# Patient Record
Sex: MALE | Race: BLACK OR AFRICAN AMERICAN | NOT HISPANIC OR LATINO | Employment: PART TIME | URBAN - METROPOLITAN AREA
[De-identification: names, ages, dates, MRNs, and addresses within clinical notes are randomized per-mention and may not be internally consistent; named-entity substitution may affect disease eponyms.]

---

## 2017-03-23 ENCOUNTER — HOSPITAL ENCOUNTER (EMERGENCY)
Facility: HOSPITAL | Age: 57
Discharge: HOME | End: 2017-03-23
Attending: EMERGENCY MEDICINE
Payer: MEDICAID

## 2017-03-23 VITALS
HEIGHT: 74 IN | WEIGHT: 157 LBS | DIASTOLIC BLOOD PRESSURE: 82 MMHG | SYSTOLIC BLOOD PRESSURE: 140 MMHG | HEART RATE: 78 BPM | BODY MASS INDEX: 20.15 KG/M2 | OXYGEN SATURATION: 98 % | TEMPERATURE: 99 F | RESPIRATION RATE: 19 BRPM

## 2017-03-23 DIAGNOSIS — R58 ARTERIAL HEMORRHAGE: Primary | ICD-10-CM
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
## 2017-03-23 DIAGNOSIS — F10.920 ALCOHOL INTOXICATION, UNCOMPLICATED: ICD-10-CM

## 2017-03-23 DIAGNOSIS — S00.03XA SCALP HEMATOMA, INITIAL ENCOUNTER: ICD-10-CM

## 2017-03-23 DIAGNOSIS — S01.81XA FOREHEAD LACERATION, INITIAL ENCOUNTER: ICD-10-CM

## 2017-03-23 LAB
ABO + RH BLD: NORMAL
ANION GAP SERPL CALC-SCNC: 14 MMOL/L
BASOPHILS # BLD AUTO: 0.01 K/UL
BASOPHILS NFR BLD: 0.2 %
BLD GP AB SCN CELLS X3 SERPL QL: NORMAL
BUN SERPL-MCNC: 14 MG/DL
CALCIUM SERPL-MCNC: 9.2 MG/DL
CHLORIDE SERPL-SCNC: 105 MMOL/L
CO2 SERPL-SCNC: 19 MMOL/L
CREAT SERPL-MCNC: 1 MG/DL
DIFFERENTIAL METHOD: ABNORMAL
EOSINOPHIL # BLD AUTO: 0.2 K/UL
EOSINOPHIL NFR BLD: 4.5 %
ERYTHROCYTE [DISTWIDTH] IN BLOOD BY AUTOMATED COUNT: 12.2 %
EST. GFR  (AFRICAN AMERICAN): >60 ML/MIN/1.73 M^2
EST. GFR  (NON AFRICAN AMERICAN): >60 ML/MIN/1.73 M^2
ETHANOL SERPL-MCNC: 243 MG/DL
GLUCOSE SERPL-MCNC: 137 MG/DL
HCT VFR BLD AUTO: 41.1 %
HGB BLD-MCNC: 14.4 G/DL
INR PPP: 1
LYMPHOCYTES # BLD AUTO: 1.4 K/UL
LYMPHOCYTES NFR BLD: 26.3 %
MCH RBC QN AUTO: 31.6 PG
MCHC RBC AUTO-ENTMCNC: 35 %
MCV RBC AUTO: 90 FL
MONOCYTES # BLD AUTO: 0.3 K/UL
MONOCYTES NFR BLD: 4.9 %
NEUTROPHILS # BLD AUTO: 3.3 K/UL
NEUTROPHILS NFR BLD: 63.5 %
PLATELET # BLD AUTO: 233 K/UL
PMV BLD AUTO: 10.5 FL
POTASSIUM SERPL-SCNC: 3.8 MMOL/L
PROTHROMBIN TIME: 10.9 SEC
RBC # BLD AUTO: 4.56 M/UL
SODIUM SERPL-SCNC: 138 MMOL/L
WBC # BLD AUTO: 5.13 K/UL

## 2017-03-23 PROCEDURE — 85025 COMPLETE CBC W/AUTO DIFF WBC: CPT

## 2017-03-23 PROCEDURE — 80048 BASIC METABOLIC PNL TOTAL CA: CPT

## 2017-03-23 PROCEDURE — 85610 PROTHROMBIN TIME: CPT

## 2017-03-23 PROCEDURE — 12011 RPR F/E/E/N/L/M 2.5 CM/<: CPT

## 2017-03-23 PROCEDURE — 99284 EMERGENCY DEPT VISIT MOD MDM: CPT | Mod: 25

## 2017-03-23 PROCEDURE — 86900 BLOOD TYPING SEROLOGIC ABO: CPT

## 2017-03-23 PROCEDURE — 25000003 PHARM REV CODE 250: Performed by: EMERGENCY MEDICINE

## 2017-03-23 PROCEDURE — 86901 BLOOD TYPING SEROLOGIC RH(D): CPT

## 2017-03-23 PROCEDURE — 80320 DRUG SCREEN QUANTALCOHOLS: CPT

## 2017-03-23 RX ADMIN — SODIUM CHLORIDE 1000 ML: 0.9 INJECTION, SOLUTION INTRAVENOUS at 01:03

## 2017-03-23 NOTE — ED PROVIDER NOTES
Encounter Date: 3/23/2017       History     Chief Complaint   Patient presents with    Head Laceration     pt. stumbled and fell hitting his head against corner of a brick wall. no loc. he walks into ed holding pressure to head with towel. the pt. is covered in blood and blood is spewing  from his head.      Review of patient's allergies indicates:  No Known Allergies  Patient is a 56 y.o. male presenting with the following complaint: facial injury. The history is provided by the patient.   Facial Injury    The current episode started just prior to arrival. The incident occurred at home. The injury mechanism was a fall. No protective equipment was used. He came to the ER via walk-in. There is an injury to the head.     Past Medical History:   Diagnosis Date    Hypertension      Past Surgical History:   Procedure Laterality Date    HAND SURGERY      MANDIBLE FRACTURE SURGERY       No family history on file.  Social History   Substance Use Topics    Smoking status: Current Every Day Smoker     Packs/day: 1.00     Types: Cigarettes    Smokeless tobacco: None    Alcohol use 3.6 oz/week     6 Cans of beer per week     Review of Systems    Physical Exam   Initial Vitals   BP Pulse Resp Temp SpO2   03/23/17 0101 03/23/17 0101 03/23/17 0101 03/23/17 0101 03/23/17 0101   149/89 81 19 98.9 °F (37.2 °C) 99 %     Physical Exam    ED Course   Critical Care  Date/Time: 3/23/2017 1:02 AM  Performed by: SEA OSULLIVAN  Authorized by: SEA OSULLIVAN   Direct patient critical care time: 15 minutes  Additional history critical care time: 8 minutes  Ordering / reviewing critical care time: 5 minutes  Documentation critical care time: 5 minutes  Consulting other physicians critical care time: 5 minutes  Total critical care time (exclusive of procedural time) : 38 minutes  Critical care was necessary to treat or prevent imminent or life-threatening deterioration of the following conditions: trauma.  Critical care was time  spent personally by me on the following activities: re-evaluation of patient's condition, examination of patient, ordering and review of laboratory studies, obtaining history from patient or surrogate, evaluation of patient's response to treatment and review of old charts.    Lac Repair  Date/Time: 3/23/2017 1:03 AM  Performed by: SEA OSULLIVAN  Authorized by: SEA OSULLIVAN   Consent Done: Emergent Situation  Body area: head/neck  Location details: forehead  Laceration length: 1 cm  Tendon involvement: none  Nerve involvement: none  Vascular damage: yes  Skin closure: 5-0 Prolene  Number of sutures: 3  Technique: vertical mattress  Approximation: close  Approximation difficulty: complex  Dressing: pressure dressing        Labs Reviewed   CBC W/ AUTO DIFFERENTIAL - Abnormal; Notable for the following:        Result Value    RBC 4.56 (*)     MCH 31.6 (*)     All other components within normal limits   BASIC METABOLIC PANEL - Abnormal; Notable for the following:     CO2 19 (*)     Glucose 137 (*)     All other components within normal limits   ALCOHOL,MEDICAL (ETHANOL) - Abnormal; Notable for the following:     Alcohol, Medical, Serum 243 (*)     All other components within normal limits   PROTIME-INR   TYPE & SCREEN             Medical Decision Making:   Initial Assessment:   56-year-old male who is a walk-in with active pulsating blood coming from his forehead after a fall  Differential Diagnosis:   He has an obvious arterial injury to his superficial scalp.  With compression an overlay hemostatic suturing bleeding stopped.  ED Management:  Hemostatic suturing stopped bleeding.  Patient was given IV fluid bolus in the emergency department.  His vital signs remained stable.  He requested to leave, after his blood work and everything came back normal. HCT without skull fracture or bleed. Patient was given recommendations to return to the emergency department for suture removal in 5 days.                   ED  Course     Clinical Impression:   The primary encounter diagnosis was Arterial hemorrhage. Diagnoses of Forehead laceration, initial encounter, Fall, initial encounter, Scalp hematoma, initial encounter, and Alcohol intoxication, uncomplicated were also pertinent to this visit.          Katya Foote MD  03/23/17 0227       Katya Foote MD  03/23/17 0239

## 2017-03-23 NOTE — ED NOTES
Pt arrived POV and walked in ED covered in blood with . Arterial bleed noted to left forehead. Pressure applied immediately. Pt roomed. MD at BS. Multiple figure eight sutures applied per . Pressure dressing applied using sterile 4x4s and ace bandage. Pt undressed, cleaned, and given blue scrubs. VS obtained and 2 PIVs initiated.

## 2017-03-23 NOTE — ED AVS SNAPSHOT
[facility]  [address] [facility] [address]               Anirudh ROWLEY Frankie   3/23/2017 12:46 AM   ED    Description:  Male : 1960   Department:  [facility]           Your Care was Coordinated By:     Provider Role From To    Katya Foote MD Attending Provider 17 0056 --      Reason for Visit     Head Laceration           Diagnoses this Visit        Comments    Arterial hemorrhage    -  Primary     Forehead laceration, initial encounter         Fall, initial encounter         Scalp hematoma, initial encounter         Alcohol intoxication, uncomplicated           ED Disposition     ED Disposition Condition Comment    Discharge             To Do List           Follow-up Information     Follow up with [facility]. Schedule an appointment as soon as possible for a visit in 2 days.    Specialty:  Family Medicine    Contact information:    [address], [address]  [facility] [address] [phone]      [facility]     [facility] Care [facility] -  Assistance  Registered nurses in the [facility] provide clinical advisement, health education, appointment booking, and other advisory services.  Call for this free service at [phone].             Medications           Message regarding Medications     Verify the changes and/or additions to your medication regime listed below are the same as discussed with your clinician today.  If any of these changes or additions are incorrect, please notify your healthcare provider.        These medications were administered today        Dose Freq    sodium chloride 0.9% bolus 1,000 mL 1,000 mL ED 1 Time    Sig: Inject 1,000 mLs into the vein ED 1 Time.    Class: Normal    Route: Intravenous           Verify that the below list of medications is an accurate representation of the medications you are currently taking.  If none reported, the list may  "be blank. If incorrect, please contact your healthcare provider. Carry this list with you in case of emergency.           Current Medications     LISINOPRIL ORAL Take by mouth.    triamcinolone acetonide 0.1% (KENALOG) 0.1 % Lotn Apply topically 2 (two) times daily.           Clinical Reference Information           Your Vitals Were     BP Pulse Temp Resp Height Weight    135/75 78 98.9 °F (37.2 °C) (Oral) 19 6' 2" (1.88 m) 71.2 kg (157 lb)    SpO2 BMI             98% 20.16 kg/m2         Allergies as of 3/23/2017     No Known Allergies      Immunizations Administered on Date of Encounter - 3/23/2017     None      ED Micro, Lab, POCT     Start Ordered       Status Ordering Provider    03/23/17 0107 03/23/17 0106  Ethanol  Once      Final result     03/23/17 0102 03/23/17 0101  Protime-INR  STAT      Final result     03/23/17 0101 03/23/17 0100  CBC auto differential  STAT      Final result     03/23/17 0101 03/23/17 0100  Basic metabolic panel  STAT      Final result       ED Imaging Orders     Start Ordered       Status Ordering Provider    03/23/17 0101 03/23/17 0101  CT Head Without Contrast  1 time imaging      Final result         Discharge Instructions         You had a fall today and sustained a laceration to your left forehead.  You injured one of the arteries of your forehead.  You had to have sutures placed to stop the bleeding.  Please have the sutures removed in 5 days.  Please return to the emergency department for this.  Please avoid drinking excessively to try and avoid any further falls.    First Aid: Head Injuries  A strong blow to the head may cause swelling and bleeding inside the skull. The resulting pressure can injure the brain (concussion). If you have any doubts identifying a concussion, have a healthcare provider check the victim.  Seek medical help if any of the following is true:  · The victim loses consciousness.  · The victim has convulsions or seizures.  · The victim has unequal pupil " size (the black part in the center of th eye is bigger one one side than the other)  · The victim shows any of the following signs of concussion:  ¨ confusion or inability to follow normal conversation  ¨ slurred speech  ¨ dizziness or vision problems  ¨ nausea or vomiting  ¨ muscle weakness or loss of mobility  ¨ memory loss  ¨ sensitivity to noise  ¨ fatigue  Call 911 immediately if the victim has any of the following:  · Prolonged loss of consciousness  · A depressed or spongy area in the skull, or visible bone fragments  · Clear fluid draining out of  the ears or nose  While you wait for help:  1. Reassure the person.  2. Treat for shock by maintaining body temperature and keeping the victim calm.  3. Provide rescue breathing or CPR, if needed.  4. If the person has neck or back pain or is unconscious, he or she might have a spine fracture. They should only  be moved with great precaution and if it is absolutely necessary.   Step 1: Control bleeding  · Apply direct pressure to control bleeding. (Wear gloves or use other protection to avoid contact with victim's blood.)  · Wash a minor surface injury with soap and water after the bleeding stops or is reduced.  · Cover the wound with a clean dressing and bandage.  Step 2: Ice bumps and bruises  · Place a cold pack or ice on the injury to reduce swelling and pain. Placing a cloth between the injury and the ice pack helps prevent tissue damage from severe cold.  Step 3: Observe the victim  · Watch for vomiting or changes in mood or alertness. If you notice changes, call for medical help. Signs of concussion may not appear for up to 48 hours.  · Tell the person's partner, parent, or roommate about the injury so he or she can continue to observe the victim.  Stitches  If a cut is deep or continues to bleed, or the edges of skin do not stay together evenly, the wound may need to be closed with stitches, tape, staples, or medical glue. All can help speed healing and  reduce the risk of infection and the size of the scar. These may be especially important concerns with large wounds, and wounds on the head or other visible body parts.  If you think a wound may need medical care, visit a health care professional as soon as possible. If stitches are needed, they must be applied in the first few hours. A wound that is not properly closed is at risk of serious infection.  Date Last Reviewed: 10/19/2015  © 1557-3516 [employer]. [address] [place] [address]. All rights reserved. This information is not intended as a substitute for professional medical care. Always follow your healthcare professional's instructions.          Discharge References/Attachments     LACERATION, FACE: SUTURE OR TAPE (ENGLISH)      [facility] Sign-Up     Activating your [facility] account is as easy as 1-2-3!     1) Visit [email or web address], select Sign Up Now, enter this activation code and your date of birth, then select Next.  [email or web address]  Expires: 5/7/2017  2:39 AM      2) Create a username and password to use when you visit [facility] in the future and select a security question in case you lose your password and select Next.    3) Enter your e-mail address and click Sign Up!    Additional Information  If you have questions, please e-mail [email or web address] or call [phone] to talk to our [facility] staff. Remember, [facility] is NOT to be used for urgent needs. For medical emergencies, dial 911.          [facility] complies with applicable Federal civil rights laws and does not discriminate on the basis of race, color, national origin, age, disability, or sex.        Language Assistance Services     ATTENTION: Language assistance services are available, free of charge. Please call [phone].      ATENCIÓN: Si habla benita, tiene a oneal disposición servicios gratuitos de asistencia lingüística. Llame al [phone].     CHÚ Ý: N?u b?n nói Ti?ng  Vi?t, có các d?ch v? h? tr? ngôn ng? mi?n phí dành cho b?n. G?i s? [phone].

## 2017-03-23 NOTE — DISCHARGE INSTRUCTIONS
You had a fall today and sustained a laceration to your left forehead.  You injured one of the arteries of your forehead.  You had to have sutures placed to stop the bleeding.  Please have the sutures removed in 5 days.  Please return to the emergency department for this.  Please avoid drinking excessively to try and avoid any further falls.    First Aid: Head Injuries  A strong blow to the head may cause swelling and bleeding inside the skull. The resulting pressure can injure the brain (concussion). If you have any doubts identifying a concussion, have a healthcare provider check the victim.  Seek medical help if any of the following is true:  · The victim loses consciousness.  · The victim has convulsions or seizures.  · The victim has unequal pupil size (the black part in the center of th eye is bigger one one side than the other)  · The victim shows any of the following signs of concussion:  ¨ confusion or inability to follow normal conversation  ¨ slurred speech  ¨ dizziness or vision problems  ¨ nausea or vomiting  ¨ muscle weakness or loss of mobility  ¨ memory loss  ¨ sensitivity to noise  ¨ fatigue  Call 911 immediately if the victim has any of the following:  · Prolonged loss of consciousness  · A depressed or spongy area in the skull, or visible bone fragments  · Clear fluid draining out of  the ears or nose  While you wait for help:  1. Reassure the person.  2. Treat for shock by maintaining body temperature and keeping the victim calm.  3. Provide rescue breathing or CPR, if needed.  4. If the person has neck or back pain or is unconscious, he or she might have a spine fracture. They should only  be moved with great precaution and if it is absolutely necessary.   Step 1: Control bleeding  · Apply direct pressure to control bleeding. (Wear gloves or use other protection to avoid contact with victim's blood.)  · Wash a minor surface injury with soap and water after the bleeding stops or is  reduced.  · Cover the wound with a clean dressing and bandage.  Step 2: Ice bumps and bruises  · Place a cold pack or ice on the injury to reduce swelling and pain. Placing a cloth between the injury and the ice pack helps prevent tissue damage from severe cold.  Step 3: Observe the victim  · Watch for vomiting or changes in mood or alertness. If you notice changes, call for medical help. Signs of concussion may not appear for up to 48 hours.  · Tell the person's partner, parent, or roommate about the injury so he or she can continue to observe the victim.  Stitches  If a cut is deep or continues to bleed, or the edges of skin do not stay together evenly, the wound may need to be closed with stitches, tape, staples, or medical glue. All can help speed healing and reduce the risk of infection and the size of the scar. These may be especially important concerns with large wounds, and wounds on the head or other visible body parts.  If you think a wound may need medical care, visit a health care professional as soon as possible. If stitches are needed, they must be applied in the first few hours. A wound that is not properly closed is at risk of serious infection.  Date Last Reviewed: 10/19/2015  © 1194-6763 [employer]. [address] [place] [address], [place] [address]. All rights reserved. This information is not intended as a substitute for professional medical care. Always follow your healthcare professional's instructions.

## 2017-04-11 ENCOUNTER — HOSPITAL ENCOUNTER (EMERGENCY)
Facility: HOSPITAL | Age: 57
Discharge: HOME | End: 2017-04-11
Attending: EMERGENCY MEDICINE
Payer: MEDICAID

## 2017-04-11 VITALS
WEIGHT: 170 LBS | HEIGHT: 74 IN | DIASTOLIC BLOOD PRESSURE: 87 MMHG | TEMPERATURE: 98 F | RESPIRATION RATE: 20 BRPM | HEART RATE: 79 BPM | BODY MASS INDEX: 21.82 KG/M2 | OXYGEN SATURATION: 97 % | SYSTOLIC BLOOD PRESSURE: 166 MMHG

## 2017-04-11 DIAGNOSIS — L24.0 CONTACT DERMATITIS DUE TO DETERGENT, UNSPECIFIED CONTACT DERMATITIS TYPE: Primary | ICD-10-CM

## 2017-04-11 PROCEDURE — 96372 THER/PROPH/DIAG INJ SC/IM: CPT

## 2017-04-11 PROCEDURE — 99283 EMERGENCY DEPT VISIT LOW MDM: CPT | Mod: 25

## 2017-04-11 PROCEDURE — 63600175 PHARM REV CODE 636 W HCPCS: Performed by: NURSE PRACTITIONER

## 2017-04-11 RX ORDER — TRIAMCINOLONE ACETONIDE 1 MG/G
OINTMENT TOPICAL 2 TIMES DAILY
Qty: 15 G | Refills: 0 | Status: SHIPPED | OUTPATIENT
Start: 2017-04-11 | End: 2017-05-02

## 2017-04-11 RX ORDER — PREDNISONE 20 MG/1
40 TABLET ORAL DAILY
Qty: 6 TABLET | Refills: 0 | Status: SHIPPED | OUTPATIENT
Start: 2017-04-11 | End: 2017-04-14

## 2017-04-11 RX ORDER — METHYLPREDNISOLONE SOD SUCC 125 MG
125 VIAL (EA) INJECTION
Status: COMPLETED | OUTPATIENT
Start: 2017-04-11 | End: 2017-04-11

## 2017-04-11 RX ADMIN — METHYLPREDNISOLONE SODIUM SUCCINATE 125 MG: 125 INJECTION, POWDER, FOR SOLUTION INTRAMUSCULAR; INTRAVENOUS at 11:04

## 2017-04-11 NOTE — ED PROVIDER NOTES
"Encounter Date: 4/11/2017       History     Chief Complaint   Patient presents with    Rash     Pt report having an itchy rash on his hands for about 3 months. States that he has had this problem for years.      Review of patient's allergies indicates:  No Known Allergies  HPI Comments: 57yo male with PMHx of HTN is here for a rash to his forearms and hands which he has had for the past three months.  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  Pt states, "I know what this is.  It is from the chemicals.  They got new stuff.  I get this all the time.  I just need a steroid shot, some pills, and ointment.  I like the ointment better because I can rub it in better."  PT denies fever/chills, pain, vomiting.      Patient is a 56 y.o. male presenting with the following complaint: rash. The history is provided by the patient.   Rash    The current episode started several weeks ago. The problem has been gradually worsening. The problem is associated with chemical exposure and a new detergent/soap. The rash is present on the right hand, left hand, right arm and left arm. The pain is at a severity of 0/10. The pain has been worsening since onset. Associated symptoms include itching. Pertinent negatives include no blisters, no pain and no weeping. He has tried nothing for the symptoms. The treatment provided no relief. Risk factors include new environmental exposures.     Past Medical History:   Diagnosis Date    Hypertension      Past Surgical History:   Procedure Laterality Date    HAND SURGERY      MANDIBLE FRACTURE SURGERY       History reviewed. No pertinent family history.  Social History   Substance Use Topics    Smoking status: Current Every Day Smoker     Packs/day: 1.00     Types: Cigarettes    Smokeless tobacco: None    Alcohol use 3.6 oz/week     6 Cans of beer per week     Review of Systems   Constitutional: Negative for chills and " fever.   HENT: Negative for facial swelling and trouble swallowing.    Respiratory: Negative for cough, chest tightness and shortness of breath.    Cardiovascular: Negative for chest pain.   Gastrointestinal: Negative for nausea and vomiting.   Musculoskeletal: Negative for back pain, joint swelling, myalgias and neck pain.   Skin: Positive for itching and rash.   Allergic/Immunologic: Negative for immunocompromised state.   Neurological: Negative for weakness.   Psychiatric/Behavioral: Negative for confusion.   All other systems reviewed and are negative.      Physical Exam   Initial Vitals   BP Pulse Resp Temp SpO2   04/11/17 1040 04/11/17 1040 04/11/17 1040 04/11/17 1040 04/11/17 1040   161/84 87 18 97.9 °F (36.6 °C) 97 %     Physical Exam    Nursing note and vitals reviewed.  Constitutional: Vital signs are normal. He appears well-developed and well-nourished. He is active and cooperative. He is easily aroused.  Non-toxic appearance. He does not have a sickly appearance. He does not appear ill. No distress.   HENT:   Head: Normocephalic and atraumatic.   Mouth/Throat: Uvula is midline and oropharynx is clear and moist.   Eyes: Conjunctivae are normal.   Neck: Normal range of motion.   Cardiovascular: Normal rate.   Pulmonary/Chest: Effort normal.   Abdominal: Normal appearance. There is no tenderness.   Neurological: He is alert, oriented to person, place, and time and easily aroused. GCS eye subscore is 4. GCS verbal subscore is 5. GCS motor subscore is 6.   Skin: Skin is warm, dry and intact. Rash noted. No lesion, no petechiae and no purpura noted. Rash is papular. Rash is not macular, not maculopapular, not nodular, not pustular, not vesicular and not urticarial.   Rash to bilateral forearms and hands.  No TTP.  No erythema, warmth, or drainage.  Consistent with contact dermatitis.  No breakdown of skin.    Psychiatric: He has a normal mood and affect. His speech is normal and behavior is normal. Judgment  and thought content normal. Cognition and memory are normal.         ED Course   Procedures  Labs Reviewed - No data to display          Medical Decision Making:   Initial Assessment:   55yo male here for recurrent rash to hands and forearms the pt attributes to gloves and chemicals at work.  Pt appears well, nontoxic.  Vitals stable.  No airway involvement.  Rash to bilateral hands and forearms consistent with contact dermatitis.   Differential Diagnosis:   Contact dermatitis, cellulitis, allergic reaction  ED Management:  IM Solumedrol  Rash due to contact derm.  Advised protection at work from chemicals and requesting new gloves at work without latex.  RX Prednisone and kenalog ointment.  Advised return to the ED if condition changes, progresses, or if there are concerns.  F/u with PCP and dermatology within 1 week.  Pt verbalized understanding and compliance.               Attending Attestation:     Physician Attestation Statement for NP/PA:   I discussed this assessment and plan of this patient with the NP/PA, but I did not personally examine the patient. The face to face encounter was performed by the NP/PA.                  ED Course     Clinical Impression:   The encounter diagnosis was Contact dermatitis due to detergent, unspecified contact dermatitis type.          AMY Bedoya  04/11/17 1743       Sin Plascencia MD  04/11/17 174

## 2017-04-11 NOTE — ED AVS SNAPSHOT
[facility]  [address] [facility] [address]               [place]   2017 10:50 AM   ED    Description:  Male : 1960   Department:  [facility]           Your Care was Coordinated By:     Provider Role From To    Sin Plascencia MD Attending Provider 17 1100 --    AMY Bedoay Nurse Practitioner 17 1053 --      Reason for Visit     Rash           Diagnoses this Visit        Comments    Contact dermatitis and eczema due to detergents    -  Primary       ED Disposition     None           To Do List           Follow-up Information     Follow up with [facility]. Schedule an appointment as soon as possible for a visit in 3 days.    Specialty:  Family Medicine    Contact information:    [address], [address]  [facility] [address] [phone]        Follow up with Dermatology. Schedule an appointment as soon as possible for a visit in 1 week.       These Medications        Disp Refills Start End    triamcinolone acetonide 0.1% (KENALOG) 0.1 % ointment 15 g 0 2017    Apply topically 2 (two) times daily. Until resolved - Topical (Top)    predniSONE (DELTASONE) 20 MG tablet 6 tablet 0 2017    Take 2 tablets (40 mg total) by mouth once daily. Start 17 - Oral      [facility]     [facility] Care [facility] -  Assistance  Unless otherwise directed by your provider, please contact [facility], our nurse care line that is available for  assistance.     Registered nurses in the [facility] provide: appointment scheduling, clinical advisement, health education, and other advisory services.  Call: [phone] (toll free)               Medications           Message regarding Medications     Verify the changes and/or additions to your medication regime listed below are the same as discussed with your  "clinician today.  If any of these changes or additions are incorrect, please notify your healthcare provider.        START taking these NEW medications        Refills    triamcinolone acetonide 0.1% (KENALOG) 0.1 % ointment 0    Sig: Apply topically 2 (two) times daily. Until resolved    Class: Print    Route: Topical (Top)    predniSONE (DELTASONE) 20 MG tablet 0    Sig: Take 2 tablets (40 mg total) by mouth once daily. Start 4/12/17    Class: Print    Route: Oral      These medications were administered today        Dose Freq    methylPREDNISolone sodium succinate injection 125 mg 125 mg ED 1 Time    Sig: Inject 125 mg into the muscle ED 1 Time.    Class: Normal    Route: Intramuscular      STOP taking these medications     triamcinolone acetonide 0.1% (KENALOG) 0.1 % Lotn Apply topically 2 (two) times daily.           Verify that the below list of medications is an accurate representation of the medications you are currently taking.  If none reported, the list may be blank. If incorrect, please contact your healthcare provider. Carry this list with you in case of emergency.           Current Medications     LISINOPRIL ORAL Take by mouth.    methylPREDNISolone sodium succinate injection 125 mg Inject 125 mg into the muscle ED 1 Time.    predniSONE (DELTASONE) 20 MG tablet Take 2 tablets (40 mg total) by mouth once daily. Start 4/12/17    triamcinolone acetonide 0.1% (KENALOG) 0.1 % ointment Apply topically 2 (two) times daily. Until resolved           Clinical Reference Information           Your Vitals Were     BP Pulse Temp Resp Height Weight    161/84 87 97.9 °F (36.6 °C) 18 6' 2" (1.88 m) 77.1 kg (170 lb)    SpO2 BMI             97% 21.83 kg/m2         Allergies as of 4/11/2017     No Known Allergies      Immunizations Administered on Date of Encounter - 4/11/2017     None      ED Micro, Lab, POCT     None      ED Imaging Orders     None        Discharge Instructions         Contact Dermatitis  Contact " "dermatitis is a skin rash caused by something that touches the skin and makes it irritated and inflamed. Your skin may be red, swollen, dry, and may be cracked. Blisters may form and ooze. The rash will itch.  Contact dermatitis can form on the face and neck, backs of hands, forearms, genitals, and lower legs.  People can get contact dermatitis from lots of sources. These include:  · Plants such as poison ivy, oak, or sumac  · Chemicals in hair dyes and rinses, soaps, solvents, waxes, fingernail polish, and deodorants   · Jewelry or watchbands made of nickel  Contact dermatitis is not passed from person to person.  Talk with your healthcare provider about what may have caused the rash. A type of allergy testing called "patch testing" may be used to discover what you are allergic to. You will need to avoid the source of your rash in the future to prevent it from coming back.  Treatment is done to relieve itching and prevent the rash from coming back. The rash should go away in a few days to a few weeks.  Home care  Your healthcare provider may prescribe medicine to relieve swelling and itching. Follow all instructions when using these medicines.  General care:  · Avoid anything that heats up your skin, such as hot showers or baths, or direct sunlight. This can make itching worse.  · Apply cold compresses to soothe your sores to help relieve your symptoms. Do this for 30 minutes 3 to 4 times a day. You can make a cold compress by soaking a cloth in cold water. Squeeze out excess water. You can add colloidal oatmeal to the water to help reduce itching. For severe itching in a small area, apply an ice pack wrapped in a thin towel. Do this for 20 minutes 3 to 4 times a day.  · You can also try wet dressings. One way to do this is to wear a wet piece of clothing under a dry one. Wear a damp shirt under a dry shirt if your upper body is affected. This can relieve itching and prevent you from scratching the affected " area.  · You can also help relieve large areas of itching by taking a lukewarm bath with colloidal oatmeal added to the water.  · Use hydrocortisone cream for redness and irritation, unless another medicine was prescribed. You can also use benzocaine anesthetic cream or spray. Calamine lotion can also relieve mild symptoms.  · Use oral diphenhydramine to help reduce itching. You can buy this antihistamine at drug and grocery stores. It can make you sleepy, so use lower doses during the daytime. Or you can use loratadine. This is an antihistamine that will not make you sleepy. Do not use diphenhydramine if you have glaucoma or have trouble urinating due to an enlarged prostate.  · If a plant causes your rash, make sure to wash your skin and the clothes you were wearing when you came into contact with the plant. This is to wash away the plant oils that gave you the rash and prevent more or worse symptoms.  · Stay away from the substance or object that causes your symptoms. If you cant avoid it, wear gloves or some other type of protection.  Follow-up care  Follow up with your healthcare provider, or as advised.  When to seek medical advice  Call your healthcare provider right away if any of these occur:  · Spreading of the rash to other parts of your body  · Severe swelling of your face, eyelids, mouth, throat or tongue  · Trouble urinating due to swelling in the genital area  · Fever of 100.4°F (38°C) or higher  · Redness or swelling that gets worse  · Pain that gets worse  · Foul-smelling fluid leaking from the skin  · Yellow-brown crusts on the open blisters  Date Last Reviewed: 9/1/2016  © 2249-5475 [employer]. [address] [place] [address], [place] [address]. All rights reserved. This information is not intended as a substitute for professional medical care. Always follow your healthcare professional's instructions.          Self-Care for Skin Rashes     Pat your skin dry. Do not rub.     When your skin  reacts to a substance your body is sensitive to, it can cause a rash. You can treat most rashes at home by keeping the skin clean and dry. Many rashes may get better on their own within 2 to 3 days. You may need medical attention if your rash itches, drains, or hurts, particularly if the rash is getting worse.  What can cause a skin rash?  · Sun poisoning, caused by too much exposure to the sun  · An irritant or allergic reaction to a certain type of food, plant, or chemical, such as  shellfish, poison ivy, and or cleaning products  · An infection caused by a fungus (ringworm), virus (chickenpox), or bacteria (strep)  · Bites or infestation caused by insects or pests, such as ticks, lice, or mites  · Dry skin, which is often seen during the winter months and in older people  How can I control itching and skin damage?  · Take soothing lukewarm baths in a colloidal oatmeal product. You can buy this at the [employer].  · Do your best not to scratch. Clip fingernails short, especially in young children, to reduce skin damage if scratching does occur.  · Use moisturizing skin lotion instead of scratching your dry skin.  · Use sunscreen whenever going out into direct sun.  · Use only mild cleansing agents whenever possible.  · Wash with mild, nonirritating soap and warm water.  · Wear clothing that breathes, such as cotton shirts or canvas shoes.  · If fluid is seeping from the rash, cover it loosely with clean gauze to absorb the discharge.  · Many rashes are contagious. Prevent the rash from spreading to others by washing your hands often before or after touching others with any skin rash.  Use medicine  · Antihistamines such as diphenhydramine can help control itching. But use with caution because they can make you drowsy.  · Using over-the-counter hydrocortisone cream on small rashes may help reduce swelling and itching  · Most over-the-counter antifungal medicines can treat athletes foot and many other fungal  infections of the skin.  Check with your healthcare provider  Call your healthcare provider if:  · You were told that you have a fungal infection on your skin to make sure you have the correct type of medicine.  · You have questions or concerns about medicines or their side effects.     Call 911  Call 911 if either of these occur:  · Your tongue or lips start to swell  · You have difficulty breathing      Call your healthcare provider  Call your healthcare provider if any of these occur:  · Temperature of more than 101.0°F (38.3°C), or as directed  · Sore throat, a cough, or unusual fatigue  · Red, oozy, or painful rash gets worse. These are signs of infection.  · Rash covers your face, genitals, or most of your body  · Crusty sores or red rings that begin to spread  · You were exposed to someone who has a contagious rash, such as scabies or lice.  · Red bulls-eye rash with a white center (a sign of Lyme disease)  · You were told that you have resistant bacteria (MRSA) on your skin.   Date Last Reviewed: 5/12/2015  © 6735-3355 [employer]. [address] [place] [address]. All rights reserved. This information is not intended as a substitute for professional medical care. Always follow your healthcare professional's instructions.          [facility] Sign-Up     Activating your [facility] account is as easy as 1-2-3!     1) Visit [email or web address], select Sign Up Now, enter this activation code and your date of birth, then select Next.  [email or web address]  Expires: 5/7/2017  2:39 AM      2) Create a username and password to use when you visit [facility] in the future and select a security question in case you lose your password and select Next.    3) Enter your e-mail address and click Sign Up!    Additional Information  If you have questions, please e-mail [email or web address] or call [phone] to talk to our [facility] staff. Remember, [facility] is NOT to be used for urgent needs. For medical  emergencies, dial 911.         Smoking Cessation     If you would like to quit smoking:   You may be eligible for free services if you are a [place] resident and started smoking cigarettes before September 1, 1988.  Call the Smoking Cessation Trust (SCT) toll free at (438) 643-0350 or (588) 691-0425.   Call 1-800-QUIT-NOW if you do not meet the above criteria.   Contact us via email: [email or web address]   View our website for more information: [email or web address]         [facility] complies with applicable Federal civil rights laws and does not discriminate on the basis of race, color, national origin, age, disability, or sex.        Language Assistance Services     ATTENTION: Language assistance services are available, free of charge. Please call [phone].      ATENCIÓN: Si habla benita, tiene a oneal disposición servicios gratuitos de asistencia lingüística. Llame al [phone].     CHÚ Ý: N?u b?n nói Ti?ng Vi?t, có các d?ch v? h? tr? ngôn ng? mi?n phí dành cho b?n. G?i s? [phone].

## 2017-04-11 NOTE — ED NOTES
Pt states he has a rash to bilateral hands for months with swelling that began this morning.  Pt states that he has a history of eczema and this happens often.

## 2017-04-11 NOTE — DISCHARGE INSTRUCTIONS
"  Contact Dermatitis  Contact dermatitis is a skin rash caused by something that touches the skin and makes it irritated and inflamed. Your skin may be red, swollen, dry, and may be cracked. Blisters may form and ooze. The rash will itch.  Contact dermatitis can form on the face and neck, backs of hands, forearms, genitals, and lower legs.  People can get contact dermatitis from lots of sources. These include:  · Plants such as poison ivy, oak, or sumac  · Chemicals in hair dyes and rinses, soaps, solvents, waxes, fingernail polish, and deodorants   · Jewelry or watchbands made of nickel  Contact dermatitis is not passed from person to person.  Talk with your healthcare provider about what may have caused the rash. A type of allergy testing called "patch testing" may be used to discover what you are allergic to. You will need to avoid the source of your rash in the future to prevent it from coming back.  Treatment is done to relieve itching and prevent the rash from coming back. The rash should go away in a few days to a few weeks.  Home care  Your healthcare provider may prescribe medicine to relieve swelling and itching. Follow all instructions when using these medicines.  General care:  · Avoid anything that heats up your skin, such as hot showers or baths, or direct sunlight. This can make itching worse.  · Apply cold compresses to soothe your sores to help relieve your symptoms. Do this for 30 minutes 3 to 4 times a day. You can make a cold compress by soaking a cloth in cold water. Squeeze out excess water. You can add colloidal oatmeal to the water to help reduce itching. For severe itching in a small area, apply an ice pack wrapped in a thin towel. Do this for 20 minutes 3 to 4 times a day.  · You can also try wet dressings. One way to do this is to wear a wet piece of clothing under a dry one. Wear a damp shirt under a dry shirt if your upper body is affected. This can relieve itching and prevent you from " scratching the affected area.  · You can also help relieve large areas of itching by taking a lukewarm bath with colloidal oatmeal added to the water.  · Use hydrocortisone cream for redness and irritation, unless another medicine was prescribed. You can also use benzocaine anesthetic cream or spray. Calamine lotion can also relieve mild symptoms.  · Use oral diphenhydramine to help reduce itching. You can buy this antihistamine at drug and grocery stores. It can make you sleepy, so use lower doses during the daytime. Or you can use loratadine. This is an antihistamine that will not make you sleepy. Do not use diphenhydramine if you have glaucoma or have trouble urinating due to an enlarged prostate.  · If a plant causes your rash, make sure to wash your skin and the clothes you were wearing when you came into contact with the plant. This is to wash away the plant oils that gave you the rash and prevent more or worse symptoms.  · Stay away from the substance or object that causes your symptoms. If you cant avoid it, wear gloves or some other type of protection.  Follow-up care  Follow up with your healthcare provider, or as advised.  When to seek medical advice  Call your healthcare provider right away if any of these occur:  · Spreading of the rash to other parts of your body  · Severe swelling of your face, eyelids, mouth, throat or tongue  · Trouble urinating due to swelling in the genital area  · Fever of 100.4°F (38°C) or higher  · Redness or swelling that gets worse  · Pain that gets worse  · Foul-smelling fluid leaking from the skin  · Yellow-brown crusts on the open blisters  Date Last Reviewed: 9/1/2016  © 6270-8454 [employer]. [address] [place] [address], [place] [address]. All rights reserved. This information is not intended as a substitute for professional medical care. Always follow your healthcare professional's instructions.          Self-Care for Skin Rashes     Pat your skin dry. Do not  rub.     When your skin reacts to a substance your body is sensitive to, it can cause a rash. You can treat most rashes at home by keeping the skin clean and dry. Many rashes may get better on their own within 2 to 3 days. You may need medical attention if your rash itches, drains, or hurts, particularly if the rash is getting worse.  What can cause a skin rash?  · Sun poisoning, caused by too much exposure to the sun  · An irritant or allergic reaction to a certain type of food, plant, or chemical, such as  shellfish, poison ivy, and or cleaning products  · An infection caused by a fungus (ringworm), virus (chickenpox), or bacteria (strep)  · Bites or infestation caused by insects or pests, such as ticks, lice, or mites  · Dry skin, which is often seen during the winter months and in older people  How can I control itching and skin damage?  · Take soothing lukewarm baths in a colloidal oatmeal product. You can buy this at the [employer].  · Do your best not to scratch. Clip fingernails short, especially in young children, to reduce skin damage if scratching does occur.  · Use moisturizing skin lotion instead of scratching your dry skin.  · Use sunscreen whenever going out into direct sun.  · Use only mild cleansing agents whenever possible.  · Wash with mild, nonirritating soap and warm water.  · Wear clothing that breathes, such as cotton shirts or canvas shoes.  · If fluid is seeping from the rash, cover it loosely with clean gauze to absorb the discharge.  · Many rashes are contagious. Prevent the rash from spreading to others by washing your hands often before or after touching others with any skin rash.  Use medicine  · Antihistamines such as diphenhydramine can help control itching. But use with caution because they can make you drowsy.  · Using over-the-counter hydrocortisone cream on small rashes may help reduce swelling and itching  · Most over-the-counter antifungal medicines can treat athletes foot and  many other fungal infections of the skin.  Check with your healthcare provider  Call your healthcare provider if:  · You were told that you have a fungal infection on your skin to make sure you have the correct type of medicine.  · You have questions or concerns about medicines or their side effects.     Call 911  Call 911 if either of these occur:  · Your tongue or lips start to swell  · You have difficulty breathing      Call your healthcare provider  Call your healthcare provider if any of these occur:  · Temperature of more than 101.0°F (38.3°C), or as directed  · Sore throat, a cough, or unusual fatigue  · Red, oozy, or painful rash gets worse. These are signs of infection.  · Rash covers your face, genitals, or most of your body  · Crusty sores or red rings that begin to spread  · You were exposed to someone who has a contagious rash, such as scabies or lice.  · Red bulls-eye rash with a white center (a sign of Lyme disease)  · You were told that you have resistant bacteria (MRSA) on your skin.   Date Last Reviewed: 5/12/2015  © 2573-5316 [employer]. [address] [place] [address]. All rights reserved. This information is not intended as a substitute for professional medical care. Always follow your healthcare professional's instructions.

## 2017-05-02 ENCOUNTER — HOSPITAL ENCOUNTER (EMERGENCY)
Facility: HOSPITAL | Age: 57
Discharge: HOME | End: 2017-05-02
Attending: EMERGENCY MEDICINE
Payer: MEDICAID

## 2017-05-02 VITALS
SYSTOLIC BLOOD PRESSURE: 142 MMHG | HEART RATE: 85 BPM | RESPIRATION RATE: 20 BRPM | WEIGHT: 160 LBS | DIASTOLIC BLOOD PRESSURE: 87 MMHG | BODY MASS INDEX: 20.53 KG/M2 | TEMPERATURE: 98 F | OXYGEN SATURATION: 98 % | HEIGHT: 74 IN

## 2017-05-02 DIAGNOSIS — L30.1 DYSHIDROTIC ECZEMA: Primary | ICD-10-CM

## 2017-05-02 PROCEDURE — 63600175 PHARM REV CODE 636 W HCPCS: Performed by: EMERGENCY MEDICINE

## 2017-05-02 PROCEDURE — 96372 THER/PROPH/DIAG INJ SC/IM: CPT

## 2017-05-02 PROCEDURE — 99283 EMERGENCY DEPT VISIT LOW MDM: CPT

## 2017-05-02 RX ORDER — DEXAMETHASONE SODIUM PHOSPHATE 4 MG/ML
12 INJECTION, SOLUTION INTRA-ARTICULAR; INTRALESIONAL; INTRAMUSCULAR; INTRAVENOUS; SOFT TISSUE
Status: COMPLETED | OUTPATIENT
Start: 2017-05-02 | End: 2017-05-02

## 2017-05-02 RX ORDER — PREDNISONE 10 MG/1
10 TABLET ORAL DAILY
Qty: 21 TABLET | Refills: 0 | Status: SHIPPED | OUTPATIENT
Start: 2017-05-02 | End: 2017-05-12

## 2017-05-02 RX ORDER — AMOXICILLIN AND CLAVULANATE POTASSIUM 875; 125 MG/1; MG/1
1 TABLET, FILM COATED ORAL 2 TIMES DAILY
Qty: 14 TABLET | Refills: 0 | Status: SHIPPED | OUTPATIENT
Start: 2017-05-02 | End: 2017-06-18

## 2017-05-02 RX ORDER — CLOBETASOL PROPIONATE 0.5 MG/G
OINTMENT TOPICAL 2 TIMES DAILY
Qty: 60 G | Refills: 0 | Status: ON HOLD | OUTPATIENT
Start: 2017-05-02 | End: 2020-03-12 | Stop reason: HOSPADM

## 2017-05-02 RX ORDER — KETOROLAC TROMETHAMINE 30 MG/ML
60 INJECTION, SOLUTION INTRAMUSCULAR; INTRAVENOUS
Status: COMPLETED | OUTPATIENT
Start: 2017-05-02 | End: 2017-05-02

## 2017-05-02 RX ORDER — DIPHENHYDRAMINE HYDROCHLORIDE 50 MG/ML
50 INJECTION INTRAMUSCULAR; INTRAVENOUS
Status: COMPLETED | OUTPATIENT
Start: 2017-05-02 | End: 2017-05-02

## 2017-05-02 RX ORDER — HYDROXYZINE HYDROCHLORIDE 25 MG/1
25 TABLET, FILM COATED ORAL 3 TIMES DAILY PRN
Qty: 30 TABLET | Refills: 0 | Status: SHIPPED | OUTPATIENT
Start: 2017-05-02 | End: 2017-07-23

## 2017-05-02 RX ADMIN — KETOROLAC TROMETHAMINE 60 MG: 30 INJECTION, SOLUTION INTRAMUSCULAR at 07:05

## 2017-05-02 RX ADMIN — DIPHENHYDRAMINE HYDROCHLORIDE 50 MG: 50 INJECTION, SOLUTION INTRAMUSCULAR; INTRAVENOUS at 07:05

## 2017-05-02 RX ADMIN — DEXAMETHASONE SODIUM PHOSPHATE 12 MG: 4 INJECTION, SOLUTION INTRAMUSCULAR; INTRAVENOUS at 07:05

## 2017-05-02 NOTE — ED AVS SNAPSHOT
[facility]  [address]  [place] [address]               Anirudh David   2017  6:53 PM   ED    Description:  Male : 1960   Department:  [facility]           Your Care was Coordinated By:     Provider Role From To    Nereida Iverson MD Attending Provider [id number] --      Reason for Visit     Rash           Diagnoses this Visit        Comments    Dyshidrotic eczema    -  Primary       ED Disposition     ED Disposition Condition Comment    Discharge             To Do List           Follow-up Information     Follow up with [facility] In 3 days.    Contact information:    [address]  [facility] [address]  [phone]          Follow up with Andrew Brock MD In 3 days.    Specialty:  Dermatology    Contact information:    [address]  [place] [address]  [phone]         These Medications        Disp Refills Start End    predniSONE (DELTASONE) 10 MG tablet 21 tablet 0 2017    Take 1 tablet (10 mg total) by mouth once daily. Take 4 tabs x 3 days, then  Take 2 tabs x 3 days, then   Take 1 tab x 3 days. - Oral    amoxicillin-clavulanate 875-125mg (AUGMENTIN) 875-125 mg per tablet 14 tablet 0 2017     Take 1 tablet by mouth 2 (two) times daily. - Oral    clobetasol 0.05% (TEMOVATE) 0.05 % Oint 60 g 0 2017    Apply topically 2 (two) times daily. - Topical    hydrOXYzine HCl (ATARAX) 25 MG tablet 30 tablet 0 2017     Take 1 tablet (25 mg total) by mouth 3 (three) times daily as needed for Itching. - Oral      [facility]     [facility] -  Assistance  Unless otherwise directed by your provider, please contact [facility], our nurse care line that is available for  assistance.     Registered nurses in the [facility] provide: appointment scheduling, clinical advisement, health education, and other advisory services.  Call:  [phone] (toll free)               Medications           Message regarding Medications     Verify the changes and/or additions to your medication regime listed below are the same as discussed with your clinician today.  If any of these changes or additions are incorrect, please notify your healthcare provider.        START taking these NEW medications        Refills    predniSONE (DELTASONE) 10 MG tablet 0    Sig: Take 1 tablet (10 mg total) by mouth once daily. Take 4 tabs x 3 days, then  Take 2 tabs x 3 days, then   Take 1 tab x 3 days.    Class: Print    Route: Oral    amoxicillin-clavulanate 875-125mg (AUGMENTIN) 875-125 mg per tablet 0    Sig: Take 1 tablet by mouth 2 (two) times daily.    Class: Print    Route: Oral    clobetasol 0.05% (TEMOVATE) 0.05 % Oint 0    Sig: Apply topically 2 (two) times daily.    Class: Print    Route: Topical    hydrOXYzine HCl (ATARAX) 25 MG tablet 0    Sig: Take 1 tablet (25 mg total) by mouth 3 (three) times daily as needed for Itching.    Class: Print    Route: Oral      These medications were administered today        Dose Freq    ketorolac injection 60 mg 60 mg ED 1 Time    Sig: Inject 60 mg into the muscle ED 1 Time.    Class: Normal    Route: Intramuscular    dexamethasone injection 12 mg 12 mg ED 1 Time    Sig: Inject 3 mLs (12 mg total) into the muscle ED 1 Time.    Class: Normal    Route: Intramuscular    diphenhydrAMINE injection 50 mg 50 mg ED 1 Time    Sig: Inject 1 mL (50 mg total) into the muscle ED 1 Time.    Class: Normal    Route: Intramuscular      STOP taking these medications     triamcinolone acetonide 0.1% (KENALOG) 0.1 % ointment Apply topically 2 (two) times daily. Until resolved           Verify that the below list of medications is an accurate representation of the medications you are currently taking.  If none reported, the list may be blank. If incorrect, please contact your healthcare provider. Carry this list with you in case of emergency.     "       Current Medications     amoxicillin-clavulanate 875-125mg (AUGMENTIN) 875-125 mg per tablet Take 1 tablet by mouth 2 (two) times daily.    clobetasol 0.05% (TEMOVATE) 0.05 % Oint Apply topically 2 (two) times daily.    hydrOXYzine HCl (ATARAX) 25 MG tablet Take 1 tablet (25 mg total) by mouth 3 (three) times daily as needed for Itching.    LISINOPRIL ORAL Take by mouth.    predniSONE (DELTASONE) 10 MG tablet Take 1 tablet (10 mg total) by mouth once daily. Take 4 tabs x 3 days, then  Take 2 tabs x 3 days, then   Take 1 tab x 3 days.           Clinical Reference Information           Your Vitals Were     BP Pulse Temp Resp Height Weight    189/98 (BP Location: Right arm, Patient Position: Sitting) 84 97.9 °F (36.6 °C) (Oral) 20 6' 2" (1.88 m) 72.6 kg (160 lb)    SpO2 BMI             98% 20.54 kg/m2         Allergies as of 5/2/2017     No Known Allergies      Immunizations Administered on Date of Encounter - 5/2/2017     None      ED Micro, Lab, POCT     None      ED Imaging Orders     None        Discharge Instructions         Self-Care for Skin Rashes     Pat your skin dry. Do not rub.     When your skin reacts to a substance your body is sensitive to, it can cause a rash. You can treat most rashes at home by keeping the skin clean and dry. Many rashes may get better on their own within 2 to 3 days. You may need medical attention if your rash itches, drains, or hurts, particularly if the rash is getting worse.  What can cause a skin rash?  · Sun poisoning, caused by too much exposure to the sun  · An irritant or allergic reaction to a certain type of food, plant, or chemical, such as  shellfish, poison ivy, and or cleaning products  · An infection caused by a fungus (ringworm), virus (chickenpox), or bacteria (strep)  · Bites or infestation caused by insects or pests, such as ticks, lice, or mites  · Dry skin, which is often seen during the winter months and in older people  How can I control itching and skin " damage?  · Take soothing lukewarm baths in a colloidal oatmeal product. You can buy this at the [employer].  · Do your best not to scratch. Clip fingernails short, especially in young children, to reduce skin damage if scratching does occur.  · Use moisturizing skin lotion instead of scratching your dry skin.  · Use sunscreen whenever going out into direct sun.  · Use only mild cleansing agents whenever possible.  · Wash with mild, nonirritating soap and warm water.  · Wear clothing that breathes, such as cotton shirts or canvas shoes.  · If fluid is seeping from the rash, cover it loosely with clean gauze to absorb the discharge.  · Many rashes are contagious. Prevent the rash from spreading to others by washing your hands often before or after touching others with any skin rash.  Use medicine  · Antihistamines such as diphenhydramine can help control itching. But use with caution because they can make you drowsy.  · Using over-the-counter hydrocortisone cream on small rashes may help reduce swelling and itching  · Most over-the-counter antifungal medicines can treat athletes foot and many other fungal infections of the skin.  Check with your healthcare provider  Call your healthcare provider if:  · You were told that you have a fungal infection on your skin to make sure you have the correct type of medicine.  · You have questions or concerns about medicines or their side effects.     Call 911  Call 911 if either of these occur:  · Your tongue or lips start to swell  · You have difficulty breathing      Call your healthcare provider  Call your healthcare provider if any of these occur:  · Temperature of more than 101.0°F (38.3°C), or as directed  · Sore throat, a cough, or unusual fatigue  · Red, oozy, or painful rash gets worse. These are signs of infection.  · Rash covers your face, genitals, or most of your body  · Crusty sores or red rings that begin to spread  · You were exposed to someone who has a contagious  rash, such as scabies or lice.  · Red bulls-eye rash with a white center (a sign of Lyme disease)  · You were told that you have resistant bacteria (MRSA) on your skin.   Date Last Reviewed: 5/12/2015  © 8758-8994 [employer]. [address] [place] [address]. All rights reserved. This information is not intended as a substitute for professional medical care. Always follow your healthcare professional's instructions.          Discharge References/Attachments     ATOPIC DERMATITIS (ECZEMA) (ENGLISH)      [facility] Sign-Up     Activating your [facility] account is as easy as 1-2-3!     1) Visit [email or web address], select Sign Up Now, enter this activation code and your date of birth, then select Next.  [email or web address]  Expires: 5/7/2017  2:39 AM      2) Create a username and password to use when you visit [facility] in the future and select a security question in case you lose your password and select Next.    3) Enter your e-mail address and click Sign Up!    Additional Information  If you have questions, please e-mail [email or web address] or call [phone] to talk to our [facility] staff. Remember, [facility] is NOT to be used for urgent needs. For medical emergencies, dial 911.          [facility] complies with applicable Federal civil rights laws and does not discriminate on the basis of race, color, national origin, age, disability, or sex.        Language Assistance Services     ATTENTION: Language assistance services are available, free of charge. Please call [phone].      ATENCIÓN: Si habla benita, tiene a oneal disposición servicios gratuitos de asistencia lingüística. Llame al [phone].     CHÚ Ý: N?u b?n nói Ti?ng Vi?t, có các d?ch v? h? tr? ngôn ng? mi?n phí dành cho b?n. G?i s? [phone].

## 2017-05-03 NOTE — DISCHARGE INSTRUCTIONS
Self-Care for Skin Rashes     Pat your skin dry. Do not rub.     When your skin reacts to a substance your body is sensitive to, it can cause a rash. You can treat most rashes at home by keeping the skin clean and dry. Many rashes may get better on their own within 2 to 3 days. You may need medical attention if your rash itches, drains, or hurts, particularly if the rash is getting worse.  What can cause a skin rash?  · Sun poisoning, caused by too much exposure to the sun  · An irritant or allergic reaction to a certain type of food, plant, or chemical, such as  shellfish, poison ivy, and or cleaning products  · An infection caused by a fungus (ringworm), virus (chickenpox), or bacteria (strep)  · Bites or infestation caused by insects or pests, such as ticks, lice, or mites  · Dry skin, which is often seen during the winter months and in older people  How can I control itching and skin damage?  · Take soothing lukewarm baths in a colloidal oatmeal product. You can buy this at the [employer].  · Do your best not to scratch. Clip fingernails short, especially in young children, to reduce skin damage if scratching does occur.  · Use moisturizing skin lotion instead of scratching your dry skin.  · Use sunscreen whenever going out into direct sun.  · Use only mild cleansing agents whenever possible.  · Wash with mild, nonirritating soap and warm water.  · Wear clothing that breathes, such as cotton shirts or canvas shoes.  · If fluid is seeping from the rash, cover it loosely with clean gauze to absorb the discharge.  · Many rashes are contagious. Prevent the rash from spreading to others by washing your hands often before or after touching others with any skin rash.  Use medicine  · Antihistamines such as diphenhydramine can help control itching. But use with caution because they can make you drowsy.  · Using over-the-counter hydrocortisone cream on small rashes may help reduce swelling and itching  · Most  over-the-counter antifungal medicines can treat athletes foot and many other fungal infections of the skin.  Check with your healthcare provider  Call your healthcare provider if:  · You were told that you have a fungal infection on your skin to make sure you have the correct type of medicine.  · You have questions or concerns about medicines or their side effects.     Call 911  Call 911 if either of these occur:  · Your tongue or lips start to swell  · You have difficulty breathing      Call your healthcare provider  Call your healthcare provider if any of these occur:  · Temperature of more than 101.0°F (38.3°C), or as directed  · Sore throat, a cough, or unusual fatigue  · Red, oozy, or painful rash gets worse. These are signs of infection.  · Rash covers your face, genitals, or most of your body  · Crusty sores or red rings that begin to spread  · You were exposed to someone who has a contagious rash, such as scabies or lice.  · Red bulls-eye rash with a white center (a sign of Lyme disease)  · You were told that you have resistant bacteria (MRSA) on your skin.   Date Last Reviewed: 5/12/2015  © 1625-2189 [employer]. [address] [place] [address], [place] [address]. All rights reserved. This information is not intended as a substitute for professional medical care. Always follow your healthcare professional's instructions.

## 2017-05-03 NOTE — ED NOTES
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  Pt reports itchy, red, raised rash to torso, upper extremities and bilateral thighs that began after working last pm.  Denies SOB, lip or mouth swelling, difficulty swallowing.  Speech clear.

## 2017-05-16 NOTE — ED PROVIDER NOTES
Encounter Date: 5/2/2017       History     Chief Complaint   Patient presents with    Rash     c/o dry, red, peeling skin to entire body since last night. States he was seen recently for similar problem to hands. Ran out of prescribed cream     Review of patient's allergies indicates:  No Known Allergies  Patient is a 56 y.o. male presenting with the following complaint: rash. The history is provided by the patient.   Rash    This is a recurrent problem. The current episode started two days ago. The problem has been rapidly worsening. Associated with: Dishwashing at work.  Affected Location: Diffuse worse on hands/forearms  The pain is at a severity of 5/10. The pain has been worsening since onset. Associated symptoms include itching, pain and weeping. Pertinent negatives include no blisters. Risk factors: Patient previuosly on a cream which he ran out of. Long history of difficult to control eczema.      Past Medical History:   Diagnosis Date    Dermatitis     Hypertension      Past Surgical History:   Procedure Laterality Date    HAND SURGERY      MANDIBLE FRACTURE SURGERY       History reviewed. No pertinent family history.  Social History   Substance Use Topics    Smoking status: Current Every Day Smoker     Packs/day: 1.00     Types: Cigarettes    Smokeless tobacco: None    Alcohol use 3.6 oz/week     6 Cans of beer per week      Comment: occ     Review of Systems   Constitutional: Negative for chills and fever.   HENT: Negative for sore throat.    Respiratory: Negative for shortness of breath.    Cardiovascular: Negative for chest pain.   Gastrointestinal: Negative for nausea.   Genitourinary: Negative for dysuria.   Musculoskeletal: Negative for back pain.   Skin: Positive for itching and rash.   Neurological: Negative for weakness.   Hematological: Does not bruise/bleed easily.   All other systems reviewed and are negative.      Physical Exam   Initial Vitals   BP Pulse Resp Temp SpO2   05/02/17 1756  05/02/17 1756 05/02/17 1756 05/02/17 1756 05/02/17 1756   189/98 84 20 97.9 °F (36.6 °C) 98 %     Physical Exam    Nursing note and vitals reviewed.  Constitutional: He appears well-developed and well-nourished.   HENT:   Head: Normocephalic and atraumatic.   Eyes: Conjunctivae and EOM are normal. Pupils are equal, round, and reactive to light.   Neck: Normal range of motion. Neck supple.   Cardiovascular: Normal rate, regular rhythm, normal heart sounds and intact distal pulses. Exam reveals no gallop and no friction rub.    No murmur heard.  Pulmonary/Chest: Breath sounds normal. No stridor. No respiratory distress. He has no wheezes. He has no rhonchi. He has no rales. He exhibits no tenderness.   Abdominal: Soft. Bowel sounds are normal. He exhibits no distension. There is no tenderness. There is no rebound and no guarding.   Musculoskeletal: Normal range of motion.   Neurological: He is alert and oriented to person, place, and time. He has normal strength. No cranial nerve deficit.   Skin: Skin is warm and dry. Rash noted.        Psychiatric: He has a normal mood and affect.         ED Course   Procedures  Labs Reviewed - No data to display          Medical Decision Making:   Initial Assessment:   Severe eczema without evidence of systemic infection but possible superinfection of some eczema lesions.  Recommended f/u with derm ASAP, topical and oral steroids, antibiotics                    ED Course     Clinical Impression:   The encounter diagnosis was Dyshidrotic eczema.    Disposition:   Disposition: Discharged  Condition: Stable       Nereida Iverson MD  05/16/17 7649

## 2017-06-18 ENCOUNTER — HOSPITAL ENCOUNTER (EMERGENCY)
Facility: HOSPITAL | Age: 57
Discharge: HOME | End: 2017-06-18
Attending: EMERGENCY MEDICINE
Payer: MEDICAID

## 2017-06-18 VITALS
HEIGHT: 74 IN | SYSTOLIC BLOOD PRESSURE: 169 MMHG | WEIGHT: 160 LBS | DIASTOLIC BLOOD PRESSURE: 104 MMHG | RESPIRATION RATE: 16 BRPM | HEART RATE: 73 BPM | TEMPERATURE: 98 F | BODY MASS INDEX: 20.53 KG/M2 | OXYGEN SATURATION: 98 %

## 2017-06-18 DIAGNOSIS — S81.812A LACERATION OF LEFT LOWER LEG, INITIAL ENCOUNTER: Primary | ICD-10-CM

## 2017-06-18 DIAGNOSIS — I10 ELEVATED BLOOD PRESSURE READING WITH DIAGNOSIS OF HYPERTENSION: ICD-10-CM

## 2017-06-18 PROCEDURE — 99283 EMERGENCY DEPT VISIT LOW MDM: CPT

## 2017-06-18 PROCEDURE — 25000003 PHARM REV CODE 250: Performed by: EMERGENCY MEDICINE

## 2017-06-18 RX ORDER — CEPHALEXIN 500 MG/1
500 CAPSULE ORAL 4 TIMES DAILY
Qty: 20 CAPSULE | Refills: 0 | Status: SHIPPED | OUTPATIENT
Start: 2017-06-18 | End: 2017-06-23

## 2017-06-18 RX ORDER — MUPIROCIN 20 MG/G
1 OINTMENT TOPICAL
Status: COMPLETED | OUTPATIENT
Start: 2017-06-18 | End: 2017-06-18

## 2017-06-18 RX ORDER — CEPHALEXIN 500 MG/1
500 CAPSULE ORAL
Status: DISCONTINUED | OUTPATIENT
Start: 2017-06-18 | End: 2017-06-18

## 2017-06-18 RX ADMIN — MUPIROCIN 22 G: 20 OINTMENT TOPICAL at 08:06

## 2017-06-19 NOTE — ED NOTES
"Patient identifiers for Anirudh David checked and correct.  LOC: The patient is awake, alert and aware of environment with an appropriate affect, the patient is oriented x 3 and speaking appropriately.  APPEARANCE: Patient resting comfortably and in no acute distress, patient is clean and well groomed, patient's clothing are properly fastened.  SKIN: Left "L" shape laceration to left lower extremity.  MUSKULOSKELETAL: Patient moving all extremities well.    "
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
## 2017-07-23 ENCOUNTER — HOSPITAL ENCOUNTER (EMERGENCY)
Facility: HOSPITAL | Age: 57
Discharge: HOME | End: 2017-07-23
Attending: EMERGENCY MEDICINE
Payer: MEDICAID

## 2017-07-23 VITALS
TEMPERATURE: 98 F | DIASTOLIC BLOOD PRESSURE: 78 MMHG | RESPIRATION RATE: 18 BRPM | HEIGHT: 74 IN | WEIGHT: 160 LBS | HEART RATE: 74 BPM | OXYGEN SATURATION: 97 % | BODY MASS INDEX: 20.53 KG/M2 | SYSTOLIC BLOOD PRESSURE: 148 MMHG

## 2017-07-23 DIAGNOSIS — T78.3XXA ANGIOEDEMA OF LIPS, INITIAL ENCOUNTER: Primary | ICD-10-CM

## 2017-07-23 PROCEDURE — 96375 TX/PRO/DX INJ NEW DRUG ADDON: CPT

## 2017-07-23 PROCEDURE — 63600175 PHARM REV CODE 636 W HCPCS: Performed by: EMERGENCY MEDICINE

## 2017-07-23 PROCEDURE — 99284 EMERGENCY DEPT VISIT MOD MDM: CPT | Mod: 25

## 2017-07-23 PROCEDURE — 25000003 PHARM REV CODE 250: Performed by: EMERGENCY MEDICINE

## 2017-07-23 PROCEDURE — 96374 THER/PROPH/DIAG INJ IV PUSH: CPT

## 2017-07-23 RX ORDER — FAMOTIDINE 10 MG/ML
20 INJECTION INTRAVENOUS ONCE
Status: COMPLETED | OUTPATIENT
Start: 2017-07-23 | End: 2017-07-23

## 2017-07-23 RX ORDER — METHYLPREDNISOLONE SOD SUCC 125 MG
125 VIAL (EA) INJECTION
Status: COMPLETED | OUTPATIENT
Start: 2017-07-23 | End: 2017-07-23

## 2017-07-23 RX ORDER — AMLODIPINE BESYLATE 5 MG/1
5 TABLET ORAL DAILY
Qty: 30 TABLET | Refills: 6 | Status: ON HOLD | OUTPATIENT
Start: 2017-07-23 | End: 2020-01-01 | Stop reason: HOSPADM

## 2017-07-23 RX ORDER — DIPHENHYDRAMINE HYDROCHLORIDE 50 MG/ML
25 INJECTION INTRAMUSCULAR; INTRAVENOUS
Status: COMPLETED | OUTPATIENT
Start: 2017-07-23 | End: 2017-07-23

## 2017-07-23 RX ADMIN — DIPHENHYDRAMINE HYDROCHLORIDE 25 MG: 50 INJECTION, SOLUTION INTRAMUSCULAR; INTRAVENOUS at 09:07

## 2017-07-23 RX ADMIN — METHYLPREDNISOLONE SODIUM SUCCINATE 125 MG: 125 INJECTION, POWDER, FOR SOLUTION INTRAMUSCULAR; INTRAVENOUS at 09:07

## 2017-07-23 RX ADMIN — FAMOTIDINE 20 MG: 10 INJECTION INTRAVENOUS at 09:07

## 2017-07-23 NOTE — ED NOTES
Pt awake, alert, states that it feels like the swelling is going down a little. Swelling noted to pt upper lip.

## 2017-07-23 NOTE — ED NOTES
APPEARANCE: Alert, oriented and in no acute distress.  CARDIAC: Normal rate and rhythm, no murmur heard.   PERIPHERAL VASCULAR: peripheral pulses present. Normal cap refill. No edema. Warm to touch.    RESPIRATORY:Normal rate and effort, breath sounds clear bilaterally throughout chest. Respirations are equal and unlabored no obvious signs of distress.  GASTRO: soft, bowel sounds normal, no tenderness, no abdominal distention.  MUSC: Full ROM. No bony tenderness or soft tissue tenderness. No obvious deformity.  SKIN: Skin is warm and dry, normal skin turgor, mucous membranes moist.  NEURO: 5/5 strength major flexors/extensors bilaterally. Sensory intact to light touch bilaterally. Garwood coma scale: eyes open spontaneously-4, oriented & converses-5, obeys commands-6. No neurological abnormalities.   MENTAL STATUS: awake, alert and aware of environment.  EYE: PERRL, both eyes: pupils brisk and reactive to light. Normal size.  ENT: EARS: no obvious drainage. NOSE: no active bleeding. + swollen upper lip.  BREAST: symmetrical. No masses. No tenderness.  GENITALIA: Normal external genitalia.

## 2017-07-23 NOTE — ED PROVIDER NOTES
Encounter Date: 7/23/2017       History     Chief Complaint   Patient presents with    Facial Swelling     Woke up with facial swelling.  No shortness of breath, no trouble breathing.  Patient is currently on Lisinopril.  Last time he took it was yesterday     Patient is a 57-year-old male who awoke this morning with swelling of his upper lip.  He denies difficulty breathing or swallowing.  Patient has a history of hypertension and is currently on lisinopril, with his last dose being taken yesterday.  He has no prior history of this reaction.  No chest pain or shortness of breath.      The history is provided by the patient.     Review of patient's allergies indicates:  No Known Allergies  Past Medical History:   Diagnosis Date    Dermatitis     Hypertension      Past Surgical History:   Procedure Laterality Date    HAND SURGERY      MANDIBLE FRACTURE SURGERY       History reviewed. No pertinent family history.  Social History   Substance Use Topics    Smoking status: Current Every Day Smoker     Packs/day: 1.00     Types: Cigarettes    Smokeless tobacco: Never Used    Alcohol use 3.6 oz/week     6 Cans of beer per week      Comment: occ     Review of Systems   Constitutional: Negative for chills and fever.   HENT: Positive for facial swelling. Negative for trouble swallowing.    Respiratory: Negative for shortness of breath.    Gastrointestinal: Negative for abdominal pain, nausea and vomiting.   Skin: Negative for color change and rash.   Neurological: Negative for light-headedness and headaches.       Physical Exam     Initial Vitals [07/23/17 0827]   BP Pulse Resp Temp SpO2   (!) 151/84 67 16 98.1 °F (36.7 °C) 96 %      MAP       106.33         Physical Exam    Nursing note and vitals reviewed.  Constitutional: No distress.   HENT:   Profuse swelling of the upper lip.  No intraoral or posterior pharyngeal swelling.   Eyes: EOM are normal. Pupils are equal, round, and reactive to light.   Neck: Normal  range of motion. Neck supple.   Cardiovascular: Normal rate, regular rhythm and normal heart sounds.   Pulmonary/Chest: Breath sounds normal.   Abdominal: There is no tenderness.   Musculoskeletal: Normal range of motion. He exhibits no edema.   Neurological: He is alert and oriented to person, place, and time.   Skin: Skin is warm and dry.   Psychiatric: His behavior is normal. Thought content normal.         ED Course   Procedures  Labs Reviewed - No data to display          Medical Decision Making:   ED Management:  57-year-old male with angioedema from lisinopril.  Patient had swelling mainly confined to his upper lip.  He was given Cipro Medrol, Benadryl and Pepcid here in the ED.  After a period of observation, swelling decreased.  He was discharged home in good condition and advised to stop taking lisinopril.  I have written him a prescription for Norvasc.  He will follow-up with his physician as soon as able for recheck and further treatment as warranted.                   ED Course     Clinical Impression:   The encounter diagnosis was Angioedema of lips, initial encounter.                           Sin Plascencia MD  07/23/17 3335

## 2017-08-28 ENCOUNTER — HOSPITAL ENCOUNTER (EMERGENCY)
Facility: HOSPITAL | Age: 57
Discharge: HOME | End: 2017-08-28
Attending: EMERGENCY MEDICINE
Payer: MEDICAID

## 2017-08-28 VITALS
HEIGHT: 74 IN | HEART RATE: 78 BPM | WEIGHT: 162 LBS | DIASTOLIC BLOOD PRESSURE: 70 MMHG | TEMPERATURE: 97 F | SYSTOLIC BLOOD PRESSURE: 138 MMHG | OXYGEN SATURATION: 98 % | BODY MASS INDEX: 20.79 KG/M2 | RESPIRATION RATE: 16 BRPM

## 2017-08-28 DIAGNOSIS — L30.9 DERMATITIS: Primary | ICD-10-CM

## 2017-08-28 DIAGNOSIS — L30.9 ECZEMA, UNSPECIFIED TYPE: ICD-10-CM

## 2017-08-28 PROCEDURE — 25000003 PHARM REV CODE 250: Performed by: NURSE PRACTITIONER

## 2017-08-28 PROCEDURE — 99283 EMERGENCY DEPT VISIT LOW MDM: CPT

## 2017-08-28 RX ORDER — NAPROXEN 500 MG/1
500 TABLET ORAL
Status: COMPLETED | OUTPATIENT
Start: 2017-08-28 | End: 2017-08-28

## 2017-08-28 RX ADMIN — NAPROXEN 500 MG: 500 TABLET ORAL at 02:08

## 2017-08-28 NOTE — ED PROVIDER NOTES
"Encounter Date: 8/28/2017       History     Chief Complaint   Patient presents with    Other     skin to both hands is dry and cracking     58yo male with pmhx of HTN and eczema is here for an exacerbation of his bilateral hand rash.  Pt reports that he has had this rash for over three months, and he is scheduled to see dermatology next month.  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  He reports that over the past several days, the pain and swelling has gotten worse.  He denies trauma, fever/chills, wound, and vomiting.  He reports bilateral hand pain which is worse with palpation and movement.  He has been using some atarax to help with the itching, but he is awaiting his refill which is currently at the pharmacy.  No drainge from his hands, but he does report they're "cracking."  He usually wears gloves (nitrile) to help with pain as he feels that when his hands dry, the pain becomes worse.        The history is provided by the patient.   Rash    This is a recurrent problem. The current episode started several weeks ago. The problem has been gradually worsening. The problem is associated with a new detergent/soap. Affected Location: bilateral hands and forearms. The pain is at a severity of 8/10. The pain has been constant since onset. Associated symptoms include itching and pain. Pertinent negatives include no blisters. He has tried antihistamines and cold cream for the symptoms. The treatment provided no relief.     Review of patient's allergies indicates:  No Known Allergies  Past Medical History:   Diagnosis Date    Dermatitis     Hypertension      Past Surgical History:   Procedure Laterality Date    HAND SURGERY      MANDIBLE FRACTURE SURGERY       History reviewed. No pertinent family history.  Social History   Substance Use Topics    Smoking status: Current Every Day Smoker     Packs/day: 1.00     Types: Cigarettes    Smokeless tobacco: " Never Used    Alcohol use 3.6 oz/week     6 Cans of beer per week      Comment: occ     Review of Systems   Constitutional: Negative for chills and fever.   HENT: Negative for congestion.    Respiratory: Negative for cough.    Cardiovascular: Negative for chest pain.   Gastrointestinal: Negative for abdominal pain and vomiting.   Musculoskeletal: Positive for arthralgias.   Skin: Positive for itching and rash.   Allergic/Immunologic: Negative for immunocompromised state.   Neurological: Negative for weakness and numbness.   Hematological: Does not bruise/bleed easily.   Psychiatric/Behavioral: Negative for confusion.       Physical Exam     Initial Vitals [08/28/17 1301]   BP Pulse Resp Temp SpO2   134/83 92 18 98.2 °F (36.8 °C) 97 %      MAP       100         Physical Exam    Nursing note and vitals reviewed.  Constitutional: Vital signs are normal. He appears well-developed and well-nourished. He is cooperative. He is easily aroused.  Non-toxic appearance. He does not have a sickly appearance. He does not appear ill. No distress.   HENT:   Head: Normocephalic and atraumatic.   Eyes: Conjunctivae are normal.   Neck: Normal range of motion.   Cardiovascular: Normal rate.   Pulmonary/Chest: Effort normal.   Abdominal: Soft. Normal appearance and bowel sounds are normal. There is no tenderness.   Musculoskeletal:        Right hand: He exhibits swelling. He exhibits normal range of motion, no tenderness, no bony tenderness, normal two-point discrimination, normal capillary refill, no deformity and no laceration. Normal sensation noted.        Left hand: He exhibits swelling. He exhibits normal range of motion, no tenderness, no bony tenderness, normal two-point discrimination, normal capillary refill, no deformity and no laceration. Normal sensation noted. Normal strength noted.   Neurological: He is alert, oriented to person, place, and time and easily aroused. He has normal strength. No sensory deficit. GCS eye  subscore is 4. GCS verbal subscore is 5. GCS motor subscore is 6.   Skin: Skin is warm, dry and intact. No rash noted. There is erythema.   Thickened erythematous skin of bilateral hands and forearms.  Several small cracks in folds. NO abscess.    Psychiatric: He has a normal mood and affect. His speech is normal and behavior is normal. Judgment and thought content normal. Cognition and memory are normal.         ED Course   Procedures  Labs Reviewed - No data to display          Medical Decision Making:   Initial Assessment:   57-year-old male with a past medical history of hypertension and eczema is here for recurrent and worsening rashes bilateral hands and forearms.  He has been diagnosed with eczema in the past, but reports the rash is worse than usual.  No trauma, fever/chills, or wound.  He reports that he is going to see a dermatologist next month.  The patient appears well, nontoxic.  He has erythematous and thickened skin to bilateral hands and forearms which is consistent with eczema.  No visualized abscess.  Differential Diagnosis:   Eczema, contact dermatitis, cellulitis  ED Management:  Exam  Rash consistent with eczema. No signs of infection.  Pt advised to f/u with dermatology at first available appointment.  There is no indication for labs or imaging at this time.  No indication for abx.  Advised to use lotion TID and return to the ED if condition changes, progresses, or if there are concerns.  Pt verbalized understanding, compliance, and agreement with the treatment plan.                Attending Attestation:     Physician Attestation Statement for NP/PA:   I have conducted a face to face encounter with this patient in addition to the NP/PA, due to NP/PA Request    Other NP/PA Attestation Additions:    History of Present Illness: 57M with hx of eczema presents with worsening bilateral hand drying and cracking.    Medical Decision Making: Hands are dry, flaky, cracking - no signs of infection.   Treat with dimethicone cream - follow up with derm.  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                 ED Course     Clinical Impression:   The primary encounter diagnosis was Dermatitis. A diagnosis of Eczema, unspecified type was also pertinent to this visit.                           Niurka Alonzo, AMY  08/28/17 1429       Diane Mejia MD  08/31/17 4892

## 2017-08-28 NOTE — ED NOTES
Bilateral hand pain/swelling and severely flaking skin with hx of eczema.  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

## 2017-08-28 NOTE — DISCHARGE INSTRUCTIONS
Use the lotion at least three times a day.  Follow up with your dermatologist at first available appointment.

## 2018-05-16 ENCOUNTER — HOSPITAL ENCOUNTER (EMERGENCY)
Facility: HOSPITAL | Age: 58
Discharge: HOME | End: 2018-05-16
Attending: EMERGENCY MEDICINE
Payer: MEDICAID

## 2018-05-16 VITALS
WEIGHT: 155 LBS | RESPIRATION RATE: 18 BRPM | TEMPERATURE: 99 F | BODY MASS INDEX: 19.89 KG/M2 | HEART RATE: 76 BPM | OXYGEN SATURATION: 99 % | SYSTOLIC BLOOD PRESSURE: 144 MMHG | DIASTOLIC BLOOD PRESSURE: 82 MMHG | HEIGHT: 74 IN

## 2018-05-16 DIAGNOSIS — F10.920 ALCOHOLIC INTOXICATION WITHOUT COMPLICATION: ICD-10-CM

## 2018-05-16 DIAGNOSIS — S41.111A LACERATION OF RIGHT UPPER EXTREMITY, INITIAL ENCOUNTER: Primary | ICD-10-CM

## 2018-05-16 PROCEDURE — 12002 RPR S/N/AX/GEN/TRNK2.6-7.5CM: CPT

## 2018-05-16 PROCEDURE — 99284 EMERGENCY DEPT VISIT MOD MDM: CPT | Mod: 25
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
## 2018-05-16 NOTE — ED PROVIDER NOTES
Encounter Date: 5/16/2018    SCRIBE #1 NOTE: I, Foreign Meehan, am scribing for, and in the presence of, Dr. Hunter Craig.       History     Chief Complaint   Patient presents with    Fall     Tripped and fell after drinking 2 beers.  Has multiple abrasions to left forearm and elbows and possible right hand.  No LOC.  Patient was picked up at [facility].     Anirudh David is a 57 y.o. male who  has a past medical history of Dermatitis and Hypertension.    The patient presents to the ED via EMS secondary to a fall just prior to arrival. EMS states that they picked the patient up from a [facility] after he tripped and fell. EMS reports that the patient has multiple superficial abrasions to his left forearm and elbows. EMS reports that the patient denies any loss of consciousness. EMS reports that the patient has been drinking alcohol today. A full HPI is unavailable at this time due to the patient's intoxication making him a poor historian.         The history is provided by the EMS personnel. The history is limited by the condition of the patient. No  was used.     Review of patient's allergies indicates:  No Known Allergies  Past Medical History:   Diagnosis Date    Dermatitis     Hypertension      Past Surgical History:   Procedure Laterality Date    HAND SURGERY      MANDIBLE FRACTURE SURGERY       No family history on file.  Social History   Substance Use Topics    Smoking status: Current Every Day Smoker     Packs/day: 1.00     Types: Cigarettes    Smokeless tobacco: Never Used    Alcohol use 3.6 oz/week     6 Cans of beer per week      Comment: occ     Review of Systems   Unable to perform ROS: Other       Physical Exam     Initial Vitals [05/16/18 1514]   BP Pulse Resp Temp SpO2   (!) 144/82 76 18 98.6 °F (37 °C) 99 %      MAP       102.67         Physical Exam    Nursing note and vitals reviewed.  Constitutional: He appears well-developed and well-nourished. He is not  diaphoretic. No distress.   Upon arrival patient appears intoxicated.    HENT:   Head: Normocephalic and atraumatic.   Mouth/Throat: Oropharynx is clear and moist.   Eyes: Conjunctivae and EOM are normal. Pupils are equal, round, and reactive to light. No scleral icterus.   Neck: Normal range of motion. Neck supple.   Cardiovascular: Normal rate, regular rhythm and normal heart sounds. Exam reveals no gallop and no friction rub.    No murmur heard.  Pulmonary/Chest: Breath sounds normal. No respiratory distress. He has no wheezes. He has no rhonchi. He has no rales.   Abdominal: Soft. He exhibits no distension. There is no tenderness. There is no rebound and no guarding.   Musculoskeletal: Normal range of motion.   Neurological: He is alert and oriented to person, place, and time.   Skin: Skin is warm and dry. No erythema. No pallor.   Superficial laceration to the volar aspect of the right forearm.    Psychiatric: He has a normal mood and affect. His behavior is normal.         ED Course   Lac Repair  Date/Time: 5/16/2018 4:29 PM  Performed by: MARIANELA CROW.  Authorized by: MARIANELA CROW.   Consent Done: Not Needed  Body area: upper extremity  Location details: right upper arm  Laceration length: 3 cm  Foreign bodies: no foreign bodies  Tendon involvement: none  Nerve involvement: none  Vascular damage: no  Patient sedated: no  Amount of cleaning: standard  Subcutaneous closure: 3-0 Vicryl  Number of sutures: 2  Patient tolerance: Patient tolerated the procedure well with no immediate complications  Comments: 3cm laceration with arterial bleeding        Labs Reviewed - No data to display          Medical Decision Making:   ED Management:  Pt intoxicated and verbally abusive to staff. Pt had laceration to right forearm with arterial bleeding which was repaired with 2 figure of 8 sutures. Police escorted patient to alf after laceration repair              Attending Attestation:           Physician Attestation  for Scribe:  Physician Attestation Statement for Scribe #1: I, Hunter Craig, reviewed documentation, as scribed by Pan Meehan in my presence, and it is both accurate and complete.                    Clinical Impression:   The primary encounter diagnosis was Laceration of right upper extremity, initial encounter. A diagnosis of Alcoholic intoxication without complication was also pertinent to this visit.    Disposition:   Disposition: Discharged  Condition: Stable                        Hunter Craig MD  05/18/18 0133

## 2019-09-07 ENCOUNTER — HOSPITAL ENCOUNTER (EMERGENCY)
Facility: HOSPITAL | Age: 59
Discharge: HOME | End: 2019-09-07
Attending: EMERGENCY MEDICINE
Payer: COMMERCIAL

## 2019-09-07 VITALS
SYSTOLIC BLOOD PRESSURE: 148 MMHG | RESPIRATION RATE: 20 BRPM | WEIGHT: 162 LBS | DIASTOLIC BLOOD PRESSURE: 85 MMHG | BODY MASS INDEX: 20.8 KG/M2 | HEART RATE: 82 BPM | OXYGEN SATURATION: 95 % | TEMPERATURE: 99 F
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
## 2019-09-07 DIAGNOSIS — S39.012A LUMBAR STRAIN, INITIAL ENCOUNTER: Primary | ICD-10-CM

## 2019-09-07 DIAGNOSIS — M51.36 DEGENERATIVE DISC DISEASE, LUMBAR: ICD-10-CM

## 2019-09-07 PROCEDURE — 99283 EMERGENCY DEPT VISIT LOW MDM: CPT | Mod: 25

## 2019-09-07 PROCEDURE — 25000003 PHARM REV CODE 250: Performed by: NURSE PRACTITIONER

## 2019-09-07 RX ORDER — IBUPROFEN 400 MG/1
800 TABLET ORAL
Status: COMPLETED | OUTPATIENT
Start: 2019-09-07 | End: 2019-09-07

## 2019-09-07 RX ORDER — IBUPROFEN 800 MG/1
800 TABLET ORAL EVERY 6 HOURS PRN
Qty: 20 TABLET | Refills: 0 | OUTPATIENT
Start: 2019-09-07 | End: 2020-01-20

## 2019-09-07 RX ORDER — METHOCARBAMOL 500 MG/1
500 TABLET, FILM COATED ORAL
Status: COMPLETED | OUTPATIENT
Start: 2019-09-07 | End: 2019-09-07

## 2019-09-07 RX ADMIN — METHOCARBAMOL TABLETS 500 MG: 500 TABLET, COATED ORAL at 06:09

## 2019-09-07 RX ADMIN — IBUPROFEN 800 MG: 400 TABLET, FILM COATED ORAL at 06:09
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
## 2020-01-01 ENCOUNTER — TELEPHONE (OUTPATIENT)
Dept: SMOKING CESSATION | Facility: CLINIC | Age: 60
End: 2020-01-01

## 2020-01-01 ENCOUNTER — CLINICAL SUPPORT (OUTPATIENT)
Dept: SMOKING CESSATION | Facility: CLINIC | Age: 60
End: 2020-01-01
Payer: COMMERCIAL

## 2020-01-01 ENCOUNTER — HOSPITAL ENCOUNTER (OUTPATIENT)
Facility: HOSPITAL | Age: 60
Discharge: HOME | End: 2020-04-26
Attending: EMERGENCY MEDICINE | Admitting: INTERNAL MEDICINE
Payer: MEDICAID

## 2020-01-01 ENCOUNTER — HOSPITAL ENCOUNTER (OUTPATIENT)
Dept: PULMONOLOGY | Facility: HOSPITAL | Age: 60
Discharge: HOME | End: 2020-06-22
Attending: PHYSICIAN ASSISTANT
Payer: MEDICAID

## 2020-01-01 ENCOUNTER — OFFICE VISIT (OUTPATIENT)
Dept: FAMILY MEDICINE | Facility: HOSPITAL | Age: 60
End: 2020-01-01
Attending: FAMILY MEDICINE
Payer: MEDICAID

## 2020-01-01 ENCOUNTER — HOSPITAL ENCOUNTER (OUTPATIENT)
Facility: HOSPITAL | Age: 60
Discharge: HOME | End: 2020-09-25
Attending: EMERGENCY MEDICINE | Admitting: INTERNAL MEDICINE
Payer: MEDICAID

## 2020-01-01 ENCOUNTER — HOSPITAL ENCOUNTER (EMERGENCY)
Facility: HOSPITAL | Age: 60
Discharge: HOME | End: 2020-04-15
Attending: EMERGENCY MEDICINE
Payer: MEDICAID

## 2020-01-01 ENCOUNTER — HOSPITAL ENCOUNTER (EMERGENCY)
Facility: HOSPITAL | Age: 60
Discharge: OTHER INSTITUTION | End: 2020-11-22
Attending: EMERGENCY MEDICINE
Payer: MEDICAID

## 2020-01-01 ENCOUNTER — HOSPITAL ENCOUNTER (EMERGENCY)
Facility: HOSPITAL | Age: 60
Discharge: HOME | End: 2020-04-09
Attending: EMERGENCY MEDICINE
Payer: MEDICAID

## 2020-01-01 ENCOUNTER — HOSPITAL ENCOUNTER (OUTPATIENT)
Facility: HOSPITAL | Age: 60
LOS: 1 days | Discharge: HOME | End: 2020-04-21
Attending: EMERGENCY MEDICINE | Admitting: INTERNAL MEDICINE
Payer: MEDICAID

## 2020-01-01 VITALS
SYSTOLIC BLOOD PRESSURE: 130 MMHG | BODY MASS INDEX: 20.53 KG/M2 | TEMPERATURE: 97 F | DIASTOLIC BLOOD PRESSURE: 91 MMHG | RESPIRATION RATE: 17 BRPM | OXYGEN SATURATION: 99 % | BODY MASS INDEX: 21.2 KG/M2 | DIASTOLIC BLOOD PRESSURE: 105 MMHG | WEIGHT: 160 LBS | HEART RATE: 92 BPM | OXYGEN SATURATION: 98 % | HEIGHT: 73 IN | WEIGHT: 160 LBS | TEMPERATURE: 98 F | SYSTOLIC BLOOD PRESSURE: 143 MMHG | HEART RATE: 101 BPM | HEIGHT: 74 IN | RESPIRATION RATE: 19 BRPM

## 2020-01-01 VITALS
TEMPERATURE: 98 F | BODY MASS INDEX: 18.79 KG/M2 | HEIGHT: 74 IN | WEIGHT: 146.38 LBS | OXYGEN SATURATION: 94 % | DIASTOLIC BLOOD PRESSURE: 90 MMHG | RESPIRATION RATE: 18 BRPM | HEART RATE: 75 BPM | SYSTOLIC BLOOD PRESSURE: 138 MMHG

## 2020-01-01 VITALS
SYSTOLIC BLOOD PRESSURE: 118 MMHG | TEMPERATURE: 98 F | WEIGHT: 163 LBS | DIASTOLIC BLOOD PRESSURE: 82 MMHG | OXYGEN SATURATION: 96 % | HEART RATE: 108 BPM | RESPIRATION RATE: 21 BRPM | BODY MASS INDEX: 22.11 KG/M2

## 2020-01-01 VITALS
DIASTOLIC BLOOD PRESSURE: 94 MMHG | BODY MASS INDEX: 17.71 KG/M2 | TEMPERATURE: 98 F | HEIGHT: 74 IN | RESPIRATION RATE: 20 BRPM | SYSTOLIC BLOOD PRESSURE: 150 MMHG | OXYGEN SATURATION: 97 % | HEART RATE: 70 BPM | WEIGHT: 138 LBS

## 2020-01-01 VITALS — WEIGHT: 150 LBS | BODY MASS INDEX: 20.32 KG/M2 | HEIGHT: 72 IN

## 2020-01-01 VITALS
DIASTOLIC BLOOD PRESSURE: 86 MMHG | WEIGHT: 150 LBS | HEART RATE: 97 BPM | SYSTOLIC BLOOD PRESSURE: 143 MMHG | TEMPERATURE: 101 F | OXYGEN SATURATION: 100 % | RESPIRATION RATE: 32 BRPM | HEIGHT: 72 IN | BODY MASS INDEX: 20.32 KG/M2

## 2020-01-01 DIAGNOSIS — I50.9 CONGESTIVE HEART FAILURE, UNSPECIFIED HF CHRONICITY, UNSPECIFIED HEART FAILURE TYPE: ICD-10-CM

## 2020-01-01 DIAGNOSIS — I31.39 PERICARDIAL EFFUSION: ICD-10-CM

## 2020-01-01 DIAGNOSIS — R91.1 PULMONARY NODULE, LEFT: ICD-10-CM

## 2020-01-01 DIAGNOSIS — M54.42 BILATERAL LOW BACK PAIN WITH BILATERAL SCIATICA, UNSPECIFIED CHRONICITY: ICD-10-CM

## 2020-01-01 DIAGNOSIS — F14.90 COCAINE USE: ICD-10-CM

## 2020-01-01 DIAGNOSIS — B18.2 CHRONIC HEPATITIS C WITHOUT HEPATIC COMA: ICD-10-CM

## 2020-01-01 DIAGNOSIS — I50.42 CHRONIC COMBINED SYSTOLIC AND DIASTOLIC HEART FAILURE: Primary | ICD-10-CM

## 2020-01-01 DIAGNOSIS — I34.0 SEVERE MITRAL REGURGITATION: ICD-10-CM

## 2020-01-01 DIAGNOSIS — N17.9 AKI (ACUTE KIDNEY INJURY): ICD-10-CM

## 2020-01-01 DIAGNOSIS — I10 ESSENTIAL HYPERTENSION: ICD-10-CM

## 2020-01-01 DIAGNOSIS — I27.20 PULMONARY HYPERTENSION: ICD-10-CM

## 2020-01-01 DIAGNOSIS — F17.210 CIGARETTE NICOTINE DEPENDENCE, UNCOMPLICATED: Primary | ICD-10-CM

## 2020-01-01 DIAGNOSIS — J90 PLEURAL EFFUSION: ICD-10-CM

## 2020-01-01 DIAGNOSIS — R79.89 TROPONIN LEVEL ELEVATED: ICD-10-CM

## 2020-01-01 DIAGNOSIS — R79.89 ELEVATED TROPONIN: ICD-10-CM

## 2020-01-01 DIAGNOSIS — F17.200 SMOKER: ICD-10-CM

## 2020-01-01 DIAGNOSIS — M54.41 BILATERAL LOW BACK PAIN WITH BILATERAL SCIATICA, UNSPECIFIED CHRONICITY: ICD-10-CM

## 2020-01-01 DIAGNOSIS — D62 ACUTE ON CHRONIC BLOOD LOSS ANEMIA: ICD-10-CM

## 2020-01-01 DIAGNOSIS — I24.89 DEMAND ISCHEMIA: ICD-10-CM

## 2020-01-01 DIAGNOSIS — J18.9 PNEUMONIA OF RIGHT LOWER LOBE DUE TO INFECTIOUS ORGANISM: Primary | ICD-10-CM

## 2020-01-01 DIAGNOSIS — E87.1 HYPONATREMIA: ICD-10-CM

## 2020-01-01 DIAGNOSIS — I50.42 CHRONIC COMBINED SYSTOLIC AND DIASTOLIC HEART FAILURE: ICD-10-CM

## 2020-01-01 DIAGNOSIS — I50.9 CONGESTIVE HEART FAILURE, UNSPECIFIED HF CHRONICITY, UNSPECIFIED HEART FAILURE TYPE: Primary | ICD-10-CM

## 2020-01-01 DIAGNOSIS — R06.02 SHORTNESS OF BREATH: ICD-10-CM

## 2020-01-01 DIAGNOSIS — J43.2 CENTRILOBULAR EMPHYSEMA: ICD-10-CM

## 2020-01-01 DIAGNOSIS — F14.10 COCAINE ABUSE: ICD-10-CM

## 2020-01-01 DIAGNOSIS — Z20.822 SUSPECTED COVID-19 VIRUS INFECTION: ICD-10-CM

## 2020-01-01 DIAGNOSIS — R41.82 AMS (ALTERED MENTAL STATUS): ICD-10-CM

## 2020-01-01 DIAGNOSIS — R31.0 GROSS HEMATURIA: ICD-10-CM

## 2020-01-01 DIAGNOSIS — E87.6 HYPOKALEMIA: ICD-10-CM

## 2020-01-01 DIAGNOSIS — I61.9 BRAIN BLEED: Primary | ICD-10-CM

## 2020-01-01 DIAGNOSIS — I50.42 CHRONIC COMBINED SYSTOLIC AND DIASTOLIC CONGESTIVE HEART FAILURE: ICD-10-CM

## 2020-01-01 DIAGNOSIS — B18.2 CHRONIC HEPATITIS C WITHOUT HEPATIC COMA: Primary | ICD-10-CM

## 2020-01-01 DIAGNOSIS — J96.01 ACUTE HYPOXEMIC RESPIRATORY FAILURE: ICD-10-CM

## 2020-01-01 DIAGNOSIS — D64.9 ANEMIA: ICD-10-CM

## 2020-01-01 DIAGNOSIS — R91.1 PULMONARY NODULE: ICD-10-CM

## 2020-01-01 DIAGNOSIS — I50.43 ACUTE ON CHRONIC COMBINED SYSTOLIC AND DIASTOLIC HEART FAILURE: Primary | ICD-10-CM

## 2020-01-01 DIAGNOSIS — I50.43 ACUTE ON CHRONIC COMBINED SYSTOLIC AND DIASTOLIC HEART FAILURE: ICD-10-CM

## 2020-01-01 DIAGNOSIS — R31.9 HEMATURIA, UNSPECIFIED TYPE: ICD-10-CM

## 2020-01-01 DIAGNOSIS — I50.9 CHRONIC CONGESTIVE HEART FAILURE, UNSPECIFIED HEART FAILURE TYPE: Primary | ICD-10-CM

## 2020-01-01 DIAGNOSIS — D64.9 ANEMIA REQUIRING TRANSFUSIONS: Primary | ICD-10-CM

## 2020-01-01 DIAGNOSIS — R79.89 ELEVATED BRAIN NATRIURETIC PEPTIDE (BNP) LEVEL: ICD-10-CM

## 2020-01-01 LAB
ABO + RH BLD: NORMAL
ALBUMIN SERPL BCP-MCNC: 3.2 G/DL (ref 3.5–5.2)
ALBUMIN SERPL BCP-MCNC: 3.4 G/DL (ref 3.5–5.2)
ALBUMIN SERPL BCP-MCNC: 3.4 G/DL (ref 3.5–5.2)
ALBUMIN SERPL BCP-MCNC: 3.6 G/DL (ref 3.5–5.2)
ALBUMIN SERPL BCP-MCNC: 3.8 G/DL (ref 3.5–5.2)
ALBUMIN SERPL BCP-MCNC: 4 G/DL (ref 3.5–5.2)
ALLENS TEST: ABNORMAL
ALLENS TEST: ABNORMAL
ALP SERPL-CCNC: 100 U/L (ref 55–135)
ALP SERPL-CCNC: 109 U/L (ref 55–135)
ALP SERPL-CCNC: 113 U/L (ref 55–135)
ALP SERPL-CCNC: 123 U/L (ref 55–135)
ALP SERPL-CCNC: 66 U/L (ref 55–135)
ALP SERPL-CCNC: 68 U/L (ref 55–135)
ALP SERPL-CCNC: 69 U/L (ref 55–135)
ALP SERPL-CCNC: 74 U/L (ref 55–135)
ALT SERPL W/O P-5'-P-CCNC: 138 U/L (ref 10–44)
ALT SERPL W/O P-5'-P-CCNC: 14 U/L (ref 10–44)
ALT SERPL W/O P-5'-P-CCNC: 15 U/L (ref 10–44)
ALT SERPL W/O P-5'-P-CCNC: 150 U/L (ref 10–44)
ALT SERPL W/O P-5'-P-CCNC: 16 U/L (ref 10–44)
ALT SERPL W/O P-5'-P-CCNC: 19 U/L (ref 10–44)
ALT SERPL W/O P-5'-P-CCNC: 89 U/L (ref 10–44)
ALT SERPL W/O P-5'-P-CCNC: 95 U/L (ref 10–44)
AMPHET+METHAMPHET UR QL: NEGATIVE
ANION GAP SERPL CALC-SCNC: 11 MMOL/L (ref 8–16)
ANION GAP SERPL CALC-SCNC: 12 MMOL/L (ref 8–16)
ANION GAP SERPL CALC-SCNC: 13 MMOL/L (ref 8–16)
ANION GAP SERPL CALC-SCNC: 13 MMOL/L (ref 8–16)
ANION GAP SERPL CALC-SCNC: 14 MMOL/L (ref 8–16)
ANION GAP SERPL CALC-SCNC: 18 MMOL/L (ref 8–16)
ANION GAP SERPL CALC-SCNC: 9 MMOL/L (ref 8–16)
ANISOCYTOSIS BLD QL SMEAR: ABNORMAL
ANISOCYTOSIS BLD QL SMEAR: ABNORMAL
AORTIC ROOT ANNULUS: 3.65 CM
APTT BLDCRRT: 25.9 SEC (ref 21–32)
APTT BLDCRRT: 29.3 SEC (ref 21–32)
ASCENDING AORTA: 2.73 CM
AST SERPL-CCNC: 104 U/L (ref 10–40)
AST SERPL-CCNC: 113 U/L (ref 10–40)
AST SERPL-CCNC: 114 U/L (ref 10–40)
AST SERPL-CCNC: 138 U/L (ref 10–40)
AST SERPL-CCNC: 193 U/L (ref 10–40)
AST SERPL-CCNC: 23 U/L (ref 10–40)
AST SERPL-CCNC: 80 U/L (ref 10–40)
AST SERPL-CCNC: 84 U/L (ref 10–40)
AV INDEX (PROSTH): 0.5
AV MEAN GRADIENT: 6 MMHG
AV PEAK GRADIENT: 10 MMHG
AV VALVE AREA: 2.26 CM2
AV VELOCITY RATIO: 0.43
BACTERIA #/AREA URNS HPF: ABNORMAL /HPF
BACTERIA BLD CULT: NORMAL
BACTERIA UR CULT: NO GROWTH
BARBITURATES UR QL SCN>200 NG/ML: NEGATIVE
BASOPHILS # BLD AUTO: 0.01 K/UL (ref 0–0.2)
BASOPHILS # BLD AUTO: 0.03 K/UL (ref 0–0.2)
BASOPHILS # BLD AUTO: 0.03 K/UL (ref 0–0.2)
BASOPHILS # BLD AUTO: 0.04 K/UL (ref 0–0.2)
BASOPHILS # BLD AUTO: 0.05 K/UL (ref 0–0.2)
BASOPHILS # BLD AUTO: 0.06 K/UL (ref 0–0.2)
BASOPHILS NFR BLD: 0.2 % (ref 0–1.9)
BASOPHILS NFR BLD: 0.6 % (ref 0–1.9)
BASOPHILS NFR BLD: 0.7 % (ref 0–1.9)
BASOPHILS NFR BLD: 0.8 % (ref 0–1.9)
BASOPHILS NFR BLD: 1 % (ref 0–1.9)
BENZODIAZ UR QL SCN>200 NG/ML: NEGATIVE
BILIRUB SERPL-MCNC: 1.4 MG/DL (ref 0.1–1)
BILIRUB SERPL-MCNC: 1.4 MG/DL (ref 0.1–1)
BILIRUB SERPL-MCNC: 1.8 MG/DL (ref 0.1–1)
BILIRUB SERPL-MCNC: 2.1 MG/DL (ref 0.1–1)
BILIRUB SERPL-MCNC: 2.5 MG/DL (ref 0.1–1)
BILIRUB SERPL-MCNC: 2.5 MG/DL (ref 0.1–1)
BILIRUB SERPL-MCNC: 2.6 MG/DL (ref 0.1–1)
BILIRUB SERPL-MCNC: 3.1 MG/DL (ref 0.1–1)
BILIRUB UR QL STRIP: ABNORMAL
BILIRUB UR QL STRIP: NEGATIVE
BLD GP AB SCN CELLS X3 SERPL QL: NORMAL
BLD PROD TYP BPU: NORMAL
BLD PROD TYP BPU: NORMAL
BLOOD UNIT EXPIRATION DATE: NORMAL
BLOOD UNIT EXPIRATION DATE: NORMAL
BLOOD UNIT TYPE CODE: 5100
BLOOD UNIT TYPE CODE: 7300
BLOOD UNIT TYPE: NORMAL
BLOOD UNIT TYPE: NORMAL
BNP SERPL-MCNC: 1303 PG/ML (ref 0–99)
BNP SERPL-MCNC: 2058 PG/ML (ref 0–99)
BNP SERPL-MCNC: 2332 PG/ML (ref 0–99)
BNP SERPL-MCNC: 2515 PG/ML (ref 0–99)
BNP SERPL-MCNC: 2540 PG/ML (ref 0–99)
BNP SERPL-MCNC: 285 PG/ML (ref 0–99)
BRPFT: ABNORMAL
BSA FOR ECHO PROCEDURE: 1.8 M2
BUN SERPL-MCNC: 13 MG/DL (ref 6–20)
BUN SERPL-MCNC: 15 MG/DL (ref 6–20)
BUN SERPL-MCNC: 15 MG/DL (ref 6–20)
BUN SERPL-MCNC: 17 MG/DL (ref 6–20)
BUN SERPL-MCNC: 17 MG/DL (ref 6–20)
BUN SERPL-MCNC: 18 MG/DL (ref 6–20)
BUN SERPL-MCNC: 18 MG/DL (ref 6–20)
BUN SERPL-MCNC: 27 MG/DL (ref 6–20)
BUN SERPL-MCNC: 31 MG/DL (ref 6–20)
BUN SERPL-MCNC: 35 MG/DL (ref 6–20)
BUN SERPL-MCNC: 8 MG/DL (ref 6–20)
BZE UR QL SCN: NORMAL
CALCIUM SERPL-MCNC: 8.2 MG/DL (ref 8.7–10.5)
CALCIUM SERPL-MCNC: 8.4 MG/DL (ref 8.7–10.5)
CALCIUM SERPL-MCNC: 8.6 MG/DL (ref 8.7–10.5)
CALCIUM SERPL-MCNC: 8.6 MG/DL (ref 8.7–10.5)
CALCIUM SERPL-MCNC: 8.7 MG/DL (ref 8.7–10.5)
CALCIUM SERPL-MCNC: 8.8 MG/DL (ref 8.7–10.5)
CALCIUM SERPL-MCNC: 8.9 MG/DL (ref 8.7–10.5)
CALCIUM SERPL-MCNC: 9 MG/DL (ref 8.7–10.5)
CALCIUM SERPL-MCNC: 9 MG/DL (ref 8.7–10.5)
CALCIUM SERPL-MCNC: 9.1 MG/DL (ref 8.7–10.5)
CALCIUM SERPL-MCNC: 9.3 MG/DL (ref 8.7–10.5)
CANNABINOIDS UR QL SCN: NEGATIVE
CHLORIDE SERPL-SCNC: 103 MMOL/L (ref 95–110)
CHLORIDE SERPL-SCNC: 104 MMOL/L (ref 95–110)
CHLORIDE SERPL-SCNC: 105 MMOL/L (ref 95–110)
CHLORIDE SERPL-SCNC: 92 MMOL/L (ref 95–110)
CHLORIDE SERPL-SCNC: 92 MMOL/L (ref 95–110)
CHLORIDE SERPL-SCNC: 94 MMOL/L (ref 95–110)
CHLORIDE SERPL-SCNC: 95 MMOL/L (ref 95–110)
CHLORIDE SERPL-SCNC: 96 MMOL/L (ref 95–110)
CHLORIDE SERPL-SCNC: 97 MMOL/L (ref 95–110)
CHLORIDE SERPL-SCNC: 98 MMOL/L (ref 95–110)
CHLORIDE SERPL-SCNC: 99 MMOL/L (ref 95–110)
CK SERPL-CCNC: 332 U/L (ref 20–200)
CLARITY UR: CLEAR
CO2 SERPL-SCNC: 17 MMOL/L (ref 23–29)
CO2 SERPL-SCNC: 21 MMOL/L (ref 23–29)
CO2 SERPL-SCNC: 21 MMOL/L (ref 23–29)
CO2 SERPL-SCNC: 22 MMOL/L (ref 23–29)
CO2 SERPL-SCNC: 22 MMOL/L (ref 23–29)
CO2 SERPL-SCNC: 23 MMOL/L (ref 23–29)
CO2 SERPL-SCNC: 24 MMOL/L (ref 23–29)
CO2 SERPL-SCNC: 25 MMOL/L (ref 23–29)
CO2 SERPL-SCNC: 28 MMOL/L (ref 23–29)
CODING SYSTEM: NORMAL
CODING SYSTEM: NORMAL
COLOR UR: ABNORMAL
COLOR UR: YELLOW
CREAT SERPL-MCNC: 1 MG/DL (ref 0.5–1.4)
CREAT SERPL-MCNC: 1.1 MG/DL (ref 0.5–1.4)
CREAT SERPL-MCNC: 1.2 MG/DL (ref 0.5–1.4)
CREAT SERPL-MCNC: 1.3 MG/DL (ref 0.5–1.4)
CREAT SERPL-MCNC: 1.3 MG/DL (ref 0.5–1.4)
CREAT SERPL-MCNC: 1.4 MG/DL (ref 0.5–1.4)
CREAT SERPL-MCNC: 1.4 MG/DL (ref 0.5–1.4)
CREAT SERPL-MCNC: 1.6 MG/DL (ref 0.5–1.4)
CREAT SERPL-MCNC: 1.6 MG/DL (ref 0.5–1.4)
CREAT UR-MCNC: 33 MG/DL (ref 23–375)
CREAT UR-MCNC: 33 MG/DL (ref 23–375)
CRP SERPL-MCNC: 0.3 MG/L (ref 0–8.2)
CTP QC/QA: YES
CV ECHO LV RWT: 0.22 CM
D DIMER PPP IA.FEU-MCNC: 2.69 MG/L FEU
DELSYS: ABNORMAL
DELSYS: ABNORMAL
DIFFERENTIAL METHOD: ABNORMAL
DISPENSE STATUS: NORMAL
DISPENSE STATUS: NORMAL
DLCO ADJ PRE: 14.42 ML/(MIN*MMHG) (ref 26–39.86)
DLCO SINGLE BREATH LLN: 26
DLCO SINGLE BREATH PRE REF: 43.8 %
DLCO SINGLE BREATH REF: 32.93
DLCOC SBVA LLN: 3.09
DLCOC SBVA PRE REF: 76.7 %
DLCOC SBVA REF: 4.15
DLCOC SINGLE BREATH LLN: 26
DLCOC SINGLE BREATH PRE REF: 43.8 %
DLCOC SINGLE BREATH REF: 32.93
DLCOVA LLN: 3.09
DLCOVA PRE REF: 76.7 %
DLCOVA PRE: 3.18 ML/(MIN*MMHG*L) (ref 3.09–5.21)
DLCOVA REF: 4.15
DLVAADJ PRE: 3.18 ML/(MIN*MMHG*L) (ref 3.09–5.21)
DOP CALC AO PEAK VEL: 1.58 M/S
DOP CALC AO VTI: 24.82 CM
DOP CALC LVOT AREA: 4.5 CM2
DOP CALC LVOT DIAMETER: 2.4 CM
DOP CALC LVOT PEAK VEL: 0.68 M/S
DOP CALC LVOT STROKE VOLUME: 56.16 CM3
DOP CALCLVOT PEAK VEL VTI: 12.42 CM
E WAVE DECELERATION TIME: 154.64 MSEC
E/A RATIO: 2.29
ECHO LV POSTERIOR WALL: 0.68 CM (ref 0.6–1.1)
EOSINOPHIL # BLD AUTO: 0 K/UL (ref 0–0.5)
EOSINOPHIL # BLD AUTO: 0.1 K/UL (ref 0–0.5)
EOSINOPHIL # BLD AUTO: 0.2 K/UL (ref 0–0.5)
EOSINOPHIL # BLD AUTO: 0.2 K/UL (ref 0–0.5)
EOSINOPHIL NFR BLD: 0 % (ref 0–8)
EOSINOPHIL NFR BLD: 0.1 % (ref 0–8)
EOSINOPHIL NFR BLD: 0.2 % (ref 0–8)
EOSINOPHIL NFR BLD: 0.4 % (ref 0–8)
EOSINOPHIL NFR BLD: 1 % (ref 0–8)
EOSINOPHIL NFR BLD: 1.3 % (ref 0–8)
EOSINOPHIL NFR BLD: 2 % (ref 0–8)
EOSINOPHIL NFR BLD: 2.7 % (ref 0–8)
EOSINOPHIL NFR BLD: 3.4 % (ref 0–8)
EOSINOPHIL NFR BLD: 4.1 % (ref 0–8)
ERV LLN: -16448.68
ERV PRE REF: 60.5 %
ERV REF: 1.32
ERYTHROCYTE [DISTWIDTH] IN BLOOD BY AUTOMATED COUNT: 12 % (ref 11.5–14.5)
ERYTHROCYTE [DISTWIDTH] IN BLOOD BY AUTOMATED COUNT: 12.3 % (ref 11.5–14.5)
ERYTHROCYTE [DISTWIDTH] IN BLOOD BY AUTOMATED COUNT: 12.4 % (ref 11.5–14.5)
ERYTHROCYTE [DISTWIDTH] IN BLOOD BY AUTOMATED COUNT: 12.5 % (ref 11.5–14.5)
ERYTHROCYTE [DISTWIDTH] IN BLOOD BY AUTOMATED COUNT: 12.6 % (ref 11.5–14.5)
ERYTHROCYTE [DISTWIDTH] IN BLOOD BY AUTOMATED COUNT: 12.8 % (ref 11.5–14.5)
ERYTHROCYTE [DISTWIDTH] IN BLOOD BY AUTOMATED COUNT: 13.8 % (ref 11.5–14.5)
ERYTHROCYTE [DISTWIDTH] IN BLOOD BY AUTOMATED COUNT: 21.6 % (ref 11.5–14.5)
ERYTHROCYTE [DISTWIDTH] IN BLOOD BY AUTOMATED COUNT: 24.5 % (ref 11.5–14.5)
ERYTHROCYTE [DISTWIDTH] IN BLOOD BY AUTOMATED COUNT: 24.6 % (ref 11.5–14.5)
ERYTHROCYTE [DISTWIDTH] IN BLOOD BY AUTOMATED COUNT: 25.1 % (ref 11.5–14.5)
ERYTHROCYTE [DISTWIDTH] IN BLOOD BY AUTOMATED COUNT: 26.6 % (ref 11.5–14.5)
ERYTHROCYTE [SEDIMENTATION RATE] IN BLOOD BY WESTERGREN METHOD: 16 MM/H
EST. GFR  (AFRICAN AMERICAN): 54 ML/MIN/1.73 M^2
EST. GFR  (AFRICAN AMERICAN): 54 ML/MIN/1.73 M^2
EST. GFR  (AFRICAN AMERICAN): >60 ML/MIN/1.73 M^2
EST. GFR  (NON AFRICAN AMERICAN): 47 ML/MIN/1.73 M^2
EST. GFR  (NON AFRICAN AMERICAN): 47 ML/MIN/1.73 M^2
EST. GFR  (NON AFRICAN AMERICAN): 55 ML/MIN/1.73 M^2
EST. GFR  (NON AFRICAN AMERICAN): 55 ML/MIN/1.73 M^2
EST. GFR  (NON AFRICAN AMERICAN): 60 ML/MIN/1.73 M^2
EST. GFR  (NON AFRICAN AMERICAN): >60 ML/MIN/1.73 M^2
ETHANOL UR-MCNC: <10 MG/DL
FEF 25 75 LLN: 1.25
FEF 25 75 PRE REF: 48.2 %
FEF 25 75 REF: 2.92
FERRITIN SERPL-MCNC: 80 NG/ML (ref 20–300)
FEV1 FVC LLN: 66
FEV1 FVC PRE REF: 90 %
FEV1 FVC REF: 78
FEV1 LLN: 2.55
FEV1 PRE REF: 64.8 %
FEV1 REF: 3.5
FIO2: 100
FRACTIONAL SHORTENING: 24 % (ref 28–44)
FRCPLETH LLN: 2.84
FRCPLETH PREREF: 75.2 %
FRCPLETH REF: 3.83
FVC LLN: 3.38
FVC PRE REF: 71.8 %
FVC REF: 4.53
GIANT PLATELETS BLD QL SMEAR: PRESENT
GLUCOSE SERPL-MCNC: 109 MG/DL (ref 70–110)
GLUCOSE SERPL-MCNC: 115 MG/DL (ref 70–110)
GLUCOSE SERPL-MCNC: 154 MG/DL (ref 70–110)
GLUCOSE SERPL-MCNC: 168 MG/DL (ref 70–110)
GLUCOSE SERPL-MCNC: 76 MG/DL (ref 70–110)
GLUCOSE SERPL-MCNC: 80 MG/DL (ref 70–110)
GLUCOSE SERPL-MCNC: 85 MG/DL (ref 70–110)
GLUCOSE SERPL-MCNC: 88 MG/DL (ref 70–110)
GLUCOSE SERPL-MCNC: 93 MG/DL (ref 70–110)
GLUCOSE SERPL-MCNC: 98 MG/DL (ref 70–110)
GLUCOSE SERPL-MCNC: 99 MG/DL (ref 70–110)
GLUCOSE UR QL STRIP: NEGATIVE
GRAN CASTS #/AREA URNS LPF: 2 /LPF
HCO3 UR-SCNC: 18.4 MMOL/L (ref 24–28)
HCO3 UR-SCNC: 27.7 MMOL/L (ref 24–28)
HCT VFR BLD AUTO: 20.3 % (ref 40–54)
HCT VFR BLD AUTO: 20.4 % (ref 40–54)
HCT VFR BLD AUTO: 21.8 % (ref 40–54)
HCT VFR BLD AUTO: 22.5 % (ref 40–54)
HCT VFR BLD AUTO: 23.5 % (ref 40–54)
HCT VFR BLD AUTO: 35.3 % (ref 40–54)
HCT VFR BLD AUTO: 38.6 % (ref 40–54)
HCT VFR BLD AUTO: 40 % (ref 40–54)
HCT VFR BLD AUTO: 40.5 % (ref 40–54)
HCT VFR BLD AUTO: 42.6 % (ref 40–54)
HCT VFR BLD AUTO: 42.7 % (ref 40–54)
HCT VFR BLD AUTO: 43.9 % (ref 40–54)
HGB BLD-MCNC: 11.8 G/DL (ref 14–18)
HGB BLD-MCNC: 12.6 G/DL (ref 14–18)
HGB BLD-MCNC: 13.3 G/DL (ref 14–18)
HGB BLD-MCNC: 13.5 G/DL (ref 14–18)
HGB BLD-MCNC: 13.8 G/DL (ref 14–18)
HGB BLD-MCNC: 13.8 G/DL (ref 14–18)
HGB BLD-MCNC: 13.9 G/DL (ref 14–18)
HGB BLD-MCNC: 6.3 G/DL (ref 14–18)
HGB BLD-MCNC: 6.6 G/DL (ref 14–18)
HGB BLD-MCNC: 7 G/DL (ref 14–18)
HGB BLD-MCNC: 7.4 G/DL (ref 14–18)
HGB BLD-MCNC: 7.7 G/DL (ref 14–18)
HGB UR QL STRIP: ABNORMAL
HGB UR QL STRIP: NEGATIVE
HYALINE CASTS #/AREA URNS LPF: 1 /LPF
HYALINE CASTS #/AREA URNS LPF: 2 /LPF
HYALINE CASTS #/AREA URNS LPF: 2 /LPF
HYPOCHROMIA BLD QL SMEAR: ABNORMAL
HYPOCHROMIA BLD QL SMEAR: ABNORMAL
IMM GRANULOCYTES # BLD AUTO: 0.01 K/UL (ref 0–0.04)
IMM GRANULOCYTES # BLD AUTO: 0.01 K/UL (ref 0–0.04)
IMM GRANULOCYTES # BLD AUTO: 0.02 K/UL (ref 0–0.04)
IMM GRANULOCYTES # BLD AUTO: 0.03 K/UL (ref 0–0.04)
IMM GRANULOCYTES # BLD AUTO: 0.03 K/UL (ref 0–0.04)
IMM GRANULOCYTES # BLD AUTO: 0.06 K/UL (ref 0–0.04)
IMM GRANULOCYTES NFR BLD AUTO: 0.2 % (ref 0–0.5)
IMM GRANULOCYTES NFR BLD AUTO: 0.2 % (ref 0–0.5)
IMM GRANULOCYTES NFR BLD AUTO: 0.3 % (ref 0–0.5)
IMM GRANULOCYTES NFR BLD AUTO: 0.3 % (ref 0–0.5)
IMM GRANULOCYTES NFR BLD AUTO: 0.4 % (ref 0–0.5)
IMM GRANULOCYTES NFR BLD AUTO: 0.5 % (ref 0–0.5)
IMM GRANULOCYTES NFR BLD AUTO: 0.9 % (ref 0–0.5)
INR PPP: 1.1 (ref 0.8–1.2)
INR PPP: 1.1 (ref 0.8–1.2)
INR PPP: 1.7 (ref 0.8–1.2)
INTERVENTRICULAR SEPTUM: 0.92 CM (ref 0.6–1.1)
IVC PRE: 3.19 L (ref 3.38–5.68)
IVC SINGLE BREATH LLN: 3.38
IVC SINGLE BREATH PRE REF: 70.5 %
IVC SINGLE BREATH REF: 4.53
KETONES UR QL STRIP: NEGATIVE
LA MAJOR: 6.64 CM
LA MINOR: 6.83 CM
LA WIDTH: 6.32 CM
LACTATE SERPL-SCNC: 1.1 MMOL/L (ref 0.5–2.2)
LACTATE SERPL-SCNC: 1.3 MMOL/L (ref 0.5–2.2)
LACTATE SERPL-SCNC: 2.6 MMOL/L (ref 0.5–2.2)
LDH SERPL L TO P-CCNC: 278 U/L (ref 110–260)
LEFT ATRIUM SIZE: 4.65 CM
LEFT ATRIUM VOLUME INDEX: 91.1 ML/M2
LEFT ATRIUM VOLUME: 168.21 CM3
LEFT INTERNAL DIMENSION IN SYSTOLE: 4.64 CM (ref 2.1–4)
LEFT VENTRICLE DIASTOLIC VOLUME INDEX: 100.11 ML/M2
LEFT VENTRICLE DIASTOLIC VOLUME: 184.9 ML
LEFT VENTRICLE MASS INDEX: 103 G/M2
LEFT VENTRICLE SYSTOLIC VOLUME INDEX: 53.8 ML/M2
LEFT VENTRICLE SYSTOLIC VOLUME: 99.29 ML
LEFT VENTRICULAR INTERNAL DIMENSION IN DIASTOLE: 6.07 CM (ref 3.5–6)
LEFT VENTRICULAR MASS: 189.94 G
LEUKOCYTE ESTERASE UR QL STRIP: NEGATIVE
LIPASE SERPL-CCNC: 21 U/L (ref 4–60)
LIPASE SERPL-CCNC: 36 U/L (ref 4–60)
LV LATERAL E/E' RATIO: 29.71 M/S
LYMPHOCYTES # BLD AUTO: 0.4 K/UL (ref 1–4.8)
LYMPHOCYTES # BLD AUTO: 1 K/UL (ref 1–4.8)
LYMPHOCYTES # BLD AUTO: 1.1 K/UL (ref 1–4.8)
LYMPHOCYTES # BLD AUTO: 1.3 K/UL (ref 1–4.8)
LYMPHOCYTES # BLD AUTO: 1.4 K/UL (ref 1–4.8)
LYMPHOCYTES # BLD AUTO: 1.6 K/UL (ref 1–4.8)
LYMPHOCYTES NFR BLD: 15.2 % (ref 18–48)
LYMPHOCYTES NFR BLD: 16.1 % (ref 18–48)
LYMPHOCYTES NFR BLD: 19.4 % (ref 18–48)
LYMPHOCYTES NFR BLD: 21.2 % (ref 18–48)
LYMPHOCYTES NFR BLD: 23.9 % (ref 18–48)
LYMPHOCYTES NFR BLD: 26.2 % (ref 18–48)
LYMPHOCYTES NFR BLD: 28.7 % (ref 18–48)
LYMPHOCYTES NFR BLD: 34.8 % (ref 18–48)
LYMPHOCYTES NFR BLD: 39.1 % (ref 18–48)
LYMPHOCYTES NFR BLD: 4.1 % (ref 18–48)
MAGNESIUM SERPL-MCNC: 1.8 MG/DL (ref 1.6–2.6)
MAGNESIUM SERPL-MCNC: 2.1 MG/DL (ref 1.6–2.6)
MAGNESIUM SERPL-MCNC: 2.3 MG/DL (ref 1.6–2.6)
MCH RBC QN AUTO: 28.3 PG (ref 27–31)
MCH RBC QN AUTO: 28.5 PG (ref 27–31)
MCH RBC QN AUTO: 28.6 PG (ref 27–31)
MCH RBC QN AUTO: 28.9 PG (ref 27–31)
MCH RBC QN AUTO: 29.1 PG (ref 27–31)
MCH RBC QN AUTO: 29.6 PG (ref 27–31)
MCH RBC QN AUTO: 29.7 PG (ref 27–31)
MCH RBC QN AUTO: 29.8 PG (ref 27–31)
MCH RBC QN AUTO: 29.9 PG (ref 27–31)
MCH RBC QN AUTO: 30.6 PG (ref 27–31)
MCHC RBC AUTO-ENTMCNC: 31 G/DL (ref 32–36)
MCHC RBC AUTO-ENTMCNC: 31.7 G/DL (ref 32–36)
MCHC RBC AUTO-ENTMCNC: 32.1 G/DL (ref 32–36)
MCHC RBC AUTO-ENTMCNC: 32.3 G/DL (ref 32–36)
MCHC RBC AUTO-ENTMCNC: 32.4 G/DL (ref 32–36)
MCHC RBC AUTO-ENTMCNC: 32.4 G/DL (ref 32–36)
MCHC RBC AUTO-ENTMCNC: 32.6 G/DL (ref 32–36)
MCHC RBC AUTO-ENTMCNC: 32.8 G/DL (ref 32–36)
MCHC RBC AUTO-ENTMCNC: 32.8 G/DL (ref 32–36)
MCHC RBC AUTO-ENTMCNC: 32.9 G/DL (ref 32–36)
MCHC RBC AUTO-ENTMCNC: 33.4 G/DL (ref 32–36)
MCHC RBC AUTO-ENTMCNC: 33.8 G/DL (ref 32–36)
MCV RBC AUTO: 85 FL (ref 82–98)
MCV RBC AUTO: 88 FL (ref 82–98)
MCV RBC AUTO: 89 FL (ref 82–98)
MCV RBC AUTO: 90 FL (ref 82–98)
MCV RBC AUTO: 91 FL (ref 82–98)
MCV RBC AUTO: 92 FL (ref 82–98)
MCV RBC AUTO: 99 FL (ref 82–98)
METHADONE UR QL SCN>300 NG/ML: NEGATIVE
MICROSCOPIC COMMENT: ABNORMAL
MODE: ABNORMAL
MONOCYTES # BLD AUTO: 0.4 K/UL (ref 0.3–1)
MONOCYTES # BLD AUTO: 0.4 K/UL (ref 0.3–1)
MONOCYTES # BLD AUTO: 0.5 K/UL (ref 0.3–1)
MONOCYTES # BLD AUTO: 0.5 K/UL (ref 0.3–1)
MONOCYTES # BLD AUTO: 0.6 K/UL (ref 0.3–1)
MONOCYTES # BLD AUTO: 0.7 K/UL (ref 0.3–1)
MONOCYTES # BLD AUTO: 0.8 K/UL (ref 0.3–1)
MONOCYTES NFR BLD: 10.2 % (ref 4–15)
MONOCYTES NFR BLD: 10.3 % (ref 4–15)
MONOCYTES NFR BLD: 10.3 % (ref 4–15)
MONOCYTES NFR BLD: 10.5 % (ref 4–15)
MONOCYTES NFR BLD: 10.7 % (ref 4–15)
MONOCYTES NFR BLD: 11.3 % (ref 4–15)
MONOCYTES NFR BLD: 12.2 % (ref 4–15)
MONOCYTES NFR BLD: 12.3 % (ref 4–15)
MONOCYTES NFR BLD: 5.7 % (ref 4–15)
MONOCYTES NFR BLD: 6.9 % (ref 4–15)
MV PEAK A VEL: 0.91 M/S
MV PEAK E VEL: 2.08 M/S
MV STENOSIS PRESSURE HALF TIME: 44.85 MS
MV VALVE AREA P 1/2 METHOD: 4.91 CM2
NEUTROPHILS # BLD AUTO: 1.9 K/UL (ref 1.8–7.7)
NEUTROPHILS # BLD AUTO: 2.5 K/UL (ref 1.8–7.7)
NEUTROPHILS # BLD AUTO: 3 K/UL (ref 1.8–7.7)
NEUTROPHILS # BLD AUTO: 3.2 K/UL (ref 1.8–7.7)
NEUTROPHILS # BLD AUTO: 3.3 K/UL (ref 1.8–7.7)
NEUTROPHILS # BLD AUTO: 3.8 K/UL (ref 1.8–7.7)
NEUTROPHILS # BLD AUTO: 3.9 K/UL (ref 1.8–7.7)
NEUTROPHILS # BLD AUTO: 4.5 K/UL (ref 1.8–7.7)
NEUTROPHILS # BLD AUTO: 4.7 K/UL (ref 1.8–7.7)
NEUTROPHILS # BLD AUTO: 9.4 K/UL (ref 1.8–7.7)
NEUTROPHILS NFR BLD: 48.1 % (ref 38–73)
NEUTROPHILS NFR BLD: 52.4 % (ref 38–73)
NEUTROPHILS NFR BLD: 59 % (ref 38–73)
NEUTROPHILS NFR BLD: 62.5 % (ref 38–73)
NEUTROPHILS NFR BLD: 63.1 % (ref 38–73)
NEUTROPHILS NFR BLD: 65.2 % (ref 38–73)
NEUTROPHILS NFR BLD: 68.6 % (ref 38–73)
NEUTROPHILS NFR BLD: 68.8 % (ref 38–73)
NEUTROPHILS NFR BLD: 69.3 % (ref 38–73)
NEUTROPHILS NFR BLD: 89.2 % (ref 38–73)
NITRITE UR QL STRIP: NEGATIVE
NRBC BLD-RTO: 0 /100 WBC
NUM UNITS TRANS PACKED RBC: NORMAL
NUM UNITS TRANS PACKED RBC: NORMAL
OB PNL STL: NEGATIVE
OPIATES UR QL SCN: NEGATIVE
OSMOLALITY UR: 277 MOSM/KG (ref 50–1200)
OVALOCYTES BLD QL SMEAR: ABNORMAL
PCO2 BLDA: 25.9 MMHG (ref 35–45)
PCO2 BLDA: 32.4 MMHG (ref 35–45)
PCP UR QL SCN>25 NG/ML: NEGATIVE
PEEP: 5
PEF LLN: 6.55
PEF PRE REF: 60.6 %
PEF REF: 9.52
PH SMN: 7.46 [PH] (ref 7.35–7.45)
PH SMN: 7.54 [PH] (ref 7.35–7.45)
PH UR STRIP: 6 [PH] (ref 5–8)
PH UR STRIP: 6 [PH] (ref 5–8)
PH UR STRIP: 7 [PH] (ref 5–8)
PH UR STRIP: 7 [PH] (ref 5–8)
PHOSPHATE SERPL-MCNC: 3.6 MG/DL (ref 2.7–4.5)
PHOSPHATE SERPL-MCNC: 5.8 MG/DL (ref 2.7–4.5)
PISA MRMAX VEL: 0.04 M/S
PISA TR MAX VEL: 3.6 M/S
PLATELET # BLD AUTO: 153 K/UL (ref 150–350)
PLATELET # BLD AUTO: 182 K/UL (ref 150–350)
PLATELET # BLD AUTO: 185 K/UL (ref 150–350)
PLATELET # BLD AUTO: 199 K/UL (ref 150–350)
PLATELET # BLD AUTO: 202 K/UL (ref 150–350)
PLATELET # BLD AUTO: 207 K/UL (ref 150–350)
PLATELET # BLD AUTO: 209 K/UL (ref 150–350)
PLATELET # BLD AUTO: 240 K/UL (ref 150–350)
PLATELET # BLD AUTO: 243 K/UL (ref 150–350)
PLATELET # BLD AUTO: 271 K/UL (ref 150–350)
PLATELET # BLD AUTO: 285 K/UL (ref 150–350)
PLATELET # BLD AUTO: 320 K/UL (ref 150–350)
PLATELET BLD QL SMEAR: ABNORMAL
PMV BLD AUTO: 10 FL (ref 9.2–12.9)
PMV BLD AUTO: 10.1 FL (ref 9.2–12.9)
PMV BLD AUTO: 10.7 FL (ref 9.2–12.9)
PMV BLD AUTO: 10.9 FL (ref 9.2–12.9)
PMV BLD AUTO: 11 FL (ref 9.2–12.9)
PMV BLD AUTO: 11.4 FL (ref 9.2–12.9)
PMV BLD AUTO: 11.7 FL (ref 9.2–12.9)
PMV BLD AUTO: 11.8 FL (ref 9.2–12.9)
PMV BLD AUTO: 9.1 FL (ref 9.2–12.9)
PMV BLD AUTO: 9.6 FL (ref 9.2–12.9)
PMV BLD AUTO: 9.8 FL (ref 9.2–12.9)
PMV BLD AUTO: ABNORMAL FL (ref 9.2–12.9)
PO2 BLDA: 307 MMHG (ref 80–100)
PO2 BLDA: 97 MMHG (ref 80–100)
POC BE: -5 MMOL/L
POC BE: 5 MMOL/L
POC SATURATED O2: 100 % (ref 95–100)
POC SATURATED O2: 98 % (ref 95–100)
POC TCO2: 19 MMOL/L (ref 23–27)
POC TCO2: 29 MMOL/L (ref 23–27)
POIKILOCYTOSIS BLD QL SMEAR: ABNORMAL
POIKILOCYTOSIS BLD QL SMEAR: SLIGHT
POLYCHROMASIA BLD QL SMEAR: ABNORMAL
POTASSIUM SERPL-SCNC: 3.2 MMOL/L (ref 3.5–5.1)
POTASSIUM SERPL-SCNC: 3.5 MMOL/L (ref 3.5–5.1)
POTASSIUM SERPL-SCNC: 3.7 MMOL/L (ref 3.5–5.1)
POTASSIUM SERPL-SCNC: 3.8 MMOL/L (ref 3.5–5.1)
POTASSIUM SERPL-SCNC: 4.3 MMOL/L (ref 3.5–5.1)
POTASSIUM SERPL-SCNC: 4.3 MMOL/L (ref 3.5–5.1)
POTASSIUM SERPL-SCNC: 4.6 MMOL/L (ref 3.5–5.1)
POTASSIUM SERPL-SCNC: 4.8 MMOL/L (ref 3.5–5.1)
POTASSIUM SERPL-SCNC: 4.8 MMOL/L (ref 3.5–5.1)
PRE DLCO: 14.42 ML/(MIN*MMHG) (ref 26–39.86)
PRE ERV: 0.8 L (ref -16448.68–16451.32)
PRE FEF 25 75: 1.41 L/S (ref 1.25–4.6)
PRE FET 100: 7.67 SEC
PRE FEV1 FVC: 69.85 % (ref 66.21–88.95)
PRE FEV1: 2.27 L (ref 2.55–4.45)
PRE FRC PL: 2.88 L
PRE FVC: 3.25 L (ref 3.38–5.68)
PRE PEF: 5.77 L/S (ref 6.55–12.49)
PRE RV: 1.86 L (ref 1.83–3.18)
PRE TLC: 4.79 L (ref 6.79–9.09)
PROCALCITONIN SERPL IA-MCNC: 0.03 NG/ML
PROCALCITONIN SERPL IA-MCNC: 0.05 NG/ML
PROCALCITONIN SERPL IA-MCNC: 0.2 NG/ML
PROT SERPL-MCNC: 6.8 G/DL (ref 6–8.4)
PROT SERPL-MCNC: 7.4 G/DL (ref 6–8.4)
PROT SERPL-MCNC: 7.4 G/DL (ref 6–8.4)
PROT SERPL-MCNC: 7.5 G/DL (ref 6–8.4)
PROT SERPL-MCNC: 7.5 G/DL (ref 6–8.4)
PROT SERPL-MCNC: 7.6 G/DL (ref 6–8.4)
PROT SERPL-MCNC: 8.2 G/DL (ref 6–8.4)
PROT SERPL-MCNC: 8.3 G/DL (ref 6–8.4)
PROT UR QL STRIP: ABNORMAL
PROT UR QL STRIP: NEGATIVE
PROTHROMBIN TIME: 11.4 SEC (ref 9–12.5)
PROTHROMBIN TIME: 11.6 SEC (ref 9–12.5)
PROTHROMBIN TIME: 17.8 SEC (ref 9–12.5)
PV PEAK VELOCITY: 0.77 CM/S
RA MAJOR: 6.15 CM
RA PRESSURE: 3 MMHG
RA WIDTH: 4.47 CM
RAW LLN: 3.06
RAW PRE REF: 80.2 %
RAW PRE: 2.45 CMH2O*S/L (ref 3.06–3.06)
RAW REF: 3.06
RBC # BLD AUTO: 2.06 M/UL (ref 4.6–6.2)
RBC # BLD AUTO: 2.22 M/UL (ref 4.6–6.2)
RBC # BLD AUTO: 2.42 M/UL (ref 4.6–6.2)
RBC # BLD AUTO: 2.5 M/UL (ref 4.6–6.2)
RBC # BLD AUTO: 2.58 M/UL (ref 4.6–6.2)
RBC # BLD AUTO: 3.94 M/UL (ref 4.6–6.2)
RBC # BLD AUTO: 4.33 M/UL (ref 4.6–6.2)
RBC # BLD AUTO: 4.61 M/UL (ref 4.6–6.2)
RBC # BLD AUTO: 4.73 M/UL (ref 4.6–6.2)
RBC # BLD AUTO: 4.77 M/UL (ref 4.6–6.2)
RBC # BLD AUTO: 4.82 M/UL (ref 4.6–6.2)
RBC # BLD AUTO: 4.91 M/UL (ref 4.6–6.2)
RBC #/AREA URNS HPF: 0 /HPF (ref 0–4)
RBC #/AREA URNS HPF: 1 /HPF (ref 0–4)
RBC #/AREA URNS HPF: >100 /HPF (ref 0–4)
RIGHT VENTRICULAR END-DIASTOLIC DIMENSION: 2.92 CM
RV LLN: 1.83
RV PRE REF: 74.1 %
RV REF: 2.51
RVTLC LLN: 28
RVTLC PRE REF: 106.2 %
RVTLC PRE: 38.83 % (ref 27.6–45.56)
RVTLC REF: 37
SAMPLE: ABNORMAL
SAMPLE: ABNORMAL
SARS-COV-2 RDRP RESP QL NAA+PROBE: NEGATIVE
SCHISTOCYTES BLD QL SMEAR: ABNORMAL
SITE: ABNORMAL
SITE: ABNORMAL
SODIUM SERPL-SCNC: 126 MMOL/L (ref 136–145)
SODIUM SERPL-SCNC: 127 MMOL/L (ref 136–145)
SODIUM SERPL-SCNC: 130 MMOL/L (ref 136–145)
SODIUM SERPL-SCNC: 131 MMOL/L (ref 136–145)
SODIUM SERPL-SCNC: 133 MMOL/L (ref 136–145)
SODIUM SERPL-SCNC: 136 MMOL/L (ref 136–145)
SODIUM SERPL-SCNC: 136 MMOL/L (ref 136–145)
SODIUM SERPL-SCNC: 137 MMOL/L (ref 136–145)
SODIUM SERPL-SCNC: 137 MMOL/L (ref 136–145)
SODIUM SERPL-SCNC: 138 MMOL/L (ref 136–145)
SODIUM SERPL-SCNC: 139 MMOL/L (ref 136–145)
SODIUM UR-SCNC: 48 MMOL/L (ref 20–250)
SP GR UR STRIP: 1.01 (ref 1–1.03)
SP GR UR STRIP: 1.01 (ref 1–1.03)
SP GR UR STRIP: 1.02 (ref 1–1.03)
SP GR UR STRIP: 1.02 (ref 1–1.03)
SQUAMOUS #/AREA URNS HPF: 16 /HPF
SQUAMOUS #/AREA URNS HPF: 3 /HPF
STJ: 2.87 CM
TDI LATERAL: 0.07 M/S
TLC LLN: 6.79
TLC PRE REF: 60.3 %
TLC REF: 7.94
TOXICOLOGY INFORMATION: NORMAL
TR MAX PG: 52 MMHG
TROPONIN I SERPL DL<=0.01 NG/ML-MCNC: 0.04 NG/ML (ref 0–0.03)
TROPONIN I SERPL DL<=0.01 NG/ML-MCNC: 0.05 NG/ML (ref 0–0.03)
TROPONIN I SERPL DL<=0.01 NG/ML-MCNC: 0.06 NG/ML (ref 0–0.03)
TROPONIN I SERPL DL<=0.01 NG/ML-MCNC: 0.08 NG/ML (ref 0–0.03)
TROPONIN I SERPL DL<=0.01 NG/ML-MCNC: 0.09 NG/ML (ref 0–0.03)
TROPONIN I SERPL DL<=0.01 NG/ML-MCNC: 0.1 NG/ML (ref 0–0.03)
TV REST PULMONARY ARTERY PRESSURE: 55 MMHG
URN SPEC COLLECT METH UR: ABNORMAL
URN SPEC COLLECT METH UR: NORMAL
UROBILINOGEN UR STRIP-ACNC: 1 EU/DL
UROBILINOGEN UR STRIP-ACNC: 1 EU/DL
UROBILINOGEN UR STRIP-ACNC: ABNORMAL EU/DL
UROBILINOGEN UR STRIP-ACNC: NEGATIVE EU/DL
VA PRE: 4.53 L (ref 7.79–7.79)
VA SINGLE BREATH LLN: 7.79
VA SINGLE BREATH PRE REF: 58.2 %
VA SINGLE BREATH REF: 7.79
VC LLN: 3.38
VC PRE REF: 64.7 %
VC PRE: 2.93 L (ref 3.38–5.68)
VC REF: 4.53
VT: 450
VTGRAWPRE: 2.88 L
WBC # BLD AUTO: 10.56 K/UL (ref 3.9–12.7)
WBC # BLD AUTO: 3.99 K/UL (ref 3.9–12.7)
WBC # BLD AUTO: 4.44 K/UL (ref 3.9–12.7)
WBC # BLD AUTO: 4.68 K/UL (ref 3.9–12.7)
WBC # BLD AUTO: 4.85 K/UL (ref 3.9–12.7)
WBC # BLD AUTO: 5.18 K/UL (ref 3.9–12.7)
WBC # BLD AUTO: 5.41 K/UL (ref 3.9–12.7)
WBC # BLD AUTO: 5.66 K/UL (ref 3.9–12.7)
WBC # BLD AUTO: 5.81 K/UL (ref 3.9–12.7)
WBC # BLD AUTO: 5.81 K/UL (ref 3.9–12.7)
WBC # BLD AUTO: 6.53 K/UL (ref 3.9–12.7)
WBC # BLD AUTO: 6.79 K/UL (ref 3.9–12.7)
WBC #/AREA URNS HPF: 0 /HPF (ref 0–5)
WBC #/AREA URNS HPF: 2 /HPF (ref 0–5)
WBC #/AREA URNS HPF: 36 /HPF (ref 0–5)
WBC CLUMPS URNS QL MICRO: ABNORMAL

## 2020-01-01 PROCEDURE — 93010 ELECTROCARDIOGRAM REPORT: CPT | Mod: ,,, | Performed by: INTERNAL MEDICINE

## 2020-01-01 PROCEDURE — 84484 ASSAY OF TROPONIN QUANT: CPT | Mod: 91

## 2020-01-01 PROCEDURE — 99292 CRITICAL CARE ADDL 30 MIN: CPT | Mod: 25

## 2020-01-01 PROCEDURE — 80053 COMPREHEN METABOLIC PANEL: CPT

## 2020-01-01 PROCEDURE — 86140 C-REACTIVE PROTEIN: CPT

## 2020-01-01 PROCEDURE — 94729 DIFFUSING CAPACITY: CPT

## 2020-01-01 PROCEDURE — 96374 THER/PROPH/DIAG INJ IV PUSH: CPT

## 2020-01-01 PROCEDURE — 99291 CRITICAL CARE FIRST HOUR: CPT | Mod: 25

## 2020-01-01 PROCEDURE — 93010 EKG 12-LEAD: ICD-10-PCS | Mod: ,,, | Performed by: INTERNAL MEDICINE

## 2020-01-01 PROCEDURE — 82550 ASSAY OF CK (CPK): CPT

## 2020-01-01 PROCEDURE — 84484 ASSAY OF TROPONIN QUANT: CPT

## 2020-01-01 PROCEDURE — 82272 OCCULT BLD FECES 1-3 TESTS: CPT

## 2020-01-01 PROCEDURE — 96372 THER/PROPH/DIAG INJ SC/IM: CPT

## 2020-01-01 PROCEDURE — 85025 COMPLETE CBC W/AUTO DIFF WBC: CPT

## 2020-01-01 PROCEDURE — 25000003 PHARM REV CODE 250

## 2020-01-01 PROCEDURE — 87086 URINE CULTURE/COLONY COUNT: CPT

## 2020-01-01 PROCEDURE — 82803 BLOOD GASES ANY COMBINATION: CPT

## 2020-01-01 PROCEDURE — 99999 PR PBB SHADOW E&M-EST. PATIENT-LVL II: ICD-10-PCS | Mod: PBBFAC,,,

## 2020-01-01 PROCEDURE — 93010 EKG 12-LEAD: ICD-10-PCS | Mod: 59,,, | Performed by: INTERNAL MEDICINE

## 2020-01-01 PROCEDURE — 83935 ASSAY OF URINE OSMOLALITY: CPT

## 2020-01-01 PROCEDURE — 63600175 PHARM REV CODE 636 W HCPCS

## 2020-01-01 PROCEDURE — 25000003 PHARM REV CODE 250: Performed by: STUDENT IN AN ORGANIZED HEALTH CARE EDUCATION/TRAINING PROGRAM

## 2020-01-01 PROCEDURE — 87040 BLOOD CULTURE FOR BACTERIA: CPT

## 2020-01-01 PROCEDURE — 25500020 PHARM REV CODE 255: Performed by: PHYSICIAN ASSISTANT

## 2020-01-01 PROCEDURE — 80307 DRUG TEST PRSMV CHEM ANLYZR: CPT

## 2020-01-01 PROCEDURE — 36430 TRANSFUSION BLD/BLD COMPNT: CPT | Mod: 59

## 2020-01-01 PROCEDURE — S4991 NICOTINE PATCH NONLEGEND: HCPCS | Performed by: STUDENT IN AN ORGANIZED HEALTH CARE EDUCATION/TRAINING PROGRAM

## 2020-01-01 PROCEDURE — 94640 AIRWAY INHALATION TREATMENT: CPT

## 2020-01-01 PROCEDURE — 94002 VENT MGMT INPAT INIT DAY: CPT | Mod: 59

## 2020-01-01 PROCEDURE — 99999 PR PBB SHADOW E&M-EST. PATIENT-LVL I: CPT | Mod: PBBFAC,,,

## 2020-01-01 PROCEDURE — 63600175 PHARM REV CODE 636 W HCPCS: Performed by: EMERGENCY MEDICINE

## 2020-01-01 PROCEDURE — 51702 INSERT TEMP BLADDER CATH: CPT

## 2020-01-01 PROCEDURE — 99402 PR PREVENT COUNSEL,INDIV,30 MIN: ICD-10-PCS | Mod: S$GLB,,,

## 2020-01-01 PROCEDURE — 36415 COLL VENOUS BLD VENIPUNCTURE: CPT

## 2020-01-01 PROCEDURE — 99225 PR SUBSEQUENT OBSERVATION CARE,LEVEL II: CPT | Mod: ,,, | Performed by: STUDENT IN AN ORGANIZED HEALTH CARE EDUCATION/TRAINING PROGRAM

## 2020-01-01 PROCEDURE — 94761 N-INVAS EAR/PLS OXIMETRY MLT: CPT

## 2020-01-01 PROCEDURE — 99404 PR PREVENT COUNSEL,INDIV,60 MIN: ICD-10-PCS | Mod: S$GLB,,,

## 2020-01-01 PROCEDURE — 85610 PROTHROMBIN TIME: CPT | Mod: 91

## 2020-01-01 PROCEDURE — 83880 ASSAY OF NATRIURETIC PEPTIDE: CPT

## 2020-01-01 PROCEDURE — 80048 BASIC METABOLIC PNL TOTAL CA: CPT

## 2020-01-01 PROCEDURE — 81000 URINALYSIS NONAUTO W/SCOPE: CPT

## 2020-01-01 PROCEDURE — G0378 HOSPITAL OBSERVATION PER HR: HCPCS

## 2020-01-01 PROCEDURE — 25000003 PHARM REV CODE 250: Performed by: NURSE PRACTITIONER

## 2020-01-01 PROCEDURE — 99285 EMERGENCY DEPT VISIT HI MDM: CPT | Mod: 25

## 2020-01-01 PROCEDURE — 83690 ASSAY OF LIPASE: CPT

## 2020-01-01 PROCEDURE — 63600175 PHARM REV CODE 636 W HCPCS: Performed by: STUDENT IN AN ORGANIZED HEALTH CARE EDUCATION/TRAINING PROGRAM

## 2020-01-01 PROCEDURE — 93005 ELECTROCARDIOGRAM TRACING: CPT | Mod: 59

## 2020-01-01 PROCEDURE — 83605 ASSAY OF LACTIC ACID: CPT

## 2020-01-01 PROCEDURE — U0002 COVID-19 LAB TEST NON-CDC: HCPCS

## 2020-01-01 PROCEDURE — 99900035 HC TECH TIME PER 15 MIN (STAT)

## 2020-01-01 PROCEDURE — 96368 THER/DIAG CONCURRENT INF: CPT

## 2020-01-01 PROCEDURE — 93005 ELECTROCARDIOGRAM TRACING: CPT

## 2020-01-01 PROCEDURE — 36600 WITHDRAWAL OF ARTERIAL BLOOD: CPT

## 2020-01-01 PROCEDURE — 85730 THROMBOPLASTIN TIME PARTIAL: CPT

## 2020-01-01 PROCEDURE — 96376 TX/PRO/DX INJ SAME DRUG ADON: CPT

## 2020-01-01 PROCEDURE — 86901 BLOOD TYPING SEROLOGIC RH(D): CPT

## 2020-01-01 PROCEDURE — 99999 PR PBB SHADOW E&M-EST. PATIENT-LVL I: ICD-10-PCS | Mod: PBBFAC,,,

## 2020-01-01 PROCEDURE — 83735 ASSAY OF MAGNESIUM: CPT

## 2020-01-01 PROCEDURE — A4216 STERILE WATER/SALINE, 10 ML: HCPCS | Performed by: EMERGENCY MEDICINE

## 2020-01-01 PROCEDURE — 99404 PREV MED CNSL INDIV APPRX 60: CPT | Mod: S$GLB,,,

## 2020-01-01 PROCEDURE — 85610 PROTHROMBIN TIME: CPT

## 2020-01-01 PROCEDURE — 96365 THER/PROPH/DIAG IV INF INIT: CPT | Mod: 59

## 2020-01-01 PROCEDURE — 84300 ASSAY OF URINE SODIUM: CPT

## 2020-01-01 PROCEDURE — 93010 EKG 12-LEAD: ICD-10-PCS | Mod: 77,,, | Performed by: INTERNAL MEDICINE

## 2020-01-01 PROCEDURE — U0002 COVID-19 LAB TEST NON-CDC: HCPCS | Performed by: EMERGENCY MEDICINE

## 2020-01-01 PROCEDURE — 31500 INSERT EMERGENCY AIRWAY: CPT

## 2020-01-01 PROCEDURE — 99225 PR SUBSEQUENT OBSERVATION CARE,LEVEL II: ICD-10-PCS | Mod: ,,, | Performed by: STUDENT IN AN ORGANIZED HEALTH CARE EDUCATION/TRAINING PROGRAM

## 2020-01-01 PROCEDURE — 99402 PREV MED CNSL INDIV APPRX 30: CPT | Mod: S$GLB,,,

## 2020-01-01 PROCEDURE — 27000221 HC OXYGEN, UP TO 24 HOURS

## 2020-01-01 PROCEDURE — 96375 TX/PRO/DX INJ NEW DRUG ADDON: CPT

## 2020-01-01 PROCEDURE — P9016 RBC LEUKOCYTES REDUCED: HCPCS

## 2020-01-01 PROCEDURE — 96365 THER/PROPH/DIAG IV INF INIT: CPT

## 2020-01-01 PROCEDURE — 25000003 PHARM REV CODE 250: Performed by: EMERGENCY MEDICINE

## 2020-01-01 PROCEDURE — 85027 COMPLETE CBC AUTOMATED: CPT | Mod: 59

## 2020-01-01 PROCEDURE — 93010 ELECTROCARDIOGRAM REPORT: CPT | Mod: 77,,, | Performed by: INTERNAL MEDICINE

## 2020-01-01 PROCEDURE — 81003 URINALYSIS AUTO W/O SCOPE: CPT

## 2020-01-01 PROCEDURE — 25000242 PHARM REV CODE 250 ALT 637 W/ HCPCS: Performed by: STUDENT IN AN ORGANIZED HEALTH CARE EDUCATION/TRAINING PROGRAM

## 2020-01-01 PROCEDURE — 84145 PROCALCITONIN (PCT): CPT

## 2020-01-01 PROCEDURE — 94726 PLETHYSMOGRAPHY LUNG VOLUMES: CPT

## 2020-01-01 PROCEDURE — 85027 COMPLETE CBC AUTOMATED: CPT

## 2020-01-01 PROCEDURE — 86920 COMPATIBILITY TEST SPIN: CPT

## 2020-01-01 PROCEDURE — 25500020 PHARM REV CODE 255: Performed by: EMERGENCY MEDICINE

## 2020-01-01 PROCEDURE — 94010 BREATHING CAPACITY TEST: CPT

## 2020-01-01 PROCEDURE — 83605 ASSAY OF LACTIC ACID: CPT | Mod: 91

## 2020-01-01 PROCEDURE — 99999 PR PBB SHADOW E&M-EST. PATIENT-LVL II: CPT | Mod: PBBFAC,,,

## 2020-01-01 PROCEDURE — 63600175 PHARM REV CODE 636 W HCPCS: Performed by: INTERNAL MEDICINE

## 2020-01-01 PROCEDURE — 82728 ASSAY OF FERRITIN: CPT

## 2020-01-01 PROCEDURE — 63600175 PHARM REV CODE 636 W HCPCS: Performed by: NURSE PRACTITIONER

## 2020-01-01 PROCEDURE — 84100 ASSAY OF PHOSPHORUS: CPT

## 2020-01-01 PROCEDURE — C9132 KCENTRA, PER I.U.: HCPCS | Mod: JG | Performed by: EMERGENCY MEDICINE

## 2020-01-01 PROCEDURE — 85379 FIBRIN DEGRADATION QUANT: CPT

## 2020-01-01 PROCEDURE — 96375 TX/PRO/DX INJ NEW DRUG ADDON: CPT | Mod: 59

## 2020-01-01 PROCEDURE — 25000242 PHARM REV CODE 250 ALT 637 W/ HCPCS: Performed by: INTERNAL MEDICINE

## 2020-01-01 PROCEDURE — 87040 BLOOD CULTURE FOR BACTERIA: CPT | Mod: 59

## 2020-01-01 PROCEDURE — 83615 LACTATE (LD) (LDH) ENZYME: CPT

## 2020-01-01 RX ORDER — AMLODIPINE BESYLATE 5 MG/1
5 TABLET ORAL DAILY
Status: DISCONTINUED | OUTPATIENT
Start: 2020-01-01 | End: 2020-01-01

## 2020-01-01 RX ORDER — LEVOTHYROXINE SODIUM 50 UG/1
50 TABLET ORAL
Status: DISCONTINUED | OUTPATIENT
Start: 2020-01-01 | End: 2020-01-01 | Stop reason: HOSPADM

## 2020-01-01 RX ORDER — FUROSEMIDE 10 MG/ML
20 INJECTION INTRAMUSCULAR; INTRAVENOUS
Status: COMPLETED | OUTPATIENT
Start: 2020-01-01 | End: 2020-01-01

## 2020-01-01 RX ORDER — IBUPROFEN 200 MG
1 TABLET ORAL DAILY
Status: DISCONTINUED | OUTPATIENT
Start: 2020-01-01 | End: 2020-01-01 | Stop reason: HOSPADM

## 2020-01-01 RX ORDER — IPRATROPIUM BROMIDE AND ALBUTEROL SULFATE 2.5; .5 MG/3ML; MG/3ML
3 SOLUTION RESPIRATORY (INHALATION) EVERY 4 HOURS PRN
Status: DISCONTINUED | OUTPATIENT
Start: 2020-01-01 | End: 2020-01-01 | Stop reason: HOSPADM

## 2020-01-01 RX ORDER — IBUPROFEN 200 MG
1 TABLET ORAL DAILY
Qty: 28 PATCH | Refills: 0 | Status: SHIPPED | OUTPATIENT
Start: 2020-01-01

## 2020-01-01 RX ORDER — LISINOPRIL 20 MG/1
20 TABLET ORAL DAILY
Qty: 90 TABLET | Refills: 3 | Status: SHIPPED | OUTPATIENT
Start: 2020-01-01 | End: 2020-01-01 | Stop reason: HOSPADM

## 2020-01-01 RX ORDER — FUROSEMIDE 10 MG/ML
60 INJECTION INTRAMUSCULAR; INTRAVENOUS ONCE
Status: COMPLETED | OUTPATIENT
Start: 2020-01-01 | End: 2020-01-01

## 2020-01-01 RX ORDER — LEVOTHYROXINE SODIUM 50 UG/1
50 TABLET ORAL
COMMUNITY

## 2020-01-01 RX ORDER — FUROSEMIDE 40 MG/1
40 TABLET ORAL 2 TIMES DAILY
Qty: 180 TABLET | Refills: 3 | Status: ON HOLD | OUTPATIENT
Start: 2020-01-01 | End: 2020-01-01 | Stop reason: HOSPADM

## 2020-01-01 RX ORDER — HYDROCODONE BITARTRATE AND ACETAMINOPHEN 500; 5 MG/1; MG/1
TABLET ORAL
Status: DISCONTINUED | OUTPATIENT
Start: 2020-01-01 | End: 2020-01-01

## 2020-01-01 RX ORDER — ALBUTEROL SULFATE 90 UG/1
2 AEROSOL, METERED RESPIRATORY (INHALATION) EVERY 4 HOURS PRN
Status: DISCONTINUED | OUTPATIENT
Start: 2020-01-01 | End: 2020-01-01

## 2020-01-01 RX ORDER — VANCOMYCIN HCL IN 5 % DEXTROSE 1G/250ML
15 PLASTIC BAG, INJECTION (ML) INTRAVENOUS
Status: DISCONTINUED | OUTPATIENT
Start: 2020-01-01 | End: 2020-01-01

## 2020-01-01 RX ORDER — CEFEPIME HYDROCHLORIDE 1 G/50ML
1 INJECTION, SOLUTION INTRAVENOUS
Status: COMPLETED | OUTPATIENT
Start: 2020-01-01 | End: 2020-01-01

## 2020-01-01 RX ORDER — FLUTICASONE FUROATE AND VILANTEROL 200; 25 UG/1; UG/1
1 POWDER RESPIRATORY (INHALATION) DAILY
Status: DISCONTINUED | OUTPATIENT
Start: 2020-01-01 | End: 2020-01-01 | Stop reason: HOSPADM

## 2020-01-01 RX ORDER — TORSEMIDE 20 MG/1
20 TABLET ORAL 2 TIMES DAILY
Qty: 60 TABLET | Refills: 11 | Status: ON HOLD | OUTPATIENT
Start: 2020-01-01 | End: 2020-01-01

## 2020-01-01 RX ORDER — FUROSEMIDE 10 MG/ML
100 INJECTION INTRAMUSCULAR; INTRAVENOUS
Status: COMPLETED | OUTPATIENT
Start: 2020-01-01 | End: 2020-01-01

## 2020-01-01 RX ORDER — SODIUM CHLORIDE 0.9 % (FLUSH) 0.9 %
10 SYRINGE (ML) INJECTION
Status: DISCONTINUED | OUTPATIENT
Start: 2020-01-01 | End: 2020-01-01 | Stop reason: HOSPADM

## 2020-01-01 RX ORDER — ALBUTEROL SULFATE 2.5 MG/.5ML
2.5 SOLUTION RESPIRATORY (INHALATION) EVERY 4 HOURS PRN
Status: DISCONTINUED | OUTPATIENT
Start: 2020-01-01 | End: 2020-01-01 | Stop reason: HOSPADM

## 2020-01-01 RX ORDER — MANNITOL 20 G/100ML
68 INJECTION, SOLUTION INTRAVENOUS ONCE
Status: COMPLETED | OUTPATIENT
Start: 2020-01-01 | End: 2020-01-01

## 2020-01-01 RX ORDER — FUROSEMIDE 10 MG/ML
80 INJECTION INTRAMUSCULAR; INTRAVENOUS
Status: DISCONTINUED | OUTPATIENT
Start: 2020-01-01 | End: 2020-01-01 | Stop reason: HOSPADM

## 2020-01-01 RX ORDER — FUROSEMIDE 10 MG/ML
80 INJECTION INTRAMUSCULAR; INTRAVENOUS
Status: DISCONTINUED | OUTPATIENT
Start: 2020-01-01 | End: 2020-01-01

## 2020-01-01 RX ORDER — CETIRIZINE HYDROCHLORIDE 10 MG/1
10 TABLET ORAL DAILY
Status: DISCONTINUED | OUTPATIENT
Start: 2020-01-01 | End: 2020-01-01 | Stop reason: HOSPADM

## 2020-01-01 RX ORDER — FUROSEMIDE 10 MG/ML
80 INJECTION INTRAMUSCULAR; INTRAVENOUS
Status: COMPLETED | OUTPATIENT
Start: 2020-01-01 | End: 2020-01-01

## 2020-01-01 RX ORDER — ALBUTEROL SULFATE 90 UG/1
2 AEROSOL, METERED RESPIRATORY (INHALATION) EVERY 4 HOURS PRN
Status: DISCONTINUED | OUTPATIENT
Start: 2020-01-01 | End: 2020-01-01 | Stop reason: HOSPADM

## 2020-01-01 RX ORDER — METOPROLOL SUCCINATE 25 MG/1
12.5 TABLET, EXTENDED RELEASE ORAL DAILY
Qty: 15 TABLET | Refills: 11 | Status: SHIPPED | OUTPATIENT
Start: 2020-01-01 | End: 2021-09-26

## 2020-01-01 RX ORDER — IBUPROFEN 200 MG
1 TABLET ORAL DAILY
Qty: 14 PATCH | Refills: 0 | Status: ON HOLD | OUTPATIENT
Start: 2020-01-01 | End: 2020-01-01 | Stop reason: HOSPADM

## 2020-01-01 RX ORDER — MANNITOL 250 MG/ML
68 INJECTION, SOLUTION INTRAVENOUS
Status: DISCONTINUED | OUTPATIENT
Start: 2020-01-01 | End: 2020-01-01

## 2020-01-01 RX ORDER — ENOXAPARIN SODIUM 100 MG/ML
40 INJECTION SUBCUTANEOUS DAILY
Status: DISCONTINUED | OUTPATIENT
Start: 2020-01-01 | End: 2020-01-01 | Stop reason: HOSPADM

## 2020-01-01 RX ORDER — ALBUTEROL SULFATE 90 UG/1
2 AEROSOL, METERED RESPIRATORY (INHALATION) EVERY 4 HOURS PRN
Qty: 6.7 G | Refills: 0 | Status: SHIPPED | OUTPATIENT
Start: 2020-01-01 | End: 2020-01-01

## 2020-01-01 RX ORDER — ENOXAPARIN SODIUM 100 MG/ML
1 INJECTION SUBCUTANEOUS
Status: COMPLETED | OUTPATIENT
Start: 2020-01-01 | End: 2020-01-01

## 2020-01-01 RX ORDER — PROPOFOL 10 MG/ML
INJECTION, EMULSION INTRAVENOUS
Status: COMPLETED
Start: 2020-01-01 | End: 2020-01-01

## 2020-01-01 RX ORDER — METOPROLOL SUCCINATE 50 MG/1
50 TABLET, EXTENDED RELEASE ORAL DAILY
Qty: 90 TABLET | Refills: 3 | Status: ON HOLD | OUTPATIENT
Start: 2020-01-01 | End: 2020-01-01

## 2020-01-01 RX ORDER — FUROSEMIDE 10 MG/ML
80 INJECTION INTRAMUSCULAR; INTRAVENOUS ONCE
Status: DISCONTINUED | OUTPATIENT
Start: 2020-01-01 | End: 2020-01-01

## 2020-01-01 RX ORDER — ROCURONIUM BROMIDE 10 MG/ML
0.6 INJECTION, SOLUTION INTRAVENOUS
Status: COMPLETED | OUTPATIENT
Start: 2020-01-01 | End: 2020-01-01

## 2020-01-01 RX ORDER — PROPOFOL 10 MG/ML
0-50 INJECTION, EMULSION INTRAVENOUS
Status: COMPLETED | OUTPATIENT
Start: 2020-01-01 | End: 2020-01-01

## 2020-01-01 RX ORDER — POLYETHYLENE GLYCOL 3350 17 G/17G
17 POWDER, FOR SOLUTION ORAL DAILY
Status: DISCONTINUED | OUTPATIENT
Start: 2020-01-01 | End: 2020-01-01 | Stop reason: HOSPADM

## 2020-01-01 RX ORDER — ASPIRIN 325 MG
325 TABLET ORAL
Status: COMPLETED | OUTPATIENT
Start: 2020-01-01 | End: 2020-01-01

## 2020-01-01 RX ORDER — ETOMIDATE 2 MG/ML
INJECTION INTRAVENOUS
Status: DISCONTINUED
Start: 2020-01-01 | End: 2020-01-01 | Stop reason: WASHOUT

## 2020-01-01 RX ORDER — METOPROLOL TARTRATE 100 MG/1
12.5 TABLET ORAL 2 TIMES DAILY
Status: ON HOLD | COMMUNITY
End: 2020-01-01 | Stop reason: HOSPADM

## 2020-01-01 RX ORDER — TORSEMIDE 20 MG/1
20 TABLET ORAL 2 TIMES DAILY
Qty: 60 TABLET | Refills: 11 | Status: SHIPPED | OUTPATIENT
Start: 2020-01-01 | End: 2020-01-01 | Stop reason: SDUPTHER

## 2020-01-01 RX ORDER — ALBUTEROL SULFATE 2.5 MG/.5ML
2.5 SOLUTION RESPIRATORY (INHALATION) EVERY 4 HOURS PRN
Status: DISCONTINUED | OUTPATIENT
Start: 2020-01-01 | End: 2020-01-01

## 2020-01-01 RX ORDER — PROPOFOL 10 MG/ML
INJECTION, EMULSION INTRAVENOUS
Status: DISCONTINUED
Start: 2020-01-01 | End: 2020-01-01 | Stop reason: HOSPADM

## 2020-01-01 RX ORDER — TRAMADOL HYDROCHLORIDE 100 MG/1
50 TABLET, EXTENDED RELEASE ORAL 2 TIMES DAILY
COMMUNITY

## 2020-01-01 RX ORDER — SENNOSIDES 8.6 MG/1
8.6 TABLET ORAL DAILY
Status: DISCONTINUED | OUTPATIENT
Start: 2020-01-01 | End: 2020-01-01 | Stop reason: HOSPADM

## 2020-01-01 RX ORDER — GUAIFENESIN 600 MG/1
1200 TABLET, EXTENDED RELEASE ORAL 2 TIMES DAILY
Qty: 40 TABLET | Refills: 0 | Status: SHIPPED | OUTPATIENT
Start: 2020-01-01 | End: 2020-01-01 | Stop reason: SDUPTHER

## 2020-01-01 RX ORDER — GUAIFENESIN 100 MG/5ML
200 SOLUTION ORAL EVERY 4 HOURS PRN
Status: DISCONTINUED | OUTPATIENT
Start: 2020-01-01 | End: 2020-01-01 | Stop reason: HOSPADM

## 2020-01-01 RX ORDER — CEFTRIAXONE 1 G/1
1 INJECTION, POWDER, FOR SOLUTION INTRAMUSCULAR; INTRAVENOUS
Status: DISCONTINUED | OUTPATIENT
Start: 2020-01-01 | End: 2020-01-01

## 2020-01-01 RX ORDER — PROPOFOL 10 MG/ML
40 VIAL (ML) INTRAVENOUS
Status: DISCONTINUED | OUTPATIENT
Start: 2020-01-01 | End: 2020-01-01

## 2020-01-01 RX ORDER — FUROSEMIDE 10 MG/ML
40 INJECTION INTRAMUSCULAR; INTRAVENOUS
Status: COMPLETED | OUTPATIENT
Start: 2020-01-01 | End: 2020-01-01

## 2020-01-01 RX ORDER — GUAIFENESIN 600 MG/1
1200 TABLET, EXTENDED RELEASE ORAL 2 TIMES DAILY
Qty: 40 TABLET | Refills: 0 | Status: SHIPPED | OUTPATIENT
Start: 2020-01-01 | End: 2020-01-01

## 2020-01-01 RX ORDER — METOPROLOL SUCCINATE 50 MG/1
50 TABLET, EXTENDED RELEASE ORAL DAILY
Status: DISCONTINUED | OUTPATIENT
Start: 2020-01-01 | End: 2020-01-01 | Stop reason: HOSPADM

## 2020-01-01 RX ORDER — HYDROCODONE BITARTRATE AND ACETAMINOPHEN 500; 5 MG/1; MG/1
TABLET ORAL
Status: DISCONTINUED | OUTPATIENT
Start: 2020-01-01 | End: 2020-01-01 | Stop reason: HOSPADM

## 2020-01-01 RX ORDER — NICARDIPINE HYDROCHLORIDE 0.2 MG/ML
2.5 INJECTION INTRAVENOUS CONTINUOUS
Status: DISCONTINUED | OUTPATIENT
Start: 2020-01-01 | End: 2020-01-01 | Stop reason: HOSPADM

## 2020-01-01 RX ORDER — POTASSIUM CHLORIDE 1.5 G/1.58G
40 POWDER, FOR SOLUTION ORAL
Status: COMPLETED | OUTPATIENT
Start: 2020-01-01 | End: 2020-01-01

## 2020-01-01 RX ORDER — ACETAMINOPHEN 325 MG/1
650 TABLET ORAL EVERY 6 HOURS PRN
Status: DISCONTINUED | OUTPATIENT
Start: 2020-01-01 | End: 2020-01-01 | Stop reason: HOSPADM

## 2020-01-01 RX ORDER — ROCURONIUM BROMIDE 10 MG/ML
INJECTION, SOLUTION INTRAVENOUS
Status: COMPLETED
Start: 2020-01-01 | End: 2020-01-01

## 2020-01-01 RX ORDER — LISINOPRIL 20 MG/1
20 TABLET ORAL DAILY
Status: DISCONTINUED | OUTPATIENT
Start: 2020-01-01 | End: 2020-01-01 | Stop reason: HOSPADM

## 2020-01-01 RX ORDER — BUMETANIDE 1 MG/1
1 TABLET ORAL 2 TIMES DAILY
COMMUNITY

## 2020-01-01 RX ADMIN — ROCURONIUM BROMIDE 41 MG: 10 INJECTION, SOLUTION INTRAVENOUS at 01:11

## 2020-01-01 RX ADMIN — PROPOFOL 40 MG: 10 INJECTION, EMULSION INTRAVENOUS at 01:11

## 2020-01-01 RX ADMIN — FUROSEMIDE 20 MG: 10 INJECTION, SOLUTION INTRAMUSCULAR; INTRAVENOUS at 10:04

## 2020-01-01 RX ADMIN — FUROSEMIDE 40 MG: 10 INJECTION, SOLUTION INTRAVENOUS at 09:04

## 2020-01-01 RX ADMIN — FUROSEMIDE 80 MG: 10 INJECTION, SOLUTION INTRAVENOUS at 04:04

## 2020-01-01 RX ADMIN — POTASSIUM CHLORIDE 40 MEQ: 1.5 FOR SOLUTION ORAL at 03:09

## 2020-01-01 RX ADMIN — LEVOTHYROXINE SODIUM 50 MCG: 50 TABLET ORAL at 05:09

## 2020-01-01 RX ADMIN — LISINOPRIL 20 MG: 20 TABLET ORAL at 08:04

## 2020-01-01 RX ADMIN — LEVETIRACETAM 2040 MG: 100 INJECTION, SOLUTION INTRAVENOUS at 01:11

## 2020-01-01 RX ADMIN — CETIRIZINE HYDROCHLORIDE 10 MG: 10 TABLET, FILM COATED ORAL at 09:04

## 2020-01-01 RX ADMIN — FUROSEMIDE 100 MG: 10 INJECTION, SOLUTION INTRAVENOUS at 07:04

## 2020-01-01 RX ADMIN — FLUTICASONE FUROATE AND VILANTEROL TRIFENATATE 1 PUFF: 200; 25 POWDER RESPIRATORY (INHALATION) at 11:04

## 2020-01-01 RX ADMIN — IOHEXOL 100 ML: 350 INJECTION, SOLUTION INTRAVENOUS at 12:04

## 2020-01-01 RX ADMIN — APIXABAN 10 MG: 5 TABLET, FILM COATED ORAL at 08:04

## 2020-01-01 RX ADMIN — CEFTRIAXONE 1 G: 1 INJECTION, SOLUTION INTRAVENOUS at 02:09

## 2020-01-01 RX ADMIN — METOPROLOL SUCCINATE 12.5 MG: 25 TABLET, EXTENDED RELEASE ORAL at 10:09

## 2020-01-01 RX ADMIN — ENOXAPARIN SODIUM 70 MG: 100 INJECTION SUBCUTANEOUS at 04:04

## 2020-01-01 RX ADMIN — SODIUM CHLORIDE, SODIUM LACTATE, POTASSIUM CHLORIDE, AND CALCIUM CHLORIDE 500 ML: .6; .31; .03; .02 INJECTION, SOLUTION INTRAVENOUS at 08:09

## 2020-01-01 RX ADMIN — FUROSEMIDE 60 MG: 10 INJECTION, SOLUTION INTRAMUSCULAR; INTRAVENOUS at 04:04

## 2020-01-01 RX ADMIN — PHYTONADIONE 10 MG: 10 INJECTION, EMULSION INTRAMUSCULAR; INTRAVENOUS; SUBCUTANEOUS at 01:11

## 2020-01-01 RX ADMIN — FLUTICASONE FUROATE AND VILANTEROL TRIFENATATE 1 PUFF: 200; 25 POWDER RESPIRATORY (INHALATION) at 07:04

## 2020-01-01 RX ADMIN — SENNOSIDES 8.6 MG: 8.6 TABLET, FILM COATED ORAL at 10:09

## 2020-01-01 RX ADMIN — Medication 1530 UNITS: at 01:11

## 2020-01-01 RX ADMIN — CETIRIZINE HYDROCHLORIDE 10 MG: 10 TABLET, FILM COATED ORAL at 08:04

## 2020-01-01 RX ADMIN — FUROSEMIDE 80 MG: 10 INJECTION, SOLUTION INTRAVENOUS at 05:04

## 2020-01-01 RX ADMIN — ENOXAPARIN SODIUM 40 MG: 100 INJECTION SUBCUTANEOUS at 12:04

## 2020-01-01 RX ADMIN — VANCOMYCIN HYDROCHLORIDE 1750 MG: 100 INJECTION, POWDER, LYOPHILIZED, FOR SOLUTION INTRAVENOUS at 01:04

## 2020-01-01 RX ADMIN — IOHEXOL 125 ML: 350 INJECTION, SOLUTION INTRAVENOUS at 07:09

## 2020-01-01 RX ADMIN — NICOTINE 1 PATCH: 14 PATCH TRANSDERMAL at 08:04

## 2020-01-01 RX ADMIN — ALBUTEROL SULFATE 2 PUFF: 90 AEROSOL, METERED RESPIRATORY (INHALATION) at 04:04

## 2020-01-01 RX ADMIN — MANNITOL 68 G: 20 INJECTION, SOLUTION INTRAVENOUS at 01:11

## 2020-01-01 RX ADMIN — FUROSEMIDE 60 MG: 10 INJECTION, SOLUTION INTRAMUSCULAR; INTRAVENOUS at 08:04

## 2020-01-01 RX ADMIN — POLYETHYLENE GLYCOL (3350) 17 G: 17 POWDER, FOR SOLUTION ORAL at 10:09

## 2020-01-01 RX ADMIN — GUAIFENESIN 200 MG: 200 SOLUTION ORAL at 05:04

## 2020-01-01 RX ADMIN — FUROSEMIDE 80 MG: 10 INJECTION, SOLUTION INTRAMUSCULAR; INTRAVENOUS at 12:04

## 2020-01-01 RX ADMIN — METOPROLOL SUCCINATE 12.5 MG: 25 TABLET, EXTENDED RELEASE ORAL at 09:09

## 2020-01-01 RX ADMIN — FUROSEMIDE 80 MG: 10 INJECTION, SOLUTION INTRAVENOUS at 10:04

## 2020-01-01 RX ADMIN — CEFTRIAXONE 1 G: 1 INJECTION, SOLUTION INTRAVENOUS at 12:09

## 2020-01-01 RX ADMIN — ASPIRIN 325 MG ORAL TABLET 325 MG: 325 PILL ORAL at 01:04

## 2020-01-01 RX ADMIN — METOPROLOL SUCCINATE 50 MG: 50 TABLET, EXTENDED RELEASE ORAL at 08:04

## 2020-01-01 RX ADMIN — GUAIFENESIN 200 MG: 200 SOLUTION ORAL at 08:04

## 2020-01-01 RX ADMIN — Medication 1 PATCH: at 05:04

## 2020-01-01 RX ADMIN — CEFEPIME HYDROCHLORIDE 1 G: 1 INJECTION, SOLUTION INTRAVENOUS at 01:04

## 2020-01-01 RX ADMIN — METOPROLOL SUCCINATE 50 MG: 50 TABLET, EXTENDED RELEASE ORAL at 09:04

## 2020-01-01 RX ADMIN — GUAIFENESIN 200 MG: 200 SOLUTION ORAL at 09:04

## 2020-01-01 RX ADMIN — GUAIFENESIN 200 MG: 200 SOLUTION ORAL at 04:04

## 2020-01-01 RX ADMIN — Medication 1 PATCH: at 08:04

## 2020-01-20 ENCOUNTER — HOSPITAL ENCOUNTER (EMERGENCY)
Facility: HOSPITAL | Age: 60
Discharge: HOME | End: 2020-01-20
Attending: EMERGENCY MEDICINE
Payer: MEDICAID

## 2020-01-20 VITALS
SYSTOLIC BLOOD PRESSURE: 156 MMHG | OXYGEN SATURATION: 100 % | TEMPERATURE: 99 F | DIASTOLIC BLOOD PRESSURE: 84 MMHG | HEIGHT: 74 IN | RESPIRATION RATE: 18 BRPM | HEART RATE: 72 BPM | BODY MASS INDEX: 20.53 KG/M2 | WEIGHT: 160 LBS

## 2020-01-20 DIAGNOSIS — S52.209A: ICD-10-CM

## 2020-01-20 DIAGNOSIS — R52 PAIN: Primary | ICD-10-CM

## 2020-01-20 PROCEDURE — 25000003 PHARM REV CODE 250: Performed by: PHYSICIAN ASSISTANT

## 2020-01-20 PROCEDURE — 29125 APPL SHORT ARM SPLINT STATIC: CPT | Mod: RT

## 2020-01-20 PROCEDURE — 99283 EMERGENCY DEPT VISIT LOW MDM: CPT | Mod: 25

## 2020-01-20 RX ORDER — HYDROCODONE BITARTRATE AND ACETAMINOPHEN 5; 325 MG/1; MG/1
1 TABLET ORAL EVERY 4 HOURS PRN
Qty: 12 TABLET | Refills: 0 | Status: SHIPPED | OUTPATIENT
Start: 2020-01-20

## 2020-01-20 RX ORDER — IBUPROFEN 600 MG/1
600 TABLET ORAL EVERY 6 HOURS PRN
Qty: 20 TABLET | Refills: 0 | Status: ON HOLD | OUTPATIENT
Start: 2020-01-20 | End: 2020-01-01 | Stop reason: HOSPADM

## 2020-01-20 RX ORDER — KETOROLAC TROMETHAMINE 10 MG/1
10 TABLET, FILM COATED ORAL
Status: COMPLETED | OUTPATIENT
Start: 2020-01-20 | End: 2020-01-20

## 2020-01-20 RX ADMIN — KETOROLAC TROMETHAMINE 10 MG: 10 TABLET, FILM COATED ORAL at 09:01
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
## 2020-02-17 ENCOUNTER — HOSPITAL ENCOUNTER (INPATIENT)
Facility: HOSPITAL | Age: 60
LOS: 1 days | Discharge: HOME | DRG: 281 | End: 2020-02-18
Attending: EMERGENCY MEDICINE | Admitting: INTERNAL MEDICINE
Payer: MEDICAID

## 2020-02-17 VITALS
DIASTOLIC BLOOD PRESSURE: 101 MMHG | TEMPERATURE: 98 F | SYSTOLIC BLOOD PRESSURE: 134 MMHG | OXYGEN SATURATION: 96 % | HEIGHT: 74 IN | BODY MASS INDEX: 18.99 KG/M2 | RESPIRATION RATE: 20 BRPM | WEIGHT: 148 LBS | HEART RATE: 81 BPM

## 2020-02-17 DIAGNOSIS — R05.9 COUGH: ICD-10-CM

## 2020-02-17 DIAGNOSIS — R06.02 SOB (SHORTNESS OF BREATH): ICD-10-CM

## 2020-02-17 DIAGNOSIS — R07.9 CHEST PAIN: ICD-10-CM

## 2020-02-17 DIAGNOSIS — I21.4 NSTEMI (NON-ST ELEVATED MYOCARDIAL INFARCTION): Primary | ICD-10-CM

## 2020-02-17 LAB
ABO + RH BLD: NORMAL
ALBUMIN SERPL BCP-MCNC: 3.3 G/DL (ref 3.5–5.2)
ALP SERPL-CCNC: 100 U/L (ref 55–135)
ALT SERPL W/O P-5'-P-CCNC: 27 U/L (ref 10–44)
ANION GAP SERPL CALC-SCNC: 10 MMOL/L (ref 8–16)
AORTIC ROOT ANNULUS: 2.84 CM
APTT BLDCRRT: 29.4 SEC (ref 21–32)
ASCENDING AORTA: 2.84 CM
AST SERPL-CCNC: 43 U/L (ref 10–40)
AV INDEX (PROSTH): 0.59
AV MEAN GRADIENT: 5 MMHG
AV PEAK GRADIENT: 8 MMHG
AV VALVE AREA: 2.29 CM2
AV VELOCITY RATIO: 0.51
BASOPHILS # BLD AUTO: 0.02 K/UL (ref 0–0.2)
BASOPHILS NFR BLD: 0.4 % (ref 0–1.9)
BILIRUB SERPL-MCNC: 0.5 MG/DL (ref 0.1–1)
BLD GP AB SCN CELLS X3 SERPL QL: NORMAL
BNP SERPL-MCNC: 1164 PG/ML (ref 0–99)
BSA FOR ECHO PROCEDURE: 1.87 M2
BUN SERPL-MCNC: 18 MG/DL (ref 6–20)
CALCIUM SERPL-MCNC: 8.5 MG/DL (ref 8.7–10.5)
CHLORIDE SERPL-SCNC: 106 MMOL/L (ref 95–110)
CO2 SERPL-SCNC: 22 MMOL/L (ref 23–29)
CREAT SERPL-MCNC: 1.3 MG/DL (ref 0.5–1.4)
CV ECHO LV RWT: 0.38 CM
DIFFERENTIAL METHOD: ABNORMAL
DOP CALC AO PEAK VEL: 1.38 M/S
DOP CALC AO VTI: 20.94 CM
DOP CALC LVOT AREA: 3.9 CM2
DOP CALC LVOT DIAMETER: 2.22 CM
DOP CALC LVOT PEAK VEL: 0.7 M/S
DOP CALC LVOT STROKE VOLUME: 48.01 CM3
DOP CALCLVOT PEAK VEL VTI: 12.41 CM
E WAVE DECELERATION TIME: 271.14 MSEC
E/A RATIO: 3.16
ECHO LV POSTERIOR WALL: 1.07 CM (ref 0.6–1.1)
EOSINOPHIL # BLD AUTO: 0.1 K/UL (ref 0–0.5)
EOSINOPHIL NFR BLD: 1.2 % (ref 0–8)
ERYTHROCYTE [DISTWIDTH] IN BLOOD BY AUTOMATED COUNT: 11.7 % (ref 11.5–14.5)
EST. GFR  (AFRICAN AMERICAN): >60 ML/MIN/1.73 M^2
EST. GFR  (NON AFRICAN AMERICAN): >60 ML/MIN/1.73 M^2
FRACTIONAL SHORTENING: 21 % (ref 28–44)
GLUCOSE SERPL-MCNC: 92 MG/DL (ref 70–110)
HCT VFR BLD AUTO: 34.6 % (ref 40–54)
HGB BLD-MCNC: 12.1 G/DL (ref 14–18)
IMM GRANULOCYTES # BLD AUTO: 0.01 K/UL (ref 0–0.04)
IMM GRANULOCYTES NFR BLD AUTO: 0.2 % (ref 0–0.5)
INR PPP: 1.1 (ref 0.8–1.2)
INTERVENTRICULAR SEPTUM: 0.98 CM (ref 0.6–1.1)
LA MAJOR: 6.82 CM
LA MINOR: 6.39 CM
LA WIDTH: 6.29 CM
LEFT ATRIUM SIZE: 4.28 CM
LEFT ATRIUM VOLUME INDEX: 79 ML/M2
LEFT ATRIUM VOLUME: 150.98 CM3
LEFT INTERNAL DIMENSION IN SYSTOLE: 4.48 CM (ref 2.1–4)
LEFT VENTRICLE DIASTOLIC VOLUME INDEX: 82.93 ML/M2
LEFT VENTRICLE DIASTOLIC VOLUME: 158.56 ML
LEFT VENTRICLE MASS INDEX: 122 G/M2
LEFT VENTRICLE SYSTOLIC VOLUME INDEX: 47.8 ML/M2
LEFT VENTRICLE SYSTOLIC VOLUME: 91.36 ML
LEFT VENTRICULAR INTERNAL DIMENSION IN DIASTOLE: 5.68 CM (ref 3.5–6)
LEFT VENTRICULAR MASS: 232.43 G
LYMPHOCYTES # BLD AUTO: 1.4 K/UL (ref 1–4.8)
LYMPHOCYTES NFR BLD: 26.4 % (ref 18–48)
MCH RBC QN AUTO: 32.7 PG (ref 27–31)
MCHC RBC AUTO-ENTMCNC: 35 G/DL (ref 32–36)
MCV RBC AUTO: 94 FL (ref 82–98)
MONOCYTES # BLD AUTO: 0.6 K/UL (ref 0.3–1)
MONOCYTES NFR BLD: 11.5 % (ref 4–15)
MV PEAK A VEL: 0.38 M/S
MV PEAK E VEL: 1.2 M/S
NEUTROPHILS # BLD AUTO: 3.1 K/UL (ref 1.8–7.7)
NEUTROPHILS NFR BLD: 60.3 % (ref 38–73)
NRBC BLD-RTO: 0 /100 WBC
PISA TR MAX VEL: 3.23 M/S
PLATELET # BLD AUTO: 232 K/UL (ref 150–350)
PMV BLD AUTO: 10.9 FL (ref 9.2–12.9)
POC ACTIVATED CLOTTING TIME K: 197 SEC (ref 74–137)
POC ACTIVATED CLOTTING TIME K: 224 SEC (ref 74–137)
POC ACTIVATED CLOTTING TIME K: 230 SEC (ref 74–137)
POTASSIUM SERPL-SCNC: 4.3 MMOL/L (ref 3.5–5.1)
PROT SERPL-MCNC: 7 G/DL (ref 6–8.4)
PROTHROMBIN TIME: 11.6 SEC (ref 9–12.5)
PULM VEIN S/D RATIO: 0.92
PV PEAK D VEL: 0.5 M/S
PV PEAK S VEL: 0.46 M/S
RA MAJOR: 6.23 CM
RA PRESSURE: 8 MMHG
RA WIDTH: 5.42 CM
RBC # BLD AUTO: 3.7 M/UL (ref 4.6–6.2)
RIGHT VENTRICULAR END-DIASTOLIC DIMENSION: 2.78 CM
SAMPLE: ABNORMAL
SODIUM SERPL-SCNC: 138 MMOL/L (ref 136–145)
STJ: 2.55 CM
TR MAX PG: 42 MMHG
TRICUSPID ANNULAR PLANE SYSTOLIC EXCURSION: 1.52 CM
TROPONIN I SERPL DL<=0.01 NG/ML-MCNC: 0.26 NG/ML (ref 0–0.03)
TROPONIN I SERPL DL<=0.01 NG/ML-MCNC: 0.27 NG/ML (ref 0–0.03)
TV REST PULMONARY ARTERY PRESSURE: 50 MMHG
WBC # BLD AUTO: 5.12 K/UL (ref 3.9–12.7)

## 2020-02-17 PROCEDURE — 93005 ELECTROCARDIOGRAM TRACING: CPT

## 2020-02-17 PROCEDURE — 94640 AIRWAY INHALATION TREATMENT: CPT

## 2020-02-17 PROCEDURE — 85730 THROMBOPLASTIN TIME PARTIAL: CPT

## 2020-02-17 PROCEDURE — C1769 GUIDE WIRE: HCPCS | Performed by: INTERNAL MEDICINE

## 2020-02-17 PROCEDURE — 25000003 PHARM REV CODE 250: Performed by: EMERGENCY MEDICINE

## 2020-02-17 PROCEDURE — 85025 COMPLETE CBC W/AUTO DIFF WBC: CPT

## 2020-02-17 PROCEDURE — 99152 PR MOD CONSCIOUS SEDATION, SAME PHYS, 5+ YRS, FIRST 15 MIN: ICD-10-PCS | Mod: ,,, | Performed by: INTERNAL MEDICINE

## 2020-02-17 PROCEDURE — 63600175 PHARM REV CODE 636 W HCPCS: Performed by: INTERNAL MEDICINE

## 2020-02-17 PROCEDURE — 21400001 HC TELEMETRY ROOM

## 2020-02-17 PROCEDURE — 99152 MOD SED SAME PHYS/QHP 5/>YRS: CPT | Performed by: INTERNAL MEDICINE

## 2020-02-17 PROCEDURE — C1760 CLOSURE DEV, VASC: HCPCS | Performed by: INTERNAL MEDICINE

## 2020-02-17 PROCEDURE — 93458 PR CATH PLACE/CORON ANGIO, IMG SUPER/INTERP,W LEFT HEART VENTRICULOGRAPHY: ICD-10-PCS | Mod: 26,,, | Performed by: INTERNAL MEDICINE

## 2020-02-17 PROCEDURE — 99152 MOD SED SAME PHYS/QHP 5/>YRS: CPT | Mod: ,,, | Performed by: INTERNAL MEDICINE

## 2020-02-17 PROCEDURE — 25000242 PHARM REV CODE 250 ALT 637 W/ HCPCS: Performed by: EMERGENCY MEDICINE

## 2020-02-17 PROCEDURE — 93458 L HRT ARTERY/VENTRICLE ANGIO: CPT | Performed by: INTERNAL MEDICINE

## 2020-02-17 PROCEDURE — 84484 ASSAY OF TROPONIN QUANT: CPT

## 2020-02-17 PROCEDURE — 63600175 PHARM REV CODE 636 W HCPCS: Performed by: EMERGENCY MEDICINE

## 2020-02-17 PROCEDURE — 80053 COMPREHEN METABOLIC PANEL: CPT

## 2020-02-17 PROCEDURE — 86901 BLOOD TYPING SEROLOGIC RH(D): CPT

## 2020-02-17 PROCEDURE — 99291 CRITICAL CARE FIRST HOUR: CPT | Mod: 25

## 2020-02-17 PROCEDURE — 25000003 PHARM REV CODE 250: Performed by: INTERNAL MEDICINE

## 2020-02-17 PROCEDURE — 85610 PROTHROMBIN TIME: CPT

## 2020-02-17 PROCEDURE — 25500020 PHARM REV CODE 255: Performed by: INTERNAL MEDICINE

## 2020-02-17 PROCEDURE — 93458 L HRT ARTERY/VENTRICLE ANGIO: CPT | Mod: 26,,, | Performed by: INTERNAL MEDICINE

## 2020-02-17 PROCEDURE — 83880 ASSAY OF NATRIURETIC PEPTIDE: CPT

## 2020-02-17 PROCEDURE — C1894 INTRO/SHEATH, NON-LASER: HCPCS | Performed by: INTERNAL MEDICINE

## 2020-02-17 RX ORDER — SODIUM CHLORIDE 0.9 % (FLUSH) 0.9 %
10 SYRINGE (ML) INJECTION
Status: DISCONTINUED | OUTPATIENT
Start: 2020-02-17 | End: 2020-02-18 | Stop reason: HOSPADM

## 2020-02-17 RX ORDER — FAMOTIDINE 20 MG/1
20 TABLET, FILM COATED ORAL 2 TIMES DAILY
Status: DISCONTINUED | OUTPATIENT
Start: 2020-02-17 | End: 2020-02-18 | Stop reason: HOSPADM

## 2020-02-17 RX ORDER — CLOPIDOGREL BISULFATE 75 MG/1
75 TABLET ORAL DAILY
Status: DISCONTINUED | OUTPATIENT
Start: 2020-02-18 | End: 2020-02-18 | Stop reason: HOSPADM

## 2020-02-17 RX ORDER — ONDANSETRON 2 MG/ML
4 INJECTION INTRAMUSCULAR; INTRAVENOUS EVERY 8 HOURS PRN
Status: DISCONTINUED | OUTPATIENT
Start: 2020-02-17 | End: 2020-02-18 | Stop reason: HOSPADM

## 2020-02-17 RX ORDER — ACETAMINOPHEN 325 MG/1
650 TABLET ORAL EVERY 8 HOURS PRN
Status: DISCONTINUED | OUTPATIENT
Start: 2020-02-17 | End: 2020-02-18 | Stop reason: HOSPADM

## 2020-02-17 RX ORDER — FENTANYL CITRATE 50 UG/ML
INJECTION, SOLUTION INTRAMUSCULAR; INTRAVENOUS
Status: DISCONTINUED | OUTPATIENT
Start: 2020-02-17 | End: 2020-02-17 | Stop reason: HOSPADM

## 2020-02-17 RX ORDER — NAPROXEN SODIUM 220 MG/1
162 TABLET, FILM COATED ORAL
Status: COMPLETED | OUTPATIENT
Start: 2020-02-17 | End: 2020-02-17

## 2020-02-17 RX ORDER — ATORVASTATIN CALCIUM 40 MG/1
40 TABLET, FILM COATED ORAL DAILY
Status: DISCONTINUED | OUTPATIENT
Start: 2020-02-17 | End: 2020-02-18 | Stop reason: HOSPADM

## 2020-02-17 RX ORDER — CLOPIDOGREL BISULFATE 75 MG/1
600 TABLET ORAL
Status: COMPLETED | OUTPATIENT
Start: 2020-02-17 | End: 2020-02-17

## 2020-02-17 RX ORDER — IODIXANOL 320 MG/ML
INJECTION, SOLUTION INTRAVASCULAR
Status: DISCONTINUED | OUTPATIENT
Start: 2020-02-17 | End: 2020-02-17 | Stop reason: HOSPADM

## 2020-02-17 RX ORDER — LIDOCAINE HYDROCHLORIDE 10 MG/ML
INJECTION, SOLUTION EPIDURAL; INFILTRATION; INTRACAUDAL; PERINEURAL
Status: DISCONTINUED | OUTPATIENT
Start: 2020-02-17 | End: 2020-02-17 | Stop reason: HOSPADM

## 2020-02-17 RX ORDER — HEPARIN SODIUM 1000 [USP'U]/ML
INJECTION, SOLUTION INTRAVENOUS; SUBCUTANEOUS
Status: DISCONTINUED | OUTPATIENT
Start: 2020-02-17 | End: 2020-02-17 | Stop reason: HOSPADM

## 2020-02-17 RX ORDER — CEFAZOLIN SODIUM 1 G/3ML
INJECTION, POWDER, FOR SOLUTION INTRAMUSCULAR; INTRAVENOUS
Status: DISCONTINUED | OUTPATIENT
Start: 2020-02-17 | End: 2020-02-17 | Stop reason: HOSPADM

## 2020-02-17 RX ORDER — POLYETHYLENE GLYCOL 3350 17 G/17G
17 POWDER, FOR SOLUTION ORAL DAILY
Status: DISCONTINUED | OUTPATIENT
Start: 2020-02-17 | End: 2020-02-18 | Stop reason: HOSPADM

## 2020-02-17 RX ORDER — ASPIRIN 81 MG/1
81 TABLET ORAL DAILY
Status: DISCONTINUED | OUTPATIENT
Start: 2020-02-18 | End: 2020-02-18 | Stop reason: HOSPADM

## 2020-02-17 RX ORDER — HEPARIN SODIUM,PORCINE/D5W 25000/250
12 INTRAVENOUS SOLUTION INTRAVENOUS CONTINUOUS
Status: DISCONTINUED | OUTPATIENT
Start: 2020-02-17 | End: 2020-02-17

## 2020-02-17 RX ORDER — ALBUTEROL SULFATE 2.5 MG/.5ML
2.5 SOLUTION RESPIRATORY (INHALATION) ONCE
Status: COMPLETED | OUTPATIENT
Start: 2020-02-17 | End: 2020-02-17

## 2020-02-17 RX ORDER — MORPHINE SULFATE 2 MG/ML
2 INJECTION, SOLUTION INTRAMUSCULAR; INTRAVENOUS EVERY 4 HOURS PRN
Status: DISCONTINUED | OUTPATIENT
Start: 2020-02-17 | End: 2020-02-18 | Stop reason: HOSPADM

## 2020-02-17 RX ORDER — HEPARIN SODIUM 200 [USP'U]/100ML
INJECTION, SOLUTION INTRAVENOUS
Status: DISCONTINUED | OUTPATIENT
Start: 2020-02-17 | End: 2020-02-18 | Stop reason: HOSPADM

## 2020-02-17 RX ORDER — DIPHENHYDRAMINE HYDROCHLORIDE 50 MG/ML
INJECTION INTRAMUSCULAR; INTRAVENOUS
Status: DISCONTINUED | OUTPATIENT
Start: 2020-02-17 | End: 2020-02-17 | Stop reason: HOSPADM

## 2020-02-17 RX ORDER — MIDAZOLAM HYDROCHLORIDE 1 MG/ML
INJECTION, SOLUTION INTRAMUSCULAR; INTRAVENOUS
Status: DISCONTINUED | OUTPATIENT
Start: 2020-02-17 | End: 2020-02-17 | Stop reason: HOSPADM

## 2020-02-17 RX ORDER — VERAPAMIL HYDROCHLORIDE 2.5 MG/ML
INJECTION, SOLUTION INTRAVENOUS
Status: DISCONTINUED | OUTPATIENT
Start: 2020-02-17 | End: 2020-02-17 | Stop reason: HOSPADM

## 2020-02-17 RX ORDER — TALC
6 POWDER (GRAM) TOPICAL NIGHTLY PRN
Status: DISCONTINUED | OUTPATIENT
Start: 2020-02-17 | End: 2020-02-18 | Stop reason: HOSPADM

## 2020-02-17 RX ORDER — IPRATROPIUM BROMIDE AND ALBUTEROL SULFATE 2.5; .5 MG/3ML; MG/3ML
3 SOLUTION RESPIRATORY (INHALATION)
Status: DISCONTINUED | OUTPATIENT
Start: 2020-02-17 | End: 2020-02-17

## 2020-02-17 RX ORDER — MORPHINE SULFATE 4 MG/ML
4 INJECTION, SOLUTION INTRAMUSCULAR; INTRAVENOUS EVERY 4 HOURS PRN
Status: DISCONTINUED | OUTPATIENT
Start: 2020-02-17 | End: 2020-02-18 | Stop reason: HOSPADM

## 2020-02-17 RX ADMIN — ALBUTEROL SULFATE 2.5 MG: 2.5 SOLUTION RESPIRATORY (INHALATION) at 04:02

## 2020-02-17 RX ADMIN — HEPARIN SODIUM 12 UNITS/KG/HR: 10000 INJECTION, SOLUTION INTRAVENOUS at 05:02

## 2020-02-17 RX ADMIN — SODIUM CHLORIDE 100.65 ML: 0.9 INJECTION, SOLUTION INTRAVENOUS at 02:02

## 2020-02-17 RX ADMIN — ASPIRIN 81 MG 162 MG: 81 TABLET ORAL at 05:02

## 2020-02-17 RX ADMIN — CLOPIDOGREL BISULFATE 600 MG: 75 TABLET ORAL at 05:02

## 2020-02-17 NOTE — H&P
[facility]  Cardiology  History and Physical     Patient Name: Anirudh David  MRN: [id number]  Admission Date: 2/17/2020  Code Status: Full Code   Attending Provider: Michael Moreno MD   Primary Care Physician: Meenakshi Reed MD  Principal Problem:NSTEMI (non-ST elevated myocardial infarction)    Patient information was obtained from patient and ER records.     Subjective:     Chief Complaint:  Chest pain, SOB     HPI:  Anirudh David is a 59 y.o male with no known chronic health problems who presented to the ED with cc of SOB. The patient states that he has been experiencing rhinorrhea and congestion for the past 2 weeks and it has since worsened. He denies any fever or chills but admits to smoking cigarettes. Patient reports chest pain which he attributes to his chest congestion. Denies any exertional symptoms. Patient rides a bike for transportation and denies any pain with this activity. He denies any MARI, edema, palpitations, syncope, dizziness. Troponin noted elevated at 0.2. EKG SR with ischemic changes noted. NPO for LHC. TTE pending.     Past Medical History:   Diagnosis Date    Dermatitis     Hypertension        Past Surgical History:   Procedure Laterality Date    HAND SURGERY      MANDIBLE FRACTURE SURGERY         Review of patient's allergies indicates:  No Known Allergies    No current facility-administered medications on file prior to encounter.      Current Outpatient Medications on File Prior to Encounter   Medication Sig    amlodipine (NORVASC) 5 MG tablet Take 1 tablet (5 mg total) by mouth once daily.    clobetasol 0.05% (TEMOVATE) 0.05 % Oint Apply topically 2 (two) times daily.    HYDROcodone-acetaminophen (NORCO) 5-325 mg per tablet Take 1 tablet by mouth every 4 (four) hours as needed for Pain.    ibuprofen (ADVIL,MOTRIN) 600 MG tablet Take 1 tablet (600 mg total) by mouth every 6 (six) hours as needed for Pain.     Family History     None        Tobacco Use     Smoking status: Current Every Day Smoker     Packs/day: 1.00     Types: Cigarettes    Smokeless tobacco: Never Used   Substance and Sexual Activity    Alcohol use: Yes     Alcohol/week: 6.0 standard drinks     Types: 6 Cans of beer per week     Comment: occ    Drug use: No    Sexual activity: Not on file     Review of Systems   Constitution: Negative for diaphoresis and fever.   HENT: Positive for congestion.    Eyes: Negative.    Cardiovascular: Positive for chest pain. Negative for dyspnea on exertion, irregular heartbeat, leg swelling, near-syncope, orthopnea, palpitations, paroxysmal nocturnal dyspnea and syncope.   Respiratory: Positive for cough and shortness of breath.    Endocrine: Negative.    Hematologic/Lymphatic: Negative.  Negative for bleeding problem. Does not bruise/bleed easily.   Skin: Negative.    Musculoskeletal: Positive for arthritis and myalgias.   Gastrointestinal: Negative.  Negative for hematemesis, hematochezia, melena, nausea and vomiting.   Genitourinary: Negative.    Neurological: Negative.    Psychiatric/Behavioral: Negative.    Allergic/Immunologic: Negative.      Objective:     Vital Signs (Most Recent):  Temp: 97.6 °F (36.4 °C) (02/17/20 0813)  Pulse: 80 (02/17/20 0825)  Resp: 18 (02/17/20 0813)  BP: (!) 146/104 (02/17/20 0814)  SpO2: 99 % (02/17/20 0813) Vital Signs (24h Range):  Temp:  [97.6 °F (36.4 °C)-97.7 °F (36.5 °C)] 97.6 °F (36.4 °C)  Pulse:  [78-84] 80  Resp:  [16-25] 18  SpO2:  [93 %-99 %] 99 %  BP: (140-149)/() 146/104     Weight: 67.4 kg (148 lb 9.4 oz)  Body mass index is 19.08 kg/m².    SpO2: 99 %  O2 Device (Oxygen Therapy): nasal cannula      Intake/Output Summary (Last 24 hours) at 2/17/2020 1011  Last data filed at 2/17/2020 0900  Gross per 24 hour   Intake 0 ml   Output --   Net 0 ml       Lines/Drains/Airways     Peripheral Intravenous Line                 Peripheral IV - Single Lumen 02/17/20 0436 20 G Left Forearm less than 1 day         Peripheral  IV - Single Lumen 02/17/20 0730 18 G Right Forearm less than 1 day                Physical Exam   Constitutional: He is oriented to person, place, and time. He appears well-developed and well-nourished. No distress.   HENT:   Head: Normocephalic and atraumatic.   Eyes: Right eye exhibits no discharge. Left eye exhibits no discharge.   Neck: No JVD present.   Cardiovascular: Normal rate and regular rhythm. Exam reveals no gallop and no friction rub.   No murmur heard.  Pulmonary/Chest: Effort normal. No respiratory distress. He has wheezes. He has no rales. He exhibits no tenderness.   Abdominal: Soft. Bowel sounds are normal.   Musculoskeletal: He exhibits no edema.   Neurological: He is alert and oriented to person, place, and time.   Skin: Skin is warm and dry. He is not diaphoretic.   Psychiatric: He has a normal mood and affect. His behavior is normal. Judgment and thought content normal.       Significant Labs:   BMP:   Recent Labs   Lab 02/17/20  0435   GLU 92      K 4.3      CO2 22*   BUN 18   CREATININE 1.3   CALCIUM 8.5*   , CMP   Recent Labs   Lab 02/17/20  0435      K 4.3      CO2 22*   GLU 92   BUN 18   CREATININE 1.3   CALCIUM 8.5*   PROT 7.0   ALBUMIN 3.3*   BILITOT 0.5   ALKPHOS 100   AST 43*   ALT 27   ANIONGAP 10   ESTGFRAFRICA >60   EGFRNONAA >60   , CBC   Recent Labs   Lab 02/17/20  0435   WBC 5.12   HGB 12.1*   HCT 34.6*      , INR   Recent Labs   Lab 02/17/20 0435   INR 1.1   , Lipid Panel No results for input(s): CHOL, HDL, LDLCALC, TRIG, CHOLHDL in the last 48 hours., Troponin   Recent Labs   Lab 02/17/20  0435 02/17/20  0740   TROPONINI 0.257* 0.271*    and All pertinent lab results from the last 24 hours have been reviewed.    Significant Imaging: Echocardiogram: Transthoracic echo (TTE) complete (Cupid Only): No results found for this or any previous visit.    Assessment and Plan:     * NSTEMI (non-ST elevated myocardial infarction)  Troponin 0.2  EKG SR with  ischemic changes   Chest pain concerning for ischemic etiology   Loaded with asa and Plavix  On Heparin gtt  NPO for LHC today  TTE pending         VTE Risk Mitigation (From admission, onward)         Ordered     heparin 25,000 units in dextrose 5% 250 mL (100 units/mL) infusion LOW INTENSITY nomogram - [facility]  Continuous     Question:  Heparin Infusion Adjustment (DO NOT MODIFY ANSWER)  Answer:  [email or web address]\epic\Images\Pharmacy\HeparinInfusions\heparin LOW INTENSITY nomogram for [facility] [id number].pdf    02/17/20 0529     heparin 25,000 units in dextrose 5% (100 units/ml) IV bolus from bag - ADDITIONAL PRN BOLUS - 60 units/kg (max bolus 4000 units)  As needed (PRN)     Question:  Heparin Infusion Adjustment (DO NOT MODIFY ANSWER)  Answer:  [email or web address]\epic\Images\Pharmacy\HeparinInfusions\heparin LOW INTENSITY nomogram for [facility] [id number].pdf    02/17/20 0529     heparin 25,000 units in dextrose 5% (100 units/ml) IV bolus from bag - ADDITIONAL PRN BOLUS - 30 units/kg (max bolus 4000 units)  As needed (PRN)     Question:  Heparin Infusion Adjustment (DO NOT MODIFY ANSWER)  Answer:  [email or web address]\epic\Images\Pharmacy\HeparinInfusions\heparin LOW INTENSITY nomogram for [facility] [id number].pdf    02/17/20 0529     Place ALYSSA hose  Until discontinued      02/17/20 0537     IP VTE LOW RISK PATIENT  Once      02/17/20 0537                Kiet Shine, DANIEL  Cardiology   [facility]

## 2020-02-17 NOTE — SUBJECTIVE & OBJECTIVE
Past Medical History:   Diagnosis Date    Dermatitis     Hypertension        Past Surgical History:   Procedure Laterality Date    HAND SURGERY      MANDIBLE FRACTURE SURGERY         Review of patient's allergies indicates:  No Known Allergies    No current facility-administered medications on file prior to encounter.      Current Outpatient Medications on File Prior to Encounter   Medication Sig    amlodipine (NORVASC) 5 MG tablet Take 1 tablet (5 mg total) by mouth once daily.    clobetasol 0.05% (TEMOVATE) 0.05 % Oint Apply topically 2 (two) times daily.    HYDROcodone-acetaminophen (NORCO) 5-325 mg per tablet Take 1 tablet by mouth every 4 (four) hours as needed for Pain.    ibuprofen (ADVIL,MOTRIN) 600 MG tablet Take 1 tablet (600 mg total) by mouth every 6 (six) hours as needed for Pain.     Family History     None        Tobacco Use    Smoking status: Current Every Day Smoker     Packs/day: 1.00     Types: Cigarettes    Smokeless tobacco: Never Used   Substance and Sexual Activity    Alcohol use: Yes     Alcohol/week: 6.0 standard drinks     Types: 6 Cans of beer per week     Comment: occ    Drug use: No    Sexual activity: Not on file     Review of Systems   Constitution: Negative for diaphoresis and fever.   HENT: Positive for congestion.    Eyes: Negative.    Cardiovascular: Positive for chest pain. Negative for dyspnea on exertion, irregular heartbeat, leg swelling, near-syncope, orthopnea, palpitations, paroxysmal nocturnal dyspnea and syncope.   Respiratory: Positive for cough and shortness of breath.    Endocrine: Negative.    Hematologic/Lymphatic: Negative.  Negative for bleeding problem. Does not bruise/bleed easily.   Skin: Negative.    Musculoskeletal: Positive for arthritis and myalgias.   Gastrointestinal: Negative.  Negative for hematemesis, hematochezia, melena, nausea and vomiting.   Genitourinary: Negative.    Neurological: Negative.    Psychiatric/Behavioral: Negative.     Allergic/Immunologic: Negative.      Objective:     Vital Signs (Most Recent):  Temp: 97.6 °F (36.4 °C) (02/17/20 0813)  Pulse: 80 (02/17/20 0825)  Resp: 18 (02/17/20 0813)  BP: (!) 146/104 (02/17/20 0814)  SpO2: 99 % (02/17/20 0813) Vital Signs (24h Range):  Temp:  [97.6 °F (36.4 °C)-97.7 °F (36.5 °C)] 97.6 °F (36.4 °C)  Pulse:  [78-84] 80  Resp:  [16-25] 18  SpO2:  [93 %-99 %] 99 %  BP: (140-149)/() 146/104     Weight: 67.4 kg (148 lb 9.4 oz)  Body mass index is 19.08 kg/m².    SpO2: 99 %  O2 Device (Oxygen Therapy): nasal cannula      Intake/Output Summary (Last 24 hours) at 2/17/2020 1011  Last data filed at 2/17/2020 0900  Gross per 24 hour   Intake 0 ml   Output --   Net 0 ml       Lines/Drains/Airways     Peripheral Intravenous Line                 Peripheral IV - Single Lumen 02/17/20 0436 20 G Left Forearm less than 1 day         Peripheral IV - Single Lumen 02/17/20 0730 18 G Right Forearm less than 1 day                Physical Exam   Constitutional: He is oriented to person, place, and time. He appears well-developed and well-nourished. No distress.   HENT:   Head: Normocephalic and atraumatic.   Eyes: Right eye exhibits no discharge. Left eye exhibits no discharge.   Neck: No JVD present.   Cardiovascular: Normal rate and regular rhythm. Exam reveals no gallop and no friction rub.   No murmur heard.  Pulmonary/Chest: Effort normal. No respiratory distress. He has wheezes. He has no rales. He exhibits no tenderness.   Abdominal: Soft. Bowel sounds are normal.   Musculoskeletal: He exhibits no edema.   Neurological: He is alert and oriented to person, place, and time.   Skin: Skin is warm and dry. He is not diaphoretic.   Psychiatric: He has a normal mood and affect. His behavior is normal. Judgment and thought content normal.       Significant Labs:   BMP:   Recent Labs   Lab 02/17/20  0435   GLU 92      K 4.3      CO2 22*   BUN 18   CREATININE 1.3   CALCIUM 8.5*   , CMP   Recent Labs    Lab 02/17/20  0435      K 4.3      CO2 22*   GLU 92   BUN 18   CREATININE 1.3   CALCIUM 8.5*   PROT 7.0   ALBUMIN 3.3*   BILITOT 0.5   ALKPHOS 100   AST 43*   ALT 27   ANIONGAP 10   ESTGFRAFRICA >60   EGFRNONAA >60   , CBC   Recent Labs   Lab 02/17/20  0435   WBC 5.12   HGB 12.1*   HCT 34.6*      , INR   Recent Labs   Lab 02/17/20  0435   INR 1.1   , Lipid Panel No results for input(s): CHOL, HDL, LDLCALC, TRIG, CHOLHDL in the last 48 hours., Troponin   Recent Labs   Lab 02/17/20  0435 02/17/20  0740   TROPONINI 0.257* 0.271*    and All pertinent lab results from the last 24 hours have been reviewed.    Significant Imaging: Echocardiogram: Transthoracic echo (TTE) complete (Cupid Only): No results found for this or any previous visit.

## 2020-02-17 NOTE — HPI
Anirudh David is a 59 y.o male with no known chronic health problems who presented to the ED with cc of SOB. The patient states that he has been experiencing rhinorrhea and congestion for the past 2 weeks and it has since worsened. He denies any fever or chills but admits to smoking cigarettes. Patient reports chest pain which he attributes to his chest congestion. Denies any exertional symptoms. Patient rides a bike for transportation and denies any pain with this activity. He denies any MARI, edema, palpitations, syncope, dizziness.  Patient admits to regular cocaine use and ETOH use. Troponin noted elevated at 0.2. EKG SR with ischemic changes noted.  NPO for LHC. TTE pending.

## 2020-02-17 NOTE — ASSESSMENT & PLAN NOTE
Troponin 0.2  EKG SR with ischemic changes   Chest pain concerning for ischemic etiology   Loaded with asa and Plavix  On Heparin gtt  NPO for LHC today  TTE pending

## 2020-02-17 NOTE — Clinical Note
Catheter is inserted into the ostium   right coronary artery. Angiography performed of the right coronary arteries in multiple views. Angiography performed via hand injection with 15 mL of contrast.
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
## 2020-02-17 NOTE — ED PROVIDER NOTES
Encounter Date: 2/17/2020    SCRIBE #1 NOTE: I, Kate Tovar, am scribing for, and in the presence of,  Dr. Gómez. I have scribed the entire note.       History     Chief Complaint   Patient presents with    Shortness of Breath     59y M ambulatory to ED with c/o SOB x2 weeks and congestion     The patient is a 59 y.o male presenting to the ED due to SOB. The patient states that he has been experiencing rhinorrhea and congestion for the past 2 weeks and it has since worsened. He denies any fever or chills but admits to smoking cigarettes and has a PMHx of HTN. He denies any other medical problems.     The history is provided by the patient.     Review of patient's allergies indicates:  No Known Allergies  Past Medical History:   Diagnosis Date    Dermatitis     Hypertension      Past Surgical History:   Procedure Laterality Date    HAND SURGERY      MANDIBLE FRACTURE SURGERY       No family history on file.  Social History     Tobacco Use    Smoking status: Current Every Day Smoker     Packs/day: 1.00     Types: Cigarettes    Smokeless tobacco: Never Used   Substance Use Topics    Alcohol use: Yes     Alcohol/week: 6.0 standard drinks     Types: 6 Cans of beer per week     Comment: occ    Drug use: No     Review of Systems   HENT: Positive for congestion and rhinorrhea.    Respiratory: Positive for shortness of breath.    All other systems reviewed and are negative.      Physical Exam     Initial Vitals [02/17/20 0357]   BP Pulse Resp Temp SpO2   (!) 140/91 79 18 97.7 °F (36.5 °C) 95 %      MAP       --         Physical Exam    Nursing note and vitals reviewed.  Constitutional: He appears well-developed and well-nourished. He is not diaphoretic. No distress.   HENT:   Head: Normocephalic and atraumatic.   Mouth/Throat: Oropharynx is clear and moist.   Eyes: EOM are normal. Pupils are equal, round, and reactive to light.   Neck: No tracheal deviation present.   Cardiovascular: Normal rate, regular rhythm,  normal heart sounds and intact distal pulses.   Pulmonary/Chest: No stridor. No respiratory distress. He has rhonchi.   Diminished breath sounds bilaterally with rhonchi   Abdominal: Soft. He exhibits no distension and no mass. There is no tenderness.   Musculoskeletal: Normal range of motion. He exhibits no edema.   Neurological: He is alert and oriented to person, place, and time. No cranial nerve deficit or sensory deficit.   Skin: Skin is warm and dry. Capillary refill takes less than 2 seconds. No rash noted.   Psychiatric: He has a normal mood and affect. His behavior is normal. Thought content normal.         ED Course   Critical Care  Date/Time: 2/17/2020 5:40 AM  Performed by: Romeo Gómez MD  Authorized by: Romeo Gómez MD   Total critical care time (exclusive of procedural time) : 40 minutes  Critical care was necessary to treat or prevent imminent or life-threatening deterioration of the following conditions: cardiac failure (NSTEMI).  Critical care was time spent personally by me on the following activities: discussions with consultants, interpretation of cardiac output measurements, evaluation of patient's response to treatment, examination of patient, re-evaluation of patient's condition, review of old charts, ordering and review of laboratory studies, ordering and review of radiographic studies and ordering and performing treatments and interventions.        Labs Reviewed   CBC W/ AUTO DIFFERENTIAL - Abnormal; Notable for the following components:       Result Value    RBC 3.70 (*)     Hemoglobin 12.1 (*)     Hematocrit 34.6 (*)     Mean Corpuscular Hemoglobin 32.7 (*)     All other components within normal limits   COMPREHENSIVE METABOLIC PANEL - Abnormal; Notable for the following components:    CO2 22 (*)     Calcium 8.5 (*)     Albumin 3.3 (*)     AST 43 (*)     All other components within normal limits   TROPONIN I - Abnormal; Notable for the following components:    Troponin I 0.257  "(*)     All other components within normal limits   B-TYPE NATRIURETIC PEPTIDE   APTT   PROTIME-INR   TYPE & SCREEN          Imaging Results          X-Ray Chest PA And Lateral (Final result)  Result time 02/17/20 04:59:21    Final result by Neil Hair MD (02/17/20 04:59:21)                 Impression:      Borderline cardiomegaly with mild perihilar interstitial edema and possible trace pleural effusions.  Suggest correlation for any clinical findings of CHF.      Electronically signed by: Neil Hair MD  Date:    02/17/2020  Time:    04:59             Narrative:    EXAMINATION:  XR CHEST PA AND LATERAL    CLINICAL HISTORY:  Provided history is "  Cough".    TECHNIQUE:  Frontal and lateral views of the chest were performed.    COMPARISON:  08/24/2011.    FINDINGS:  Cardiac wires overlie the chest.  Cardiomediastinal silhouette is mildly enlarged.  Atherosclerotic calcifications overlie the aortic arch.  There is central vascular congestion and coarsened perihilar lung markings suggestive of mild interstitial edema.  No confluent area of consolidation.  Possible trace pleural effusions.  No pneumothorax.                                 Medical Decision Making:   Initial Assessment:   This is a 59-year-old male who reports a history of hypertension, noncompliant with medications, current daily smoker, who presents the ER for evaluation of cough and shortness of breath, onset 2 weeks.  Reported symptoms worsened today, when he tried lying flat he had difficulty breathing.  He reportedly felt as if he was being suffocated.  He notes he has moderate nasal congestion who believes it is related to that.  Reports difficulty with daily activities, biking, which he believes is related to the nasal congestion, causing cough and shortness of breath. No fever, chills, nausea vomiting or weakness.  Overall well appearing no acute distress, there is some diminished breath sounds bilaterally and some rhonchi, but no " significant wheezing no acute respiratory distress. Differential includes allergies, sinusitis, postnasal drip, pneumonia, COPD/reactive airway disease, less likely ACS ,nstemi, STEMI.  Will give some breathing treatments, will obtain x-ray, and screening EKG will reassess.  Clinical Tests:   Radiological Study: Ordered and Reviewed  Medical Tests: Ordered and Reviewed            Scribe Attestation:   Scribe #1: I performed the above scribed service and the documentation accurately describes the services I performed. I attest to the accuracy of the note.    Attending Attestation:           Physician Attestation for Scribe:  Physician Attestation Statement for Scribe #1: I, Dr. Gómez, reviewed documentation, as scribed by Kate Tovar in my presence, and it is both accurate and complete.                 ED Course as of Feb 17 0542   Mon Feb 17, 2020   0425 0420 EKG performed, machine read as anterior STEMI I immediately went to patient, no significant chest pain at this time, evaluation of the leads revealed anterior leads were in the wrong place, changed lead position to proper place, repeat EKG done at 4:25 a.m. reveals sinus rhythm 83 beats per minute, LVH noted, new T-wave inversions noted in inferior leads, previous EKG was 7 years ago, no STEMI.  Unsure if these are new changes, or related to LVH.  Will obtain blood work and troponin reassess.    [SE]   0532 Resting comfortablyIn bed, no acute distress, chest pain-free at this time.  First set troponin came back at 0.257, given this scenario of decreased exercise tolerance, shortness of breath with this the abnormal chest pain, patient likely suffering NSTEMI.  I discussed with Cardiology on-call Dr. Moreno, requested heparin, Plavix, aspirin already given.  Will admit to his service, and plan for cath.  Discussed with patient who understands and agrees with plan.    [SE]      ED Course User Index  [SE] Romeo Gómez MD                Clinical  Impression:     1. NSTEMI (non-ST elevated myocardial infarction)    2. SOB (shortness of breath)    3. Cough            Disposition:   Disposition: Admitted  Condition: Gunjan Gómez MD  02/17/20 0542

## 2020-02-17 NOTE — PLAN OF CARE
Rounds completed on pt.  All questions addressed.  Pt's sister will provide transportation home.  Bedside nurse to discuss d/c medications.  Discussed importance to attend all f/u appts and take medications as prescribed.  Verbalized understanding.    Future Appointments   Date Time Provider Department Center   2/26/2020  2:30 PM Nafisa Sharif MD [facility]        02/17/20 1616   Final Note   Assessment Type Final Discharge Note   Anticipated Discharge Disposition Home   Hospital Follow Up  Appt(s) scheduled? Yes   Discharge plans and expectations educations in teach back method with documentation complete? Yes   Right Care Referral Info   Post Acute Recommendation No Care     Vipul Castaneda RN,   [phone]

## 2020-02-17 NOTE — ED NOTES
Complaining of congestion x 2 weeks.  States his nose will not stop running and states he has been SOB.  States mucus is clear.

## 2020-02-17 NOTE — PLAN OF CARE
1450  Patient transferred to floor 466 on cardiac monitor.  VSS. No c/o pain.   Patient attached to tele monitor upon arrival in room. Right groin tegaderm site CDI. No bleeding or hematoma noted, +2 berto radial pulses and pedal pulses reviewed with POONAM Peñaloza at bedside..  All questions answered.  Patient care assumed by Anabela.

## 2020-02-17 NOTE — NURSING
Pt back from cath lab and following post cath instructions.. Noted dressing in place to right groin. No bleeding to dressing or hematoma noted. Pts post cath recovery is done. Discussed pts discharge appts and meds with emphasis on making sure to go to all follow up appts..Removed tele box and IV with catheter intact. Pt denies discomfort at this time.Sister here to  pt.

## 2020-02-17 NOTE — ED NOTES
Patient identifiers for Anirudh David checked and correct.  LOC: The patient is awake, alert and aware of environment with an appropriate affect, the patient is oriented x 3 and speaking appropriately.  APPEARANCE: Patient resting comfortably and in no acute distress, patient is clean and well groomed, patient's clothing are properly fastened.  SKIN: The skin is warm and dry, patient has normal skin turgor and moist mucus membranes.  Right arm in cast.  MUSKULOSKELETAL: Patient moving all extremities well, no obvious swelling or deformities noted.  RESPIRATORY: Airway is open and patent, respirations are spontaneous, patient has a normal effort and rate.

## 2020-02-17 NOTE — PROCEDURES
": Tomy Patterson MD  Pre-procedure diagnosis: NSTEMI  Post-procedure diagnosis: severe MR    Post Procedure Note: [facility]    The pt was brought to the cath lab and under sterile technique, RCFA access was obtained without difficulty. Images were obtained in multiple views and normal coronaries noted with severe 4+ MR with severely enlarged LA and EF ~45%. Please see full report for details. The pt tolerated the procedure well without complications. Attempt at Mynx closure unsuccessful; manual pressure held with successful hemostasis.     Vitals:    02/17/20 0825 02/17/20 1147 02/17/20 1250 02/17/20 1300   BP:  (!) 138/101 (!) 150/103 (!) 143/102   BP Location:  Left arm Left arm Left leg   Patient Position:  Lying Lying Lying   Pulse: 80 77  77   Resp:  20  13   Temp:  97.8 °F (36.6 °C)     TempSrc:  Oral     SpO2:  95%  100%   Weight: 67.1 kg (148 lb)      Height: 6' 2" (1.88 m)            Gen: NAD  Ext: 2+ fem pulse, no evidence of hematoma  Estimated blood loss: < 50 cc    Plan:  -Post cath care per protocol  -OMT for CHF  -Consider surgical eval of MVr/MVR as outpatient    "

## 2020-02-17 NOTE — PLAN OF CARE
1250   pt transferred to recovery cath lab, bay 3 via stretcher, side-rails up x2.  Pt connected to cardiac monitor.  Vital signs stable.  No complaints of pain.  Right groin site covered with gauze/tegaderm, intact/dry with no bleeding or hematoma.   Bilateral pedal pulses, and bilateral radial pulses palpable, 2+.  Skin warm to touch, normal in color.  Neuro intact:  Sleepy, but arrousable.  Will continue to monitor patient.

## 2020-02-18 LAB — CATH EF QUANTITATIVE: 45 %

## 2020-02-18 NOTE — DISCHARGE SUMMARY
[facility]  Cardiology  Discharge Summary      Patient Name: Anirudh David  MRN: [id number]  Admission Date: 2/17/2020  Hospital Length of Stay: 1 days  Discharge Date and Time: 2/18/2020  6:43 AM  Attending Physician: No att. providers found    Discharging Provider: Kiet Shine NP  Primary Care Physician: Meenakshi Reed MD    HPI:   Anirudh David is a 59 y.o male with no known chronic health problems who presented to the ED with cc of SOB. The patient states that he has been experiencing rhinorrhea and congestion for the past 2 weeks and it has since worsened. He denies any fever or chills but admits to smoking cigarettes. Patient reports chest pain which he attributes to his chest congestion. Denies any exertional symptoms. Patient rides a bike for transportation and denies any pain with this activity. He denies any MARI, edema, palpitations, syncope, dizziness.  Patient admits to regular cocaine use and ETOH use. Troponin noted elevated at 0.2. EKG SR with ischemic changes noted.  NPO for LHC. TTE pending.     Procedure(s) (LRB):  Left heart cath (N/A)  Ventriculogram, Left  ANGIOGRAM, CORONARY ARTERY (N/A)  Placement of Closure Device     Indwelling Lines/Drains at time of discharge:  Lines/Drains/Airways     None                 Hospital Course:  02/17/2020 Post Procedure Note: [facility]     The pt was brought to the cath lab and under sterile technique, RCFA access was obtained without difficulty. Images were obtained in multiple views and normal coronaries noted with severe 4+ MR with severely enlarged LA and EF ~45%. Please see full report for details. The pt tolerated the procedure well without complications. Attempt at Mynx closure unsuccessful; manual pressure held with successful hemostasis.      Vitals          Vitals:     02/17/20 0825 02/17/20 1147 02/17/20 1250 02/17/20 1300   BP:   (!) 138/101 (!) 150/103 (!) 143/102   BP Location:   Left arm Left arm Left leg   Patient  "Position:   Lying Lying Lying   Pulse: 80 77   77   Resp:   20   13   Temp:   97.8 °F (36.6 °C)       TempSrc:   Oral       SpO2:   95%   100%   Weight: 67.1 kg (148 lb)         Height: 6' 2" (1.88 m)                     Gen: NAD  Ext: 2+ fem pulse, no evidence of hematoma  Estimated blood loss: < 50 cc     Plan:  -Post cath care per protocol  -OMT for CHF  -Consider surgical eval of MVr/MVR as outpatient        Consults:     Significant Diagnostic Studies: Labs:   BMP:   Recent Labs   Lab 02/17/20  0435   GLU 92      K 4.3      CO2 22*   BUN 18   CREATININE 1.3   CALCIUM 8.5*   , CMP   Recent Labs   Lab 02/17/20  0435      K 4.3      CO2 22*   GLU 92   BUN 18   CREATININE 1.3   CALCIUM 8.5*   PROT 7.0   ALBUMIN 3.3*   BILITOT 0.5   ALKPHOS 100   AST 43*   ALT 27   ANIONGAP 10   ESTGFRAFRICA >60   EGFRNONAA >60   , CBC   Recent Labs   Lab 02/17/20  0435   WBC 5.12   HGB 12.1*   HCT 34.6*      , Lipid Panel No results found for: CHOL, HDL, LDLCALC, TRIG, CHOLHDL, Troponin   Recent Labs   Lab 02/17/20  0740   TROPONINI 0.271*   , A1C: No results for input(s): HGBA1C in the last 4320 hours. and All labs within the past 24 hours have been reviewed  Cardiac Graphics: Echocardiogram:   Transthoracic echo (TTE) complete (Cupid Only):   Results for orders placed or performed during the hospital encounter of 02/17/20   Echo Color Flow Doppler? Yes   Result Value Ref Range    BSA 1.87 m2    LA WIDTH 6.29 cm    LVIDD 5.68 3.5 - 6.0 cm    IVS 0.98 0.6 - 1.1 cm    PW 1.07 0.6 - 1.1 cm    Ao root annulus 2.84 cm    LVIDS 4.48 (A) 2.1 - 4.0 cm    FS 21 28 - 44 %    LA volume 150.98 cm3    STJ 2.55 cm    Ascending aorta 2.84 cm    LV mass 232.43 g    LA size 4.28 cm    RVDD 2.78 cm    TAPSE 1.52 cm    Left Ventricle Relative Wall Thickness 0.38 cm    AV mean gradient 5 mmHg    AV valve area 2.29 cm2    AV Velocity Ratio 0.51     AV index (prosthetic) 0.59     E/A ratio 3.16     E wave decelartion time " 271.14 msec    Pulm vein S/D ratio 0.92     LVOT diameter 2.22 cm    LVOT area 3.9 cm2    LVOT peak praful 0.70 m/s    LVOT peak VTI 12.41 cm    Ao peak praful 1.38 m/s    Ao VTI 20.94 cm    LVOT stroke volume 48.01 cm3    AV peak gradient 8 mmHg    MV Peak E Praful 1.20 m/s    TR Max Praful 3.23 m/s    MV Peak A Praful 0.38 m/s    PV Peak S Praful 0.46 m/s    PV Peak D Praful 0.50 m/s    LV Systolic Volume 91.36 mL    LV Systolic Volume Index 47.8 mL/m2    LV Diastolic Volume 158.56 mL    LV Diastolic Volume Index 82.93 mL/m2    LA Volume Index 79.0 mL/m2    LV Mass Index 122 g/m2    RA Major Axis 6.23 cm    Left Atrium Minor Axis 6.39 cm    Left Atrium Major Axis 6.82 cm    Triscuspid Valve Regurgitation Peak Gradient 42 mmHg    RA Width 5.42 cm    Right Atrial Pressure (from IVC) 8 mmHg    TV rest pulmonary artery pressure 50 mmHg    Narrative    · Low normal left ventricular systolic function. The estimated ejection   fraction is 45-50%.  · Mild left ventricular enlargement.  · Eccentric left ventricular hypertrophy.  · Grade III (severe) left ventricular diastolic dysfunction consistent   with restrictive physiology.  · No wall motion abnormalities.  · Normal right ventricular systolic function.  · Pulmonary hypertension present.  · The estimated PA systolic pressure is 50 mmHg.  · Intermediate central venous pressure (8 mmHg).  · Severe left atrial enlargement.  · Moderate right atrial enlargement.  · Moderate mitral sclerosis: myxomatous  · Severe mitral regurgitation. 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 Would consider JOSE A and further valve disease workup          Pending Diagnostic Studies:     None          Final Active Diagnoses:    Diagnosis Date Noted POA    PRINCIPAL PROBLEM:  NSTEMI (non-ST elevated myocardial infarction) [I21.4] 02/17/2020 Yes      Problems Resolved During this Admission:     No new Assessment & Plan notes have been filed under this hospital service since the last note was  generated.  Service: Cardiology      Discharged Condition: good    Disposition: Home or Self Care    Follow Up:    Patient Instructions:      Diet Adult Regular     Lifting restrictions   Scheduling Instructions: No lifting over 10 pounds with left arm for 1 week     Notify your health care provider if you experience any of the following:  severe uncontrolled pain     Notify your health care provider if you experience any of the following:  redness, tenderness, or signs of infection (pain, swelling, redness, odor or green/yellow discharge around incision site)     Notify your health care provider if you experience any of the following:  difficulty breathing or increased cough     Activity as tolerated     Shower on day dressing removed (No bath)     Medications:  Reconciled Home Medications:      Medication List      CONTINUE taking these medications    amLODIPine 5 MG tablet  Commonly known as:  NORVASC  Take 1 tablet (5 mg total) by mouth once daily.     clobetasol 0.05% 0.05 % Oint  Commonly known as:  TEMOVATE  Apply topically 2 (two) times daily.     HYDROcodone-acetaminophen 5-325 mg per tablet  Commonly known as:  NORCO  Take 1 tablet by mouth every 4 (four) hours as needed for Pain.     ibuprofen 600 MG tablet  Commonly known as:  ADVIL,MOTRIN  Take 1 tablet (600 mg total) by mouth every 6 (six) hours as needed for Pain.            Time spent on the discharge of patient: 45 minutes    Kiet Shine NP  Cardiology  [facility]

## 2020-02-20 ENCOUNTER — HOSPITAL ENCOUNTER (INPATIENT)
Facility: HOSPITAL | Age: 60
LOS: 1 days | Discharge: HOME | DRG: 193 | End: 2020-02-21
Attending: EMERGENCY MEDICINE | Admitting: FAMILY MEDICINE
Payer: MEDICAID

## 2020-02-20 DIAGNOSIS — R07.9 CHEST PAIN: ICD-10-CM

## 2020-02-20 DIAGNOSIS — R05.9 COUGH: ICD-10-CM

## 2020-02-20 DIAGNOSIS — J18.9 PNEUMONIA OF BOTH LOWER LOBES DUE TO INFECTIOUS ORGANISM: Primary | ICD-10-CM

## 2020-02-20 DIAGNOSIS — I50.9 ACUTE ON CHRONIC CONGESTIVE HEART FAILURE, UNSPECIFIED HEART FAILURE TYPE: ICD-10-CM

## 2020-02-20 PROBLEM — J81.0 ACUTE PULMONARY EDEMA: Status: ACTIVE | Noted: 2020-02-20

## 2020-02-20 LAB
ALBUMIN SERPL BCP-MCNC: 3.6 G/DL (ref 3.5–5.2)
ALP SERPL-CCNC: 92 U/L (ref 55–135)
ALT SERPL W/O P-5'-P-CCNC: 28 U/L (ref 10–44)
ANION GAP SERPL CALC-SCNC: 11 MMOL/L (ref 8–16)
AST SERPL-CCNC: 47 U/L (ref 10–40)
BASOPHILS # BLD AUTO: 0.02 K/UL (ref 0–0.2)
BASOPHILS NFR BLD: 0.4 % (ref 0–1.9)
BILIRUB SERPL-MCNC: 0.9 MG/DL (ref 0.1–1)
BNP SERPL-MCNC: 1702 PG/ML (ref 0–99)
BUN SERPL-MCNC: 14 MG/DL (ref 6–20)
CALCIUM SERPL-MCNC: 8.8 MG/DL (ref 8.7–10.5)
CHLORIDE SERPL-SCNC: 107 MMOL/L (ref 95–110)
CO2 SERPL-SCNC: 19 MMOL/L (ref 23–29)
CREAT SERPL-MCNC: 1.1 MG/DL (ref 0.5–1.4)
DIFFERENTIAL METHOD: ABNORMAL
EOSINOPHIL # BLD AUTO: 0 K/UL (ref 0–0.5)
EOSINOPHIL NFR BLD: 0.2 % (ref 0–8)
ERYTHROCYTE [DISTWIDTH] IN BLOOD BY AUTOMATED COUNT: 12.1 % (ref 11.5–14.5)
EST. GFR  (AFRICAN AMERICAN): >60 ML/MIN/1.73 M^2
EST. GFR  (NON AFRICAN AMERICAN): >60 ML/MIN/1.73 M^2
GLUCOSE SERPL-MCNC: 111 MG/DL (ref 70–110)
HCT VFR BLD AUTO: 38.4 % (ref 40–54)
HGB BLD-MCNC: 13 G/DL (ref 14–18)
IMM GRANULOCYTES # BLD AUTO: 0.01 K/UL (ref 0–0.04)
IMM GRANULOCYTES NFR BLD AUTO: 0.2 % (ref 0–0.5)
INFLUENZA A, MOLECULAR: NEGATIVE
INFLUENZA B, MOLECULAR: NEGATIVE
LYMPHOCYTES # BLD AUTO: 1.2 K/UL (ref 1–4.8)
LYMPHOCYTES NFR BLD: 24.8 % (ref 18–48)
MCH RBC QN AUTO: 31.1 PG (ref 27–31)
MCHC RBC AUTO-ENTMCNC: 33.9 G/DL (ref 32–36)
MCV RBC AUTO: 92 FL (ref 82–98)
MONOCYTES # BLD AUTO: 0.6 K/UL (ref 0.3–1)
MONOCYTES NFR BLD: 12.6 % (ref 4–15)
NEUTROPHILS # BLD AUTO: 2.9 K/UL (ref 1.8–7.7)
NEUTROPHILS NFR BLD: 61.8 % (ref 38–73)
NRBC BLD-RTO: 0 /100 WBC
PLATELET # BLD AUTO: 256 K/UL (ref 150–350)
PMV BLD AUTO: 10.7 FL (ref 9.2–12.9)
POTASSIUM SERPL-SCNC: 4.8 MMOL/L (ref 3.5–5.1)
PROT SERPL-MCNC: 6.9 G/DL (ref 6–8.4)
RBC # BLD AUTO: 4.18 M/UL (ref 4.6–6.2)
SODIUM SERPL-SCNC: 137 MMOL/L (ref 136–145)
SPECIMEN SOURCE: NORMAL
TROPONIN I SERPL DL<=0.01 NG/ML-MCNC: 0.22 NG/ML (ref 0–0.03)
WBC # BLD AUTO: 4.75 K/UL (ref 3.9–12.7)

## 2020-02-20 PROCEDURE — 99285 EMERGENCY DEPT VISIT HI MDM: CPT | Mod: 25

## 2020-02-20 PROCEDURE — 85025 COMPLETE CBC W/AUTO DIFF WBC: CPT

## 2020-02-20 PROCEDURE — 96375 TX/PRO/DX INJ NEW DRUG ADDON: CPT

## 2020-02-20 PROCEDURE — 63600175 PHARM REV CODE 636 W HCPCS: Performed by: FAMILY MEDICINE

## 2020-02-20 PROCEDURE — 25000003 PHARM REV CODE 250: Performed by: FAMILY MEDICINE

## 2020-02-20 PROCEDURE — 87502 INFLUENZA DNA AMP PROBE: CPT

## 2020-02-20 PROCEDURE — 80053 COMPREHEN METABOLIC PANEL: CPT

## 2020-02-20 PROCEDURE — 63600175 PHARM REV CODE 636 W HCPCS: Performed by: EMERGENCY MEDICINE

## 2020-02-20 PROCEDURE — 63600175 PHARM REV CODE 636 W HCPCS: Performed by: NURSE PRACTITIONER

## 2020-02-20 PROCEDURE — 83880 ASSAY OF NATRIURETIC PEPTIDE: CPT

## 2020-02-20 PROCEDURE — 84484 ASSAY OF TROPONIN QUANT: CPT

## 2020-02-20 PROCEDURE — 11000001 HC ACUTE MED/SURG PRIVATE ROOM

## 2020-02-20 PROCEDURE — 93005 ELECTROCARDIOGRAM TRACING: CPT

## 2020-02-20 PROCEDURE — 96365 THER/PROPH/DIAG IV INF INIT: CPT

## 2020-02-20 PROCEDURE — 94761 N-INVAS EAR/PLS OXIMETRY MLT: CPT

## 2020-02-20 RX ORDER — AMLODIPINE BESYLATE 5 MG/1
5 TABLET ORAL DAILY
Status: DISCONTINUED | OUTPATIENT
Start: 2020-02-21 | End: 2020-02-21 | Stop reason: HOSPADM

## 2020-02-20 RX ORDER — HYDROCODONE BITARTRATE AND ACETAMINOPHEN 5; 325 MG/1; MG/1
1 TABLET ORAL EVERY 4 HOURS PRN
Status: DISCONTINUED | OUTPATIENT
Start: 2020-02-20 | End: 2020-02-21 | Stop reason: HOSPADM

## 2020-02-20 RX ORDER — AMOXICILLIN 250 MG
1 CAPSULE ORAL 2 TIMES DAILY
Status: DISCONTINUED | OUTPATIENT
Start: 2020-02-20 | End: 2020-02-21 | Stop reason: HOSPADM

## 2020-02-20 RX ORDER — FUROSEMIDE 20 MG/1
20 TABLET ORAL 2 TIMES DAILY
Status: DISCONTINUED | OUTPATIENT
Start: 2020-02-20 | End: 2020-02-21 | Stop reason: HOSPADM

## 2020-02-20 RX ORDER — ACETAMINOPHEN 325 MG/1
650 TABLET ORAL EVERY 4 HOURS PRN
Status: DISCONTINUED | OUTPATIENT
Start: 2020-02-20 | End: 2020-02-21 | Stop reason: HOSPADM

## 2020-02-20 RX ORDER — ASPIRIN 325 MG
325 TABLET ORAL
Status: DISCONTINUED | OUTPATIENT
Start: 2020-02-20 | End: 2020-02-20

## 2020-02-20 RX ORDER — FUROSEMIDE 10 MG/ML
60 INJECTION INTRAMUSCULAR; INTRAVENOUS
Status: COMPLETED | OUTPATIENT
Start: 2020-02-20 | End: 2020-02-20

## 2020-02-20 RX ORDER — POLYETHYLENE GLYCOL 3350 17 G/17G
17 POWDER, FOR SOLUTION ORAL DAILY
Status: DISCONTINUED | OUTPATIENT
Start: 2020-02-21 | End: 2020-02-21 | Stop reason: HOSPADM

## 2020-02-20 RX ORDER — LEVOFLOXACIN 5 MG/ML
750 INJECTION, SOLUTION INTRAVENOUS
Status: COMPLETED | OUTPATIENT
Start: 2020-02-20 | End: 2020-02-20

## 2020-02-20 RX ORDER — CETIRIZINE HYDROCHLORIDE 10 MG/1
10 TABLET ORAL DAILY
Status: DISCONTINUED | OUTPATIENT
Start: 2020-02-20 | End: 2020-02-21 | Stop reason: HOSPADM

## 2020-02-20 RX ORDER — SODIUM CHLORIDE 0.9 % (FLUSH) 0.9 %
10 SYRINGE (ML) INJECTION
Status: DISCONTINUED | OUTPATIENT
Start: 2020-02-20 | End: 2020-02-21 | Stop reason: HOSPADM

## 2020-02-20 RX ORDER — ENOXAPARIN SODIUM 100 MG/ML
40 INJECTION SUBCUTANEOUS EVERY 24 HOURS
Status: DISCONTINUED | OUTPATIENT
Start: 2020-02-20 | End: 2020-02-21 | Stop reason: HOSPADM

## 2020-02-20 RX ORDER — LEVOFLOXACIN 5 MG/ML
750 INJECTION, SOLUTION INTRAVENOUS
Status: DISCONTINUED | OUTPATIENT
Start: 2020-02-21 | End: 2020-02-21

## 2020-02-20 RX ADMIN — FUROSEMIDE 20 MG: 20 TABLET ORAL at 06:02

## 2020-02-20 RX ADMIN — LEVOFLOXACIN 750 MG: 750 INJECTION, SOLUTION INTRAVENOUS at 01:02

## 2020-02-20 RX ADMIN — STANDARDIZED SENNA CONCENTRATE AND DOCUSATE SODIUM 1 TABLET: 8.6; 5 TABLET ORAL at 08:02

## 2020-02-20 RX ADMIN — FUROSEMIDE 60 MG: 10 INJECTION, SOLUTION INTRAMUSCULAR; INTRAVENOUS at 02:02

## 2020-02-20 RX ADMIN — CETIRIZINE HYDROCHLORIDE 10 MG: 10 TABLET, FILM COATED ORAL at 06:02

## 2020-02-20 RX ADMIN — HYDROCODONE BITARTRATE AND ACETAMINOPHEN 1 TABLET: 5; 325 TABLET ORAL at 08:02

## 2020-02-20 RX ADMIN — ENOXAPARIN SODIUM 40 MG: 100 INJECTION SUBCUTANEOUS at 08:02

## 2020-02-20 NOTE — SUBJECTIVE & OBJECTIVE
Past Medical History:   Diagnosis Date    Dermatitis     Hypertension        Past Surgical History:   Procedure Laterality Date    CORONARY ANGIOGRAPHY N/A 2/17/2020    Procedure: ANGIOGRAM, CORONARY ARTERY;  Surgeon: Tomy Patterson MD;  Location: [facility] CATH LAB/EP;  Service: Cardiology;  Laterality: N/A;    HAND SURGERY      LEFT HEART CATHETERIZATION N/A 2/17/2020    Procedure: Left heart cath;  Surgeon: Tomy Patterson MD;  Location: [facility] CATH LAB/EP;  Service: Cardiology;  Laterality: N/A;    MANDIBLE FRACTURE SURGERY         Review of patient's allergies indicates:  No Known Allergies    No current facility-administered medications on file prior to encounter.      Current Outpatient Medications on File Prior to Encounter   Medication Sig    amlodipine (NORVASC) 5 MG tablet Take 1 tablet (5 mg total) by mouth once daily.    clobetasol 0.05% (TEMOVATE) 0.05 % Oint Apply topically 2 (two) times daily.    HYDROcodone-acetaminophen (NORCO) 5-325 mg per tablet Take 1 tablet by mouth every 4 (four) hours as needed for Pain.    ibuprofen (ADVIL,MOTRIN) 600 MG tablet Take 1 tablet (600 mg total) by mouth every 6 (six) hours as needed for Pain.     Family History     None        Tobacco Use    Smoking status: Current Every Day Smoker     Packs/day: 1.00     Types: Cigarettes    Smokeless tobacco: Never Used   Substance and Sexual Activity    Alcohol use: Yes     Alcohol/week: 6.0 standard drinks     Types: 6 Cans of beer per week     Comment: occ    Drug use: No    Sexual activity: Not on file     Review of Systems   Constitutional: Negative for fever.   HENT: Positive for congestion, postnasal drip and sneezing. Negative for ear discharge, sinus pressure and tinnitus.    Eyes: Negative for photophobia, itching and visual disturbance.   Respiratory: Positive for shortness of breath. Negative for apnea and wheezing.    Cardiovascular: Negative for chest pain and leg swelling.   Gastrointestinal:  Negative for abdominal distention and blood in stool.   Endocrine: Negative for polyphagia and polyuria.   Genitourinary: Negative for dysuria and testicular pain.   Musculoskeletal: Negative for arthralgias.   Skin: Negative for color change and wound.   Neurological: Negative for syncope, light-headedness and headaches.   Psychiatric/Behavioral: Negative for behavioral problems, decreased concentration and sleep disturbance.     Objective:     Vital Signs (Most Recent):  Temp: 98 °F (36.7 °C) (02/20/20 1432)  Pulse: 83 (02/20/20 1445)  Resp: 19 (02/20/20 1445)  BP: (!) 155/101 (02/20/20 1430)  SpO2: (!) 94 % (02/20/20 1445) Vital Signs (24h Range):  Temp:  [97.7 °F (36.5 °C)-98 °F (36.7 °C)] 98 °F (36.7 °C)  Pulse:  [74-90] 83  Resp:  [19-45] 19  SpO2:  [88 %-95 %] 94 %  BP: (144-155)/() 155/101     Weight: 67.1 kg (148 lb)  Body mass index is 19 kg/m².    Physical Exam   Constitutional: He is oriented to person, place, and time. He appears well-developed.   thin   HENT:   Head: Normocephalic and atraumatic.   Eyes: Pupils are equal, round, and reactive to light. EOM are normal.   Neck: Normal range of motion. Neck supple. No JVD present.   Cardiovascular: Normal rate, regular rhythm and normal heart sounds. Exam reveals no gallop and no friction rub.   No murmur heard.  Pulmonary/Chest: Effort normal and breath sounds normal.   Abdominal: Soft. Bowel sounds are normal. There is no tenderness.   Musculoskeletal: Normal range of motion. He exhibits no edema.   Neurological: He is alert and oriented to person, place, and time.   Skin: Skin is warm. Capillary refill takes less than 2 seconds.   Psychiatric: He has a normal mood and affect. His speech is normal and behavior is normal. His mood appears not anxious. Cognition and memory are normal. He does not exhibit a depressed mood.         CRANIAL NERVES     CN III, IV, VI   Pupils are equal, round, and reactive to light.  Extraocular motions are normal.         Significant Labs:   Bilirubin:   Recent Labs   Lab 02/17/20  0435 02/20/20  1140   BILITOT 0.5 0.9     Blood Culture: No results for input(s): LABBLOO in the last 48 hours.  CBC:   Recent Labs   Lab 02/20/20  1140   WBC 4.75   HGB 13.0*   HCT 38.4*        CMP:   Recent Labs   Lab 02/20/20  1140      K 4.8      CO2 19*   *   BUN 14   CREATININE 1.1   CALCIUM 8.8   PROT 6.9   ALBUMIN 3.6   BILITOT 0.9   ALKPHOS 92   AST 47*   ALT 28   ANIONGAP 11   EGFRNONAA >60     Cardiac Markers:   Recent Labs   Lab 02/20/20  1140   BNP 1,702*     Coagulation: No results for input(s): PT, INR, APTT in the last 48 hours.  Lactic Acid: No results for input(s): LACTATE in the last 48 hours.  Lipase: No results for input(s): LIPASE in the last 48 hours.  Troponin:   Recent Labs   Lab 02/20/20  1140   TROPONINI 0.219*     Urine Culture: No results for input(s): LABURIN in the last 48 hours.  Urine Studies: No results for input(s): COLORU, APPEARANCEUA, PHUR, SPECGRAV, PROTEINUA, GLUCUA, KETONESU, BILIRUBINUA, OCCULTUA, NITRITE, UROBILINOGEN, LEUKOCYTESUR, RBCUA, WBCUA, BACTERIA, SQUAMEPITHEL, HYALINECASTS in the last 48 hours.    Invalid input(s): WRIGHTSUR    Significant Imaging: I have reviewed all pertinent imaging results/findings within the past 24 hours.   Imaging Results          X-Ray Chest PA And Lateral (Final result)  Result time 02/20/20 12:43:45    Final result by Oswaldo Chappell MD (02/20/20 12:43:45)                 Impression:      Interval development of bilateral infiltrates      Electronically signed by: Oswaldo Chappell MD  Date:    02/20/2020  Time:    12:43             Narrative:    EXAMINATION:  XR CHEST PA AND LATERAL    CLINICAL HISTORY:  Cough    TECHNIQUE:  PA and lateral views of the chest were performed.    COMPARISON:  02/17/2020    FINDINGS:  Cardiac size is enlarged.  Patient has developed and extensive infiltrate at the right lung base and some infiltrate at the left lung  base compatible with bilateral pneumonia.  There may be a small pleural effusion present.

## 2020-02-20 NOTE — ED NOTES
"Pt stated "I"m going to throw up" Pt provided with emesis bag. Pt refusing to use emesis bag and stated "y'all can just clean it up off the floor"  "

## 2020-02-20 NOTE — H&P
[facility] Hospital Medicine  History & Physical    Patient Name: Anirudh David  MRN: [id number]  Admission Date: 2/20/2020  Attending Physician: Carol Wright MD  Primary Care Provider: Meenakshi Reed MD         Patient information was obtained from patient and ER records.     Subjective:     Principal Problem:<principal problem not specified>    Chief Complaint:   Chief Complaint   Patient presents with    Chest Pain     Pt presents to ED today c/o mid sternal chest pain, nausea, runny nose, cough, left ear pain. Pt is 3 days s/p left heart cath     Nasal Congestion    Cough    Otalgia    Nausea        HPI: Anirudh David is a 60 y/o M with PMH of hypertension, recent cardiac catheterization (2/17) and dermatitis present with one-week history of cough, congestion, ear pain and chest pain. There is associated subjective fever. Tried OTC Mucinex with no improvement.     Past Medical History:   Diagnosis Date    Dermatitis     Hypertension        Past Surgical History:   Procedure Laterality Date    CORONARY ANGIOGRAPHY N/A 2/17/2020    Procedure: ANGIOGRAM, CORONARY ARTERY;  Surgeon: Tomy Patterson MD;  Location: [facility] CATH LAB/EP;  Service: Cardiology;  Laterality: N/A;    HAND SURGERY      LEFT HEART CATHETERIZATION N/A 2/17/2020    Procedure: Left heart cath;  Surgeon: Tomy Patterson MD;  Location: [facility] CATH LAB/EP;  Service: Cardiology;  Laterality: N/A;    MANDIBLE FRACTURE SURGERY         Review of patient's allergies indicates:  No Known Allergies    No current facility-administered medications on file prior to encounter.      Current Outpatient Medications on File Prior to Encounter   Medication Sig    amlodipine (NORVASC) 5 MG tablet Take 1 tablet (5 mg total) by mouth once daily.    clobetasol 0.05% (TEMOVATE) 0.05 % Oint Apply topically 2 (two) times daily.    HYDROcodone-acetaminophen (NORCO) 5-325 mg per tablet Take 1 tablet by mouth every 4 (four)  hours as needed for Pain.    ibuprofen (ADVIL,MOTRIN) 600 MG tablet Take 1 tablet (600 mg total) by mouth every 6 (six) hours as needed for Pain.     Family History     None        Tobacco Use    Smoking status: Current Every Day Smoker     Packs/day: 1.00     Types: Cigarettes    Smokeless tobacco: Never Used   Substance and Sexual Activity    Alcohol use: Yes     Alcohol/week: 6.0 standard drinks     Types: 6 Cans of beer per week     Comment: occ    Drug use: No    Sexual activity: Not on file     Review of Systems   Constitutional: Negative for fever.   HENT: Positive for congestion, postnasal drip and sneezing. Negative for ear discharge, sinus pressure and tinnitus.    Eyes: Negative for photophobia, itching and visual disturbance.   Respiratory: Positive for shortness of breath. Negative for apnea and wheezing.    Cardiovascular: Negative for chest pain and leg swelling.   Gastrointestinal: Negative for abdominal distention and blood in stool.   Endocrine: Negative for polyphagia and polyuria.   Genitourinary: Negative for dysuria and testicular pain.   Musculoskeletal: Negative for arthralgias.   Skin: Negative for color change and wound.   Neurological: Negative for syncope, light-headedness and headaches.   Psychiatric/Behavioral: Negative for behavioral problems, decreased concentration and sleep disturbance.     Objective:     Vital Signs (Most Recent):  Temp: 98 °F (36.7 °C) (02/20/20 1432)  Pulse: 83 (02/20/20 1445)  Resp: 19 (02/20/20 1445)  BP: (!) 155/101 (02/20/20 1430)  SpO2: (!) 94 % (02/20/20 1445) Vital Signs (24h Range):  Temp:  [97.7 °F (36.5 °C)-98 °F (36.7 °C)] 98 °F (36.7 °C)  Pulse:  [74-90] 83  Resp:  [19-45] 19  SpO2:  [88 %-95 %] 94 %  BP: (144-155)/() 155/101     Weight: 67.1 kg (148 lb)  Body mass index is 19 kg/m².    Physical Exam   Constitutional: He is oriented to person, place, and time. He appears well-developed.   thin   HENT:   Head: Normocephalic and atraumatic.    Eyes: Pupils are equal, round, and reactive to light. EOM are normal.   Neck: Normal range of motion. Neck supple. No JVD present.   Cardiovascular: Normal rate, regular rhythm and normal heart sounds. Exam reveals no gallop and no friction rub.   No murmur heard.  Pulmonary/Chest: Effort normal and breath sounds normal.   Abdominal: Soft. Bowel sounds are normal. There is no tenderness.   Musculoskeletal: Normal range of motion. He exhibits no edema.   Neurological: He is alert and oriented to person, place, and time.   Skin: Skin is warm. Capillary refill takes less than 2 seconds.   Psychiatric: He has a normal mood and affect. His speech is normal and behavior is normal. His mood appears not anxious. Cognition and memory are normal. He does not exhibit a depressed mood.         CRANIAL NERVES     CN III, IV, VI   Pupils are equal, round, and reactive to light.  Extraocular motions are normal.        Significant Labs:   Bilirubin:   Recent Labs   Lab 02/17/20  0435 02/20/20  1140   BILITOT 0.5 0.9     Blood Culture: No results for input(s): LABBLOO in the last 48 hours.  CBC:   Recent Labs   Lab 02/20/20  1140   WBC 4.75   HGB 13.0*   HCT 38.4*        CMP:   Recent Labs   Lab 02/20/20  1140      K 4.8      CO2 19*   *   BUN 14   CREATININE 1.1   CALCIUM 8.8   PROT 6.9   ALBUMIN 3.6   BILITOT 0.9   ALKPHOS 92   AST 47*   ALT 28   ANIONGAP 11   EGFRNONAA >60     Cardiac Markers:   Recent Labs   Lab 02/20/20  1140   BNP 1,702*     Coagulation: No results for input(s): PT, INR, APTT in the last 48 hours.  Lactic Acid: No results for input(s): LACTATE in the last 48 hours.  Lipase: No results for input(s): LIPASE in the last 48 hours.  Troponin:   Recent Labs   Lab 02/20/20  1140   TROPONINI 0.219*     Urine Culture: No results for input(s): LABURIN in the last 48 hours.  Urine Studies: No results for input(s): COLORU, APPEARANCEUA, PHUR, SPECGRAV, PROTEINUA, GLUCUA, KETONESU, BILIRUBINUA,  OCCULTUA, NITRITE, UROBILINOGEN, LEUKOCYTESUR, RBCUA, WBCUA, BACTERIA, SQUAMEPITHEL, HYALINECASTS in the last 48 hours.    Invalid input(s): WRIGHTSUR    Significant Imaging: I have reviewed all pertinent imaging results/findings within the past 24 hours.   Imaging Results          X-Ray Chest PA And Lateral (Final result)  Result time 02/20/20 12:43:45    Final result by Oswaldo Chappell MD (02/20/20 12:43:45)                 Impression:      Interval development of bilateral infiltrates      Electronically signed by: Oswaldo Chappell MD  Date:    02/20/2020  Time:    12:43             Narrative:    EXAMINATION:  XR CHEST PA AND LATERAL    CLINICAL HISTORY:  Cough    TECHNIQUE:  PA and lateral views of the chest were performed.    COMPARISON:  02/17/2020    FINDINGS:  Cardiac size is enlarged.  Patient has developed and extensive infiltrate at the right lung base and some infiltrate at the left lung base compatible with bilateral pneumonia.  There may be a small pleural effusion present.                                  Assessment/Plan:     Acute pulmonary edema   diuresing continue IV lasix-  Supplemental oxygen  Echo 2/17  · Low normal left ventricular systolic function. The estimated ejection fraction is 45-50%.  · Mild left ventricular enlargement.  · Eccentric left ventricular hypertrophy.  · Grade III (severe) left ventricular diastolic dysfunction consistent with restrictive physiology.    Bilateral pneumonia  CXR Interval development of bilateral infiltrates compatible with bilateral pneumonia. There may be a small pleural effusion present.   Started on Levaquin  IV lasix- diuresing   Supplemental oxygen    NSTEMI (non-ST elevated myocardial infarction)  S/p cardiac catheterization   (2/17) Echo on 2/17  · Low normal left ventricular systolic function. The estimated ejection fraction is 45-50%.  · Mild left ventricular enlargement.  · Eccentric left ventricular hypertrophy.  · Grade III (severe) left  ventricular diastolic dysfunction consistent with restrictive physiology.      VTE Risk Mitigation (From admission, onward)    None             Carol Wright MD  Department of Hospital Medicine   [facility]

## 2020-02-20 NOTE — ED NOTES
"RN called into room pt states " I am fucking starving" RN stated to pt that meal tray has been ordered.   "

## 2020-02-20 NOTE — ASSESSMENT & PLAN NOTE
diuresing continue IV lasix-  Supplemental oxygen  Echo 2/17  · Low normal left ventricular systolic function. The estimated ejection fraction is 45-50%.  · Mild left ventricular enlargement.  · Eccentric left ventricular hypertrophy.  · Grade III (severe) left ventricular diastolic dysfunction consistent with restrictive physiology.

## 2020-02-20 NOTE — ASSESSMENT & PLAN NOTE
CXR Interval development of bilateral infiltrates compatible with bilateral pneumonia. There may be a small pleural effusion present.   Started on Levaquin  IV lasix- diuresing   Supplemental oxygen

## 2020-02-20 NOTE — PHYSICIAN QUERY
"PT Name: Anirudh David  MR #: [id number]    Physician Query Form - Heart  Condition Clarification     CDS/: Madalyn Clay               Contact information:[email or web address]  This form is a permanent document in the medical record.     Query Date: February 20, 2020    By submitting this query, we are merely seeking further clarification of documentation. Please utilize your independent clinical judgment when addressing the question(s) below.    The medical record contains the following   Indicators     Supporting Clinical Findings Location in Medical Record   x BNP BNP=1,164 Lab  2-17    EF      Radiology findings      Echo Results      "Ascites" documented      "SOB" or "MARI" documented      "Hypoxia" documented     x Heart Failure documented Optimal medical management for HFrEF Cardiac cath report 2-18    "Edema" documented      Diuretics/Meds      Treatment:      Other:      Heart failure (HF) can be acute, chronic or both. It is generally further specificed as systolic, diastolic, or combined. Lastly, it is important to identify an underlying etiology if known or suspected.     Common clues to acute exacerbation:  Rapidly progressive symptoms (w/in 2 weeks of presentation), using IV diuretics to treat, using supplemental O2, pulmonary edema on Xray, MI w/in 4 weeks, and/or BNP >500    Systolic Heart Failure: is defined as chart documentation of a left ventricular ejection fraction (LVEF) less than 40%     Diastolic Heart Failure: is defined as a left ventricular ejection fraction (LVEF) greater than 40%   +      Evidence of diastolic dysfunction on echocardiography OR    Right heart catheterization wedge pressure above 12 mm Hg OR    Left heart catheterization left ventricular end diastolic pressure 18 mm Hg or above.    References: *American Heart Association    The clinical guidelines noted below are only system guidelines, and do not replace the providers clinical judgment.   "   Provider, please specify the diagnosis associated with above clinical findings    Please specify the acuity of HFrEF.      [ x  ] Chronic Systolic Heart Failure - Pre-existing systolic HF diagnosis.  EF < 40%  without  acute HF symptoms documented    [   ] Other (please specify):   [  ] Clinically Undetermined                           Please document in your progress notes daily for the duration of treatment until resolved and include in your discharge summary.

## 2020-02-20 NOTE — HPI
Frankie Anirudh is a 60 y/o M with PMH of hypertension, recent cardiac catheterization (2/17) and dermatitis present with one-week history of cough, congestion, ear pain and chest pain. There is associated subjective fever. Tried OTC Mucinex with no improvement.

## 2020-02-20 NOTE — ASSESSMENT & PLAN NOTE
S/p cardiac catheterization   (2/17) Echo on 2/17  · Low normal left ventricular systolic function. The estimated ejection fraction is 45-50%.  · Mild left ventricular enlargement.  · Eccentric left ventricular hypertrophy.  · Grade III (severe) left ventricular diastolic dysfunction consistent with restrictive physiology.

## 2020-02-20 NOTE — ED PROVIDER NOTES
Encounter Date: 2/20/2020       History     Chief Complaint   Patient presents with    Chest Pain     Pt presents to ED today c/o mid sternal chest pain, nausea, runny nose, cough, left ear pain. Pt is 3 days s/p left heart cath     Nasal Congestion    Cough    Otalgia    Nausea     Patient is a 59-year-old male with medical history of hypertension, recent cardiac catheterization (2/17) and dermatitis presenting to the ED for cough, congestion, ear pain and chest pain with cough for the past week.  Patient was seen here on 02/17 and discharged yesterday but symptoms have not improved.  Patient states he feels like he has a fever but has not checked it at home.  Patient states he has tried over-the-counter Mucinex with minimal relief of symptoms.    The history is provided by the patient and medical records.     Review of patient's allergies indicates:  No Known Allergies  Past Medical History:   Diagnosis Date    Dermatitis     Hypertension      Past Surgical History:   Procedure Laterality Date    CORONARY ANGIOGRAPHY N/A 2/17/2020    Procedure: ANGIOGRAM, CORONARY ARTERY;  Surgeon: Tomy Patterson MD;  Location: [facility] CATH LAB/EP;  Service: Cardiology;  Laterality: N/A;    HAND SURGERY      LEFT HEART CATHETERIZATION N/A 2/17/2020    Procedure: Left heart cath;  Surgeon: Tomy Patterson MD;  Location: [facility] CATH LAB/EP;  Service: Cardiology;  Laterality: N/A;    MANDIBLE FRACTURE SURGERY       History reviewed. No pertinent family history.  Social History     Tobacco Use    Smoking status: Current Every Day Smoker     Packs/day: 1.00     Types: Cigarettes    Smokeless tobacco: Never Used   Substance Use Topics    Alcohol use: Yes     Alcohol/week: 6.0 standard drinks     Types: 6 Cans of beer per week     Comment: occ    Drug use: No     Review of Systems   Constitutional: Positive for fatigue and fever. Negative for activity change, appetite change and chills.   HENT: Positive for congestion, ear  pain ( left), postnasal drip, rhinorrhea, sinus pressure, sinus pain, sneezing and sore throat. Negative for trouble swallowing and voice change.    Eyes: Negative for photophobia and visual disturbance.   Respiratory: Positive for cough, chest tightness and shortness of breath.    Cardiovascular: Negative for chest pain and palpitations.   Gastrointestinal: Positive for nausea and vomiting. Negative for abdominal pain, constipation and diarrhea.   Genitourinary: Negative for decreased urine volume, difficulty urinating, dysuria and urgency.   Musculoskeletal: Negative for arthralgias, back pain and myalgias.   Skin: Negative for color change, rash and wound.   Neurological: Negative for dizziness, syncope, weakness, numbness and headaches.   Hematological: Does not bruise/bleed easily.   All other systems reviewed and are negative.      Physical Exam     Initial Vitals   BP Pulse Resp Temp SpO2   02/20/20 1202 02/20/20 1130 02/20/20 1130 02/20/20 1125 02/20/20 1145   (!) 144/108 81 (!) 36 97.7 °F (36.5 °C) (!) 89 %      MAP       --                Physical Exam    Nursing note and vitals reviewed.  Constitutional: Vital signs are normal. He appears well-developed and well-nourished. He is cooperative. No distress.   HENT:   Head: Normocephalic and atraumatic.   Left Ear: No drainage, swelling or tenderness. No mastoid tenderness. Tympanic membrane is not injected. A middle ear effusion is present.   Nose: Mucosal edema and rhinorrhea present.   Mouth/Throat: Uvula is midline and mucous membranes are normal. Posterior oropharyngeal erythema present. No oropharyngeal exudate, posterior oropharyngeal edema or tonsillar abscesses.   Clear nasal discharge noted.   Eyes: EOM and lids are normal. Pupils are equal, round, and reactive to light.   Neck: Trachea normal, normal range of motion, full passive range of motion without pain and phonation normal. Neck supple. No JVD present.   Cardiovascular: Normal rate, regular  rhythm and intact distal pulses.   Pulses:       Radial pulses are 2+ on the right side, and 2+ on the left side.   Pulmonary/Chest: Effort normal and breath sounds normal.   Abdominal: Soft. Normal appearance and bowel sounds are normal. There is no tenderness.   Musculoskeletal: Normal range of motion.   Neurological: He is alert and oriented to person, place, and time. He has normal strength. No sensory deficit. He displays a negative Romberg sign. GCS eye subscore is 4. GCS verbal subscore is 5. GCS motor subscore is 6.   Skin: Skin is warm, dry and intact. Capillary refill takes 2 to 3 seconds. No abrasion, no laceration and no rash noted. No cyanosis. Nails show no clubbing.         ED Course   Critical Care  Date/Time: 2/20/2020 1:00 PM  Performed by: Josie Antoine NP  Authorized by: Barbara Whatley MD   Direct patient critical care time: 20 minutes  Additional history critical care time: 10 minutes  Ordering / reviewing critical care time: 10 minutes  Documentation critical care time: 10 minutes  Consulting other physicians critical care time: 5 minutes  Total critical care time (exclusive of procedural time) : 55 minutes  Critical care time was exclusive of separately billable procedures and treating other patients and teaching time.  Critical care was necessary to treat or prevent imminent or life-threatening deterioration of the following conditions: respiratory failure and cardiac failure.  Critical care was time spent personally by me on the following activities: development of treatment plan with patient or surrogate, discussions with consultants, evaluation of patient's response to treatment, examination of patient, obtaining history from patient or surrogate, ordering and performing treatments and interventions, ordering and review of laboratory studies, ordering and review of radiographic studies, pulse oximetry, re-evaluation of patient's condition and review of old charts.        Labs  Reviewed   CBC W/ AUTO DIFFERENTIAL - Abnormal; Notable for the following components:       Result Value    RBC 4.18 (*)     Hemoglobin 13.0 (*)     Hematocrit 38.4 (*)     Mean Corpuscular Hemoglobin 31.1 (*)     All other components within normal limits   COMPREHENSIVE METABOLIC PANEL - Abnormal; Notable for the following components:    CO2 19 (*)     Glucose 111 (*)     AST 47 (*)     All other components within normal limits   TROPONIN I - Abnormal; Notable for the following components:    Troponin I 0.219 (*)     All other components within normal limits   B-TYPE NATRIURETIC PEPTIDE - Abnormal; Notable for the following components:    BNP 1,702 (*)     All other components within normal limits   INFLUENZA A & B BY MOLECULAR   B-TYPE NATRIURETIC PEPTIDE        ECG Results          EKG 12-lead (Final result)  Result time 02/24/20 09:09:33    Final result by Interface, Lab In [facility] (02/24/20 09:09:33)                 Narrative:    Test Reason : R07.9,    Vent. Rate : 079 BPM     Atrial Rate : 079 BPM     P-R Int : 176 ms          QRS Dur : 088 ms      QT Int : 392 ms       P-R-T Axes : 065 073 -10 degrees     QTc Int : 449 ms    Sinus rhythm with occasional Premature ventricular complexes  Possible Left atrial enlargement  Nonspecific T wave abnormality  Abnormal ECG  When compared with ECG of 17-FEB-2020 13:37,  No significant change was found  Confirmed by Jaycee Rogers MD ([id number]) on 2/24/2020 9:09:17 AM    Referred By: [email or web address]   SELF           Confirmed By:Jaycee Rogers MD                             EKG 12-LEAD (Final result)  Result time 03/03/20 10:32:51    Final result by Unknown User (03/03/20 10:32:51)                                Imaging Results          X-Ray Chest PA And Lateral (Final result)  Result time 02/20/20 12:43:45    Final result by Oswaldo Chappell MD (02/20/20 12:43:45)                 Impression:      Interval development of bilateral infiltrates      Electronically  signed by: Oswaldo Chappell MD  Date:    02/20/2020  Time:    12:43             Narrative:    EXAMINATION:  XR CHEST PA AND LATERAL    CLINICAL HISTORY:  Cough    TECHNIQUE:  PA and lateral views of the chest were performed.    COMPARISON:  02/17/2020    FINDINGS:  Cardiac size is enlarged.  Patient has developed and extensive infiltrate at the right lung base and some infiltrate at the left lung base compatible with bilateral pneumonia.  There may be a small pleural effusion present.                                 Medical Decision Making:   Initial Assessment:   Emergent evaluation of a 59-year-old male presenting for cough, congestion and chest wall pain for the past week.  Of note patient was seen here on 02/17 and had cardiac catheterization due to NSTEMI.  Patient states symptoms have not resolved since discharged yesterday.  Patient endorses intermittent fevers but has not checked a temp at home.  On exam patient is A&O x3. Vital signs stable.  Not febrile and nontoxic appearing.  Cap refill less than 3 sec.  Groin access for cardiac catheterization site clean and dry. Breath sounds clear bilaterally.  Abdomen soft and nontender. Mucous membranes pink and moist. Clear nasal discharge appreciated.  Tonsil erythema but no exudates or edema noted.  Differential Diagnosis:   Differential diagnoses include but are not limited to viral URI including influenza type illness, bronchitis, pneumonia, CHF, or reactive airway disease.  Symptoms are not consistent with diagnosis of pulmonary emboli.      Clinical Tests:   Lab Tests: Ordered and Reviewed  The following lab test(s) were unremarkable: CBC, CMP, Troponin and BNP       <> Summary of Lab: Patient's CBC unremarkable. No leukocytosis noted.  H&H stable at 13 and 38.4.  CMP unremarkable.  Troponin elevated 0.219 but this is a decreased due to patient's recent cardiac catheterization.  Influenza negative. BNP elevated at 1702.  Radiological Study: Ordered and  Reviewed  Medical Tests: Ordered and Reviewed  ED Management:  I will get labs, imaging, medicate and reassess.  I discussed this case with my supervising physician.    Chest x-ray shows interval development of bilateral infiltrates.  Appears to be bilateral pneumonia.  BNP is elevated.  Discussed case with Cardiology who recommends IV Lasix.  Will admit to Hospital Medicine for further evaluation and treatment.  Patient updated on plan of care.  All questions and concerns addressed.              Attending Attestation:     Physician Attestation Statement for NP/PA:   I have conducted a face to face encounter with this patient in addition to the NP/PA, due to NP/PA Request    Other NP/PA Attestation Additions:    History of Present Illness: Patient is 58 y/o male with past medical history of hypertension, recent cardiac catheterization (2/17) for NSTEMI presenting to the ED for cough, congestion, ear pain and chest pain with cough for the past week.  Patient was seen here on 02/17 and discharged yesterday but symptoms have not improved.  +subjective fever. Patient states he has tried over-the-counter Mucinex with minimal relief of symptoms.   LHC revealed Normal coronary arteries, EF 45%, +Diastolic dysfunction, Severe 4+ MR with severely dilated LA           Physical Exam: VSS, NAD, nontoxic appearing.  RRR, no murmur  + bilateral crackles, no respiratory distress  No peripheral edema.     Medical Decision Making: - CXR today with bilateral infiltrates vs pulmonary edema?  - troponin trending down  - BNP elevated     Patient given IV abx to cover for PNA but also lasix in attempts to diurese.  He will be admitted for further management.                       ED Course as of Mar 05 2052   Thu Feb 20, 2020   1146 EKG with sinus rhythm.  Rate of 79 bpm.  + occasional PVCs.  Normal axis.  No STEMI    [LD]   1302 BP(!): 144/108 [LD]   1304 Temp: 97.7 °F (36.5 °C) [LD]   1304 Patient is 58 y/o male with past medical  history of hypertension, recent cardiac catheterization (2/17) for NSTEMI presenting to the ED for cough, congestion, ear pain and chest pain with cough for the past week.  Patient was seen here on 02/17 and discharged yesterday but symptoms have not improved.  +subjective fever. Patient states he has tried over-the-counter Mucinex with minimal relief of symptoms.   LHC revealed Normal coronary arteries, EF 45%, +Diastolic dysfunction, Severe 4+ MR with severely dilated LA    VSS, NAD, nontoxic appearing.  RRR, no murmur  + bilateral crackles, no respiratory distress  No peripheral edema.    - CXR today with bilateral infiltrates vs pulmonary edema?  - troponin trending down  - BNP elevated    Patient given IV abx to cover for PNA but also lasix in attempts to diurese.  He will be admitted for further management.     [LD]   1404 Discussed with Dr García with cardiology who recommended admission to medicine team.  States that she can follow as consultant    [LD]   1410 Discussed with Dr Perez who accepts patient    [LD]      ED Course User Index  [LD] Barbara Whatley MD                Clinical Impression:       ICD-10-CM ICD-9-CM   1. Pneumonia of both lower lobes due to infectious organism J18.1 483.8   2. Chest pain R07.9 786.50   3. Cough R05 786.2   4. Acute on chronic congestive heart failure, unspecified heart failure type I50.9 428.0         Disposition:   Disposition: Admitted  Condition: Stable         Josie Antoine NP  03/02/20 1525       Barbara Whatley MD  03/05/20 2052

## 2020-02-20 NOTE — ED TRIAGE NOTES
"Pt presents to ED with complaints of SOB, fever, chills, productive cough, runny nose, HA, vomiting, diarrhea, L ear pain x1 week. Pt reports mucus is "green and brown"   "

## 2020-02-21 VITALS
SYSTOLIC BLOOD PRESSURE: 116 MMHG | HEIGHT: 74 IN | WEIGHT: 142.88 LBS | OXYGEN SATURATION: 94 % | TEMPERATURE: 97 F | DIASTOLIC BLOOD PRESSURE: 82 MMHG | BODY MASS INDEX: 18.34 KG/M2 | RESPIRATION RATE: 18 BRPM | HEART RATE: 76 BPM

## 2020-02-21 PROBLEM — F14.10 COCAINE ABUSE: Status: ACTIVE | Noted: 2020-02-21

## 2020-02-21 PROBLEM — I34.0 SEVERE MITRAL REGURGITATION: Status: ACTIVE | Noted: 2020-02-21

## 2020-02-21 PROBLEM — I10 ESSENTIAL HYPERTENSION: Status: ACTIVE | Noted: 2020-02-21

## 2020-02-21 LAB
ANION GAP SERPL CALC-SCNC: 8 MMOL/L (ref 8–16)
BASOPHILS # BLD AUTO: 0.03 K/UL (ref 0–0.2)
BASOPHILS NFR BLD: 0.8 % (ref 0–1.9)
BUN SERPL-MCNC: 16 MG/DL (ref 6–20)
CALCIUM SERPL-MCNC: 8.3 MG/DL (ref 8.7–10.5)
CHLORIDE SERPL-SCNC: 105 MMOL/L (ref 95–110)
CO2 SERPL-SCNC: 25 MMOL/L (ref 23–29)
CREAT SERPL-MCNC: 1.2 MG/DL (ref 0.5–1.4)
DIFFERENTIAL METHOD: ABNORMAL
EOSINOPHIL # BLD AUTO: 0.1 K/UL (ref 0–0.5)
EOSINOPHIL NFR BLD: 1.7 % (ref 0–8)
ERYTHROCYTE [DISTWIDTH] IN BLOOD BY AUTOMATED COUNT: 11.9 % (ref 11.5–14.5)
EST. GFR  (AFRICAN AMERICAN): >60 ML/MIN/1.73 M^2
EST. GFR  (NON AFRICAN AMERICAN): >60 ML/MIN/1.73 M^2
GLUCOSE SERPL-MCNC: 86 MG/DL (ref 70–110)
HCT VFR BLD AUTO: 40.3 % (ref 40–54)
HGB BLD-MCNC: 13.7 G/DL (ref 14–18)
IMM GRANULOCYTES # BLD AUTO: 0.02 K/UL (ref 0–0.04)
IMM GRANULOCYTES NFR BLD AUTO: 0.6 % (ref 0–0.5)
LYMPHOCYTES # BLD AUTO: 1.2 K/UL (ref 1–4.8)
LYMPHOCYTES NFR BLD: 33.4 % (ref 18–48)
MAGNESIUM SERPL-MCNC: 2.1 MG/DL (ref 1.6–2.6)
MCH RBC QN AUTO: 31.1 PG (ref 27–31)
MCHC RBC AUTO-ENTMCNC: 34 G/DL (ref 32–36)
MCV RBC AUTO: 91 FL (ref 82–98)
MONOCYTES # BLD AUTO: 0.6 K/UL (ref 0.3–1)
MONOCYTES NFR BLD: 16.9 % (ref 4–15)
NEUTROPHILS # BLD AUTO: 1.7 K/UL (ref 1.8–7.7)
NEUTROPHILS NFR BLD: 46.6 % (ref 38–73)
NRBC BLD-RTO: 0 /100 WBC
PHOSPHATE SERPL-MCNC: 4.1 MG/DL (ref 2.7–4.5)
PLATELET # BLD AUTO: 240 K/UL (ref 150–350)
PMV BLD AUTO: 10.4 FL (ref 9.2–12.9)
POTASSIUM SERPL-SCNC: 3.8 MMOL/L (ref 3.5–5.1)
RBC # BLD AUTO: 4.41 M/UL (ref 4.6–6.2)
SODIUM SERPL-SCNC: 138 MMOL/L (ref 136–145)
WBC # BLD AUTO: 3.62 K/UL (ref 3.9–12.7)

## 2020-02-21 PROCEDURE — 80048 BASIC METABOLIC PNL TOTAL CA: CPT

## 2020-02-21 PROCEDURE — 36415 COLL VENOUS BLD VENIPUNCTURE: CPT

## 2020-02-21 PROCEDURE — 85025 COMPLETE CBC W/AUTO DIFF WBC: CPT

## 2020-02-21 PROCEDURE — 99233 SBSQ HOSP IP/OBS HIGH 50: CPT | Mod: ,,, | Performed by: INTERNAL MEDICINE

## 2020-02-21 PROCEDURE — 25000003 PHARM REV CODE 250: Performed by: FAMILY MEDICINE

## 2020-02-21 PROCEDURE — 84100 ASSAY OF PHOSPHORUS: CPT

## 2020-02-21 PROCEDURE — 99233 PR SUBSEQUENT HOSPITAL CARE,LEVL III: ICD-10-PCS | Mod: ,,, | Performed by: INTERNAL MEDICINE

## 2020-02-21 PROCEDURE — 83735 ASSAY OF MAGNESIUM: CPT

## 2020-02-21 RX ORDER — LEVOFLOXACIN 750 MG/1
750 TABLET ORAL DAILY
Qty: 4 TABLET | Refills: 0 | Status: ON HOLD | OUTPATIENT
Start: 2020-02-21 | End: 2020-03-12 | Stop reason: HOSPADM

## 2020-02-21 RX ORDER — LEVOFLOXACIN 750 MG/1
750 TABLET ORAL DAILY
Status: DISCONTINUED | OUTPATIENT
Start: 2020-02-21 | End: 2020-02-21 | Stop reason: HOSPADM

## 2020-02-21 RX ORDER — POLYETHYLENE GLYCOL 3350 17 G/17G
17 POWDER, FOR SOLUTION ORAL DAILY
Qty: 510 G | Refills: 0 | Status: ON HOLD | OUTPATIENT
Start: 2020-02-21 | End: 2020-01-01

## 2020-02-21 RX ORDER — CETIRIZINE HYDROCHLORIDE 10 MG/1
10 TABLET ORAL DAILY
Qty: 30 TABLET | Refills: 0 | Status: ON HOLD | OUTPATIENT
Start: 2020-02-21 | End: 2020-01-01

## 2020-02-21 RX ORDER — FUROSEMIDE 20 MG/1
20 TABLET ORAL DAILY
Qty: 30 TABLET | Refills: 11 | Status: ON HOLD | OUTPATIENT
Start: 2020-02-21 | End: 2020-03-12 | Stop reason: HOSPADM

## 2020-02-21 RX ADMIN — AMLODIPINE BESYLATE 5 MG: 5 TABLET ORAL at 09:02

## 2020-02-21 RX ADMIN — STANDARDIZED SENNA CONCENTRATE AND DOCUSATE SODIUM 1 TABLET: 8.6; 5 TABLET ORAL at 09:02

## 2020-02-21 RX ADMIN — FUROSEMIDE 20 MG: 20 TABLET ORAL at 09:02

## 2020-02-21 RX ADMIN — CETIRIZINE HYDROCHLORIDE 10 MG: 10 TABLET, FILM COATED ORAL at 09:02

## 2020-02-21 NOTE — PROGRESS NOTES
Nereida Grant, POONAM   Registered Nurse   Care Management   Progress Notes   Addendum                    []Hide copied text    []Shanon for details     Pt 30 day readmit, assessment done on 2/17 during last admit, assessment copied from that admission: 2/17/20-2/17/20, [facility], IP d/c home, no HH/HME         Dalila Castaneda RN      Utilization Management                                       Pt lives in [place] with his sister Kamilla.  Pt rides his bike to get around but his sister drives him as needed.  Pt states he fell and broke his arm 2 weeks ago.  He states he is being seen at [facility] for treatment.  Pt is otherwise independent at home and denies difficulty affording medications.  White board updated with CM name and contact information.  Discharge brochure provided.  Pt encouraged to call with any questions or concerns.  Cm will continue to follow pt through transitions of care and assist with any discharge needs.          02/17/20 0956   Discharge Assessment   Assessment Type Discharge Planning Assessment   Confirmed/corrected address and phone number on facesheet? Yes   Assessment information obtained from? Patient   Communicated expected length of stay with patient/caregiver yes   Prior to hospitilization cognitive status: Alert/Oriented   Prior to hospitalization functional status: Independent   Current cognitive status: Alert/Oriented   Current Functional Status: Independent   Facility Arrived From: home   Lives With sibling(s)   Able to Return to Prior Arrangements yes   Is patient able to care for self after discharge? Yes   Who are your caregiver(s) and their phone number(s)? Kamilla (pt's sister)   Patient's perception of discharge disposition home or selfcare   Readmission Within the Last 30 Days no previous admission in last 30 days   Patient currently being followed by outpatient case management? No   Patient currently receives any other outside agency services? No   Equipment Currently Used at Home  none   Do you have any problems affording any of your prescribed medications? No   Is the patient taking medications as prescribed? yes   Does the patient have transportation home? Yes   Transportation Anticipated family or friend will provide   Does the patient receive services at the [facility]? No   Discharge Plan A Home   Discharge Plan B Home with family   DME Needed Upon Discharge     (TBD)   Patient/Family in Agreement with Plan yes      Vipul Castaneda RN,   [phone]

## 2020-02-21 NOTE — HPI
Anirudh David is a 59 y.o male with HTN, Dermatitis, cocaine abuse who presented to the ED for cough, congestion, ear pain and chest pain with cough for the past week.   Patient states he has tried over-the-counter Mucinex with minimal relief of symptoms. Patient reports chest pain which he attributes to his chest congestion. Denies any exertional symptoms. Patient rides a bike for transportation and denies any pain with this activity. He denies any MARI, edema, palpitations, syncope, dizziness. Troponin flat at 0.2. Patient underwent coronary angiography earlier this week noting normal coronary arteries. CXR cw bilateral LL PNA. LVEF low normal at 45-50%.

## 2020-02-21 NOTE — PLAN OF CARE
Pt left comfortable in bed. Denies chest pain/discomfort. Bed in low and locked position with alarms on. V/S stable. Safety maintained.

## 2020-02-21 NOTE — SUBJECTIVE & OBJECTIVE
Interval History: awake and alert, requesting to be discharge.     Review of Systems   Constitutional: Negative for fever.   Respiratory: Negative for shortness of breath and wheezing.    Cardiovascular: Negative for chest pain and leg swelling.   Gastrointestinal: Negative for abdominal distention.   Genitourinary: Negative for dysuria and testicular pain.   Musculoskeletal: Negative for arthralgias.   Skin: Negative for color change.   Neurological: Negative for syncope, light-headedness and headaches.   Psychiatric/Behavioral: Negative for behavioral problems.     Objective:     Vital Signs (Most Recent):  Temp: 96.5 °F (35.8 °C) (02/21/20 0746)  Pulse: 77 (02/21/20 0746)  Resp: 18 (02/21/20 0746)  BP: 116/82 (02/21/20 0746)  SpO2: (!) 94 % (02/21/20 0746) Vital Signs (24h Range):  Temp:  [96.5 °F (35.8 °C)-98.2 °F (36.8 °C)] 96.5 °F (35.8 °C)  Pulse:  [40-92] 77  Resp:  [18-45] 18  SpO2:  [88 %-100 %] 94 %  BP: (116-155)/() 116/82     Weight: 64.8 kg (142 lb 13.7 oz)  Body mass index is 18.34 kg/m².    Intake/Output Summary (Last 24 hours) at 2/21/2020 0832  Last data filed at 2/20/2020 2023  Gross per 24 hour   Intake --   Output 3375 ml   Net -3375 ml      Physical Exam   Constitutional: He is oriented to person, place, and time. He appears well-developed.   thin   HENT:   Head: Normocephalic and atraumatic.   Neck: Neck supple.   Cardiovascular: Normal rate, regular rhythm and normal heart sounds. Exam reveals no gallop and no friction rub.   No murmur heard.  Pulmonary/Chest: Effort normal and breath sounds normal.   Abdominal: Soft. Bowel sounds are normal. There is no tenderness.   Musculoskeletal: Normal range of motion. He exhibits no edema.   Neurological: He is alert and oriented to person, place, and time.   Skin: Skin is warm. Capillary refill takes less than 2 seconds.   Psychiatric: He has a normal mood and affect. His speech is normal. His mood appears not anxious. Cognition and memory are  normal. He does not exhibit a depressed mood.       Significant Labs:   BMP:   Recent Labs   Lab 02/21/20  0724   GLU 86      K 3.8      CO2 25   BUN 16   CREATININE 1.2   CALCIUM 8.3*   MG 2.1     CBC:   Recent Labs   Lab 02/20/20  1140 02/21/20  0724   WBC 4.75 3.62*   HGB 13.0* 13.7*   HCT 38.4* 40.3    240     Coagulation: No results for input(s): PT, INR, APTT in the last 48 hours.  Lactic Acid: No results for input(s): LACTATE in the last 48 hours.    Significant Imaging: I have reviewed all pertinent imaging results/findings within the past 24 hours.

## 2020-02-21 NOTE — PROGRESS NOTES
Pharmacist Intervention IV to PO Note    Anirudh David is a 59 y.o. male being treated with IV medication levofloxacin     Patient Data:    Vital Signs (Most Recent):  Temp: 96.5 °F (35.8 °C) (02/21/20 0746)  Pulse: 76 (02/21/20 0749)  Resp: 18 (02/21/20 0746)  BP: 116/82 (02/21/20 0746)  SpO2: (!) 94 % (02/21/20 0746)   Vital Signs (72h Range):  Temp:  [96.5 °F (35.8 °C)-98.2 °F (36.8 °C)]   Pulse:  [40-92]   Resp:  [18-45]   BP: (116-155)/()   SpO2:  [88 %-100 %]      CBC:  Recent Labs   Lab 02/17/20  0435 02/20/20  1140 02/21/20  0724   WBC 5.12 4.75 3.62*   RBC 3.70* 4.18* 4.41*   HGB 12.1* 13.0* 13.7*   HCT 34.6* 38.4* 40.3    256 240   MCV 94 92 91   MCH 32.7* 31.1* 31.1*   MCHC 35.0 33.9 34.0     CMP:     Recent Labs   Lab 02/17/20  0435 02/20/20  1140 02/21/20  0724   GLU 92 111* 86   CALCIUM 8.5* 8.8 8.3*   ALBUMIN 3.3* 3.6  --    PROT 7.0 6.9  --     137 138   K 4.3 4.8 3.8   CO2 22* 19* 25    107 105   BUN 18 14 16   CREATININE 1.3 1.1 1.2   ALKPHOS 100 92  --    ALT 27 28  --    AST 43* 47*  --    BILITOT 0.5 0.9  --        Dietary Orders:  Diet Orders            Diet Cardiac: Cardiac starting at 02/20 1935            Based on the following criteria, this patient qualifies for intravenous to oral conversion:  [x] The patients gastrointestinal tract is functioning (tolerating medications via oral or enteral route for 24 hours and tolerating food or enteral feeds for 24 hours).  [x] The patient is hemodynamically stable for 24 hours (heart rate <100 beats per minute, systolic blood pressure >99 mm Hg, and respiratory rate <20 breaths per minute).  [x] The patient shows clinical improvement (afebrile for at least 24 hours and white blood cell count downtrending or normalized). Additionally, the patient must be non-neutropenic (absolute neutrophil count >500 cells/mm3).  [x] For antimicrobials, the patient has received IV therapy for at least 24 hours.    IV medication  levofloxacin will be changed to oral medication levofloxacin    Pharmacist's Name: Cole Reed  Pharmacist's Extension: [phone]

## 2020-02-21 NOTE — NURSING
Cued into patient's room with permission. Patient is alert and oriented cooperative answering admit questions. Denies pain at this time. Fall precautions education done. Patient verbalized understanding. Time allowed for questions and answers. WIll continue to be available as needed to assist with patient care

## 2020-02-21 NOTE — NURSING
Patient being D/C to home, AAOx4, shows no acute signs of distress, verbally understands plan of care, IV site clean and intact, telemetry monitor removed, patient left via w/c to bus.

## 2020-02-21 NOTE — ASSESSMENT & PLAN NOTE
CXR cw bilateral PNA  BNP elevated on admission   Patient given Lasix 60 mg IV in the ED and is net -3.3L; now prescribed Lasix 20 mg BID   LVEF 45-50% per recent TTE  Patient does not appear overloaded on exam, would not be opposed to discontinuation of Lasix

## 2020-02-21 NOTE — SUBJECTIVE & OBJECTIVE
Past Medical History:   Diagnosis Date    Dermatitis     Hypertension        Past Surgical History:   Procedure Laterality Date    CORONARY ANGIOGRAPHY N/A 2/17/2020    Procedure: ANGIOGRAM, CORONARY ARTERY;  Surgeon: Tomy Patterson MD;  Location: [facility] CATH LAB/EP;  Service: Cardiology;  Laterality: N/A;    HAND SURGERY      LEFT HEART CATHETERIZATION N/A 2/17/2020    Procedure: Left heart cath;  Surgeon: Tomy Patterson MD;  Location: [facility] CATH LAB/EP;  Service: Cardiology;  Laterality: N/A;    MANDIBLE FRACTURE SURGERY         Review of patient's allergies indicates:  No Known Allergies    No current facility-administered medications on file prior to encounter.      Current Outpatient Medications on File Prior to Encounter   Medication Sig    amlodipine (NORVASC) 5 MG tablet Take 1 tablet (5 mg total) by mouth once daily.    clobetasol 0.05% (TEMOVATE) 0.05 % Oint Apply topically 2 (two) times daily.    HYDROcodone-acetaminophen (NORCO) 5-325 mg per tablet Take 1 tablet by mouth every 4 (four) hours as needed for Pain.    ibuprofen (ADVIL,MOTRIN) 600 MG tablet Take 1 tablet (600 mg total) by mouth every 6 (six) hours as needed for Pain.     Family History     None        Tobacco Use    Smoking status: Current Every Day Smoker     Packs/day: 1.00     Types: Cigarettes    Smokeless tobacco: Never Used   Substance and Sexual Activity    Alcohol use: Yes     Alcohol/week: 6.0 standard drinks     Types: 6 Cans of beer per week     Comment: occ    Drug use: No    Sexual activity: Not on file     Review of Systems   Constitution: Positive for fever. Negative for diaphoresis.   HENT: Positive for congestion and ear pain.    Eyes: Negative.    Cardiovascular: Positive for chest pain. Negative for dyspnea on exertion, irregular heartbeat, leg swelling, near-syncope, orthopnea, palpitations, paroxysmal nocturnal dyspnea and syncope.   Respiratory: Positive for cough and shortness of breath.    Endocrine:  Negative.    Hematologic/Lymphatic: Negative.    Skin: Negative.    Musculoskeletal: Negative.    Gastrointestinal: Negative.    Genitourinary: Negative.    Neurological: Negative.    Psychiatric/Behavioral: Positive for substance abuse.   Allergic/Immunologic: Negative.      Objective:     Vital Signs (Most Recent):  Temp: 96.5 °F (35.8 °C) (02/21/20 0746)  Pulse: 77 (02/21/20 0746)  Resp: 18 (02/21/20 0746)  BP: 116/82 (02/21/20 0746)  SpO2: (!) 94 % (02/21/20 0746) Vital Signs (24h Range):  Temp:  [96.5 °F (35.8 °C)-98.2 °F (36.8 °C)] 96.5 °F (35.8 °C)  Pulse:  [40-92] 77  Resp:  [18-45] 18  SpO2:  [88 %-100 %] 94 %  BP: (116-155)/() 116/82     Weight: 64.8 kg (142 lb 13.7 oz)  Body mass index is 18.34 kg/m².    SpO2: (!) 94 %  O2 Device (Oxygen Therapy): room air      Intake/Output Summary (Last 24 hours) at 2/21/2020 0903  Last data filed at 2/20/2020 2023  Gross per 24 hour   Intake --   Output 3375 ml   Net -3375 ml       Lines/Drains/Airways     Peripheral Intravenous Line                 Peripheral IV - Single Lumen 02/20/20 1200 20 G Left Antecubital less than 1 day                Physical Exam   Constitutional: He is oriented to person, place, and time. He appears well-developed and well-nourished. No distress.   HENT:   Head: Atraumatic.   Eyes: Right eye exhibits no discharge. Left eye exhibits no discharge.   Neck: No JVD present.   Cardiovascular: Normal rate and regular rhythm.   Murmur heard.  High-pitched blowing holosystolic murmur is present at the apex.  Pulmonary/Chest: Effort normal. He has decreased breath sounds. He has no rales.   Abdominal: Soft. Bowel sounds are normal.   Musculoskeletal: He exhibits no edema.   Neurological: He is alert and oriented to person, place, and time.   Skin: Skin is warm and dry. He is not diaphoretic.   Psychiatric: He has a normal mood and affect. His behavior is normal. Judgment and thought content normal.       Significant Labs:   BMP:   Recent Labs    Lab 02/20/20  1140 02/21/20  0724   * 86    138   K 4.8 3.8    105   CO2 19* 25   BUN 14 16   CREATININE 1.1 1.2   CALCIUM 8.8 8.3*   MG  --  2.1   , CMP   Recent Labs   Lab 02/20/20  1140 02/21/20  0724    138   K 4.8 3.8    105   CO2 19* 25   * 86   BUN 14 16   CREATININE 1.1 1.2   CALCIUM 8.8 8.3*   PROT 6.9  --    ALBUMIN 3.6  --    BILITOT 0.9  --    ALKPHOS 92  --    AST 47*  --    ALT 28  --    ANIONGAP 11 8   ESTGFRAFRICA >60 >60   EGFRNONAA >60 >60   , CBC   Recent Labs   Lab 02/20/20  1140 02/21/20  0724   WBC 4.75 3.62*   HGB 13.0* 13.7*   HCT 38.4* 40.3    240   , INR No results for input(s): INR, PROTIME in the last 48 hours., Lipid Panel No results for input(s): CHOL, HDL, LDLCALC, TRIG, CHOLHDL in the last 48 hours., Troponin   Recent Labs   Lab 02/20/20  1140   TROPONINI 0.219*    and All pertinent lab results from the last 24 hours have been reviewed.    Significant Imaging: Echocardiogram:   Transthoracic echo (TTE) complete (Cupid Only):   Results for orders placed or performed during the hospital encounter of 02/17/20   Echo Color Flow Doppler? Yes   Result Value Ref Range    BSA 1.87 m2    LA WIDTH 6.29 cm    LVIDD 5.68 3.5 - 6.0 cm    IVS 0.98 0.6 - 1.1 cm    PW 1.07 0.6 - 1.1 cm    Ao root annulus 2.84 cm    LVIDS 4.48 (A) 2.1 - 4.0 cm    FS 21 28 - 44 %    LA volume 150.98 cm3    STJ 2.55 cm    Ascending aorta 2.84 cm    LV mass 232.43 g    LA size 4.28 cm    RVDD 2.78 cm    TAPSE 1.52 cm    Left Ventricle Relative Wall Thickness 0.38 cm    AV mean gradient 5 mmHg    AV valve area 2.29 cm2    AV Velocity Ratio 0.51     AV index (prosthetic) 0.59     E/A ratio 3.16     E wave decelartion time 271.14 msec    Pulm vein S/D ratio 0.92     LVOT diameter 2.22 cm    LVOT area 3.9 cm2    LVOT peak praful 0.70 m/s    LVOT peak VTI 12.41 cm    Ao peak praful 1.38 m/s    Ao VTI 20.94 cm    LVOT stroke volume 48.01 cm3    AV peak gradient 8 mmHg    MV Peak E Praful  1.20 m/s    TR Max Praful 3.23 m/s    MV Peak A Praful 0.38 m/s    PV Peak S Praful 0.46 m/s    PV Peak D Praful 0.50 m/s    LV Systolic Volume 91.36 mL    LV Systolic Volume Index 47.8 mL/m2    LV Diastolic Volume 158.56 mL    LV Diastolic Volume Index 82.93 mL/m2    LA Volume Index 79.0 mL/m2    LV Mass Index 122 g/m2    RA Major Axis 6.23 cm    Left Atrium Minor Axis 6.39 cm    Left Atrium Major Axis 6.82 cm    Triscuspid Valve Regurgitation Peak Gradient 42 mmHg    RA Width 5.42 cm    Right Atrial Pressure (from IVC) 8 mmHg    TV rest pulmonary artery pressure 50 mmHg    Narrative    · Low normal left ventricular systolic function. The estimated ejection   fraction is 45-50%.  · Mild left ventricular enlargement.  · Eccentric left ventricular hypertrophy.  · Grade III (severe) left ventricular diastolic dysfunction consistent   with restrictive physiology.  · No wall motion abnormalities.  · Normal right ventricular systolic function.  · Pulmonary hypertension present.  · The estimated PA systolic pressure is 50 mmHg.  · Intermediate central venous pressure (8 mmHg).  · Severe left atrial enlargement.  · Moderate right atrial enlargement.  · Moderate mitral sclerosis: myxomatous  · Severe mitral regurgitation. 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 Would consider JOSE A and further valve disease workup

## 2020-02-21 NOTE — PROGRESS NOTES
[facility] Hospital Medicine  Progress Note    Patient Name: Anirudh David  MRN: [id number]  Patient Class: IP- Inpatient   Admission Date: 2/20/2020  Length of Stay: 1 days  Attending Physician: Carol Wright*  Primary Care Provider: Meenakshi Reed MD        Subjective:     Principal Problem:<principal problem not specified>        HPI:  Anirudh David is a 60 y/o M with PMH of hypertension, recent cardiac catheterization (2/17) and dermatitis present with one-week history of cough, congestion, ear pain and chest pain. There is associated subjective fever. Tried OTC Mucinex with no improvement.     Overview/Hospital Course:  No notes on file    Interval History: awake and alert, requesting to be discharge.     Review of Systems   Constitutional: Negative for fever.   Respiratory: Negative for shortness of breath and wheezing.    Cardiovascular: Negative for chest pain and leg swelling.   Gastrointestinal: Negative for abdominal distention.   Genitourinary: Negative for dysuria and testicular pain.   Musculoskeletal: Negative for arthralgias.   Skin: Negative for color change.   Neurological: Negative for syncope, light-headedness and headaches.   Psychiatric/Behavioral: Negative for behavioral problems.     Objective:     Vital Signs (Most Recent):  Temp: 96.5 °F (35.8 °C) (02/21/20 0746)  Pulse: 77 (02/21/20 0746)  Resp: 18 (02/21/20 0746)  BP: 116/82 (02/21/20 0746)  SpO2: (!) 94 % (02/21/20 0746) Vital Signs (24h Range):  Temp:  [96.5 °F (35.8 °C)-98.2 °F (36.8 °C)] 96.5 °F (35.8 °C)  Pulse:  [40-92] 77  Resp:  [18-45] 18  SpO2:  [88 %-100 %] 94 %  BP: (116-155)/() 116/82     Weight: 64.8 kg (142 lb 13.7 oz)  Body mass index is 18.34 kg/m².    Intake/Output Summary (Last 24 hours) at 2/21/2020 0832  Last data filed at 2/20/2020 2023  Gross per 24 hour   Intake --   Output 3375 ml   Net -3375 ml      Physical Exam   Constitutional: He is oriented to person, place, and  time. He appears well-developed.   thin   HENT:   Head: Normocephalic and atraumatic.   Neck: Neck supple.   Cardiovascular: Normal rate, regular rhythm and normal heart sounds. Exam reveals no gallop and no friction rub.   No murmur heard.  Pulmonary/Chest: Effort normal and breath sounds normal.   Abdominal: Soft. Bowel sounds are normal. There is no tenderness.   Musculoskeletal: Normal range of motion. He exhibits no edema.   Neurological: He is alert and oriented to person, place, and time.   Skin: Skin is warm. Capillary refill takes less than 2 seconds.   Psychiatric: He has a normal mood and affect. His speech is normal. His mood appears not anxious. Cognition and memory are normal. He does not exhibit a depressed mood.       Significant Labs:   BMP:   Recent Labs   Lab 02/21/20  0724   GLU 86      K 3.8      CO2 25   BUN 16   CREATININE 1.2   CALCIUM 8.3*   MG 2.1     CBC:   Recent Labs   Lab 02/20/20  1140 02/21/20  0724   WBC 4.75 3.62*   HGB 13.0* 13.7*   HCT 38.4* 40.3    240     Coagulation: No results for input(s): PT, INR, APTT in the last 48 hours.  Lactic Acid: No results for input(s): LACTATE in the last 48 hours.    Significant Imaging: I have reviewed all pertinent imaging results/findings within the past 24 hours.      Assessment/Plan:      Acute pulmonary edema   diuresing continue IV lasix-  Supplemental oxygen  Echo 2/17  · Low normal left ventricular systolic function. The estimated ejection fraction is 45-50%.  · Mild left ventricular enlargement.  · Eccentric left ventricular hypertrophy.  · Grade III (severe) left ventricular diastolic dysfunction consistent with restrictive physiology.    Bilateral pneumonia  CXR Interval development of bilateral infiltrates compatible with bilateral pneumonia. There may be a small pleural effusion present.   Started on Levaquin  IV lasix- diuresing switch to po  Supplemental oxygen, wean to RA    NSTEMI (non-ST elevated myocardial  infarction)  S/p cardiac catheterization   (2/17) Echo on 2/17  · Low normal left ventricular systolic function. The estimated ejection fraction is 45-50%.  · Mild left ventricular enlargement.  · Eccentric left ventricular hypertrophy.  · Grade III (severe) left ventricular diastolic dysfunction consistent with restrictive physiology.      VTE Risk Mitigation (From admission, onward)         Ordered     enoxaparin injection 40 mg  Daily      02/20/20 1934     Place sequential compression device  Until discontinued      02/20/20 1934     Place ALYSSA hose  Until discontinued      02/20/20 1934     IP VTE HIGH RISK PATIENT  Once      02/20/20 1934                      Carol Wright MD  Department of Hospital Medicine   [facility]

## 2020-02-21 NOTE — NURSING
Pt transferred to unit from ER via wheelchair. Made comfortable in bed with bed in low and locked position. Alarms on and audible. Pt is alert and rational. Breathes comfortably on RA. Denies chest pain/discomfort. V/S charted. Telem applied.

## 2020-02-21 NOTE — ASSESSMENT & PLAN NOTE
CXR Interval development of bilateral infiltrates compatible with bilateral pneumonia. There may be a small pleural effusion present.   Started on Levaquin  IV lasix- diuresing switch to po  Supplemental oxygen, wean to RA

## 2020-02-21 NOTE — PLAN OF CARE
VN cued into room to review discharge instructions.  Went over doctor specific instructions, new medications, where to , follow up appointments, when to call the doctor.  All questions answered.  Informed pt of survey.  Pt waiting for ride, will request transport when ride is here.

## 2020-02-21 NOTE — PROGRESS NOTES
DME orders were placed for Sling and Swathe for pt's Right Arm      [facility] [place] [place] [phone].

## 2020-02-21 NOTE — DISCHARGE SUMMARY
[facility] Hospital Medicine  Discharge Summary      Patient Name: Anirudh David  MRN: [id number]  Admission Date: 2/20/2020  Hospital Length of Stay: 1 days  Discharge Date and Time: 2/21/2020 12:54 PM  Attending Physician: Carol Wright*   Discharging Provider: Carol Wright MD  Primary Care Provider: Meenakshi Reed MD      HPI:   Anirudh David is a 60 y/o M with PMH of hypertension, recent cardiac catheterization (2/17) and dermatitis present with one-week history of cough, congestion, ear pain and chest pain. There is associated subjective fever. Tried OTC Mucinex with no improvement.     * No surgery found *      Hospital Course:   No notes on file     Consults:   Consults (From admission, onward)        Status Ordering Provider     Inpatient consult to [facility]     Provider:  Michael Moreon MD    Acknowledged JO, CAROL HODGE.          * Bilateral pneumonia  CXR Interval development of bilateral infiltrates compatible with bilateral pneumonia. There may be a small pleural effusion present.   Started on Levaquin  IV lasix- diuresing switch to po  Supplemental oxygen, wean to RA    Acute pulmonary edema   diuresing continue IV lasix-  Supplemental oxygen  Echo 2/17  · Low normal left ventricular systolic function. The estimated ejection fraction is 45-50%.  · Mild left ventricular enlargement.  · Eccentric left ventricular hypertrophy.  · Grade III (severe) left ventricular diastolic dysfunction consistent with restrictive physiology.    NSTEMI (non-ST elevated myocardial infarction)  S/p cardiac catheterization   (2/17) Echo on 2/17  · Low normal left ventricular systolic function. The estimated ejection fraction is 45-50%.  · Mild left ventricular enlargement.  · Eccentric left ventricular hypertrophy.  · Grade III (severe) left ventricular diastolic dysfunction consistent with restrictive physiology.      Final Active Diagnoses:     Diagnosis Date Noted POA    PRINCIPAL PROBLEM:  Bilateral pneumonia [J18.9] 02/20/2020 Yes    Acute pulmonary edema [J81.0] 02/20/2020 Yes    Pneumonia of both lower lobes due to infectious organism [J18.1] 02/20/2020 Yes    NSTEMI (non-ST elevated myocardial infarction) [I21.4] 02/17/2020 Yes      Problems Resolved During this Admission:       Discharged Condition: stable    Disposition: Home or Self Care    Follow Up:  Follow-up Information     Meenakshi Reed MD In 1 week.    Specialty:  Family Medicine  Contact information:  [address] [facility]  [place] [id number] [phone]                 Patient Instructions:      Diet Cardiac     Activity as tolerated       Significant Diagnostic Studies:     Pending Diagnostic Studies:     None         Medications:  Reconciled Home Medications:      Medication List      START taking these medications    cetirizine 10 MG tablet  Commonly known as:  ZYRTEC  Take 1 tablet (10 mg total) by mouth once daily.     furosemide 20 MG tablet  Commonly known as:  LASIX  Take 1 tablet (20 mg total) by mouth once daily.     levoFLOXacin 750 MG tablet  Commonly known as:  LEVAQUIN  Take 1 tablet (750 mg total) by mouth once daily.     polyethylene glycol 17 gram/dose powder  Commonly known as:  GLYCOLAX  mix and take 17 g ( 1 capful )  by mouth once daily as directed        CONTINUE taking these medications    amLODIPine 5 MG tablet  Commonly known as:  NORVASC  Take 1 tablet (5 mg total) by mouth once daily.     clobetasol 0.05% 0.05 % Oint  Commonly known as:  TEMOVATE  Apply topically 2 (two) times daily.     HYDROcodone-acetaminophen 5-325 mg per tablet  Commonly known as:  NORCO  Take 1 tablet by mouth every 4 (four) hours as needed for Pain.     ibuprofen 600 MG tablet  Commonly known as:  ADVIL,MOTRIN  Take 1 tablet (600 mg total) by mouth every 6 (six) hours as needed for Pain.            Indwelling Lines/Drains at time of discharge:   Lines/Drains/Airways      None                 Time spent on the discharge of patient: 35 minutes  Patient was seen and examined on the date of discharge and determined to be suitable for discharge.         Carol Wright MD  Department of Hospital Medicine  [facility]

## 2020-02-21 NOTE — HOSPITAL COURSE
02/21/2020 Hemodynamically stable, on Abx for PNA. IV Lasix given in the ED for concerns of volume overload and patient is noted net -3.3L.

## 2020-02-21 NOTE — PLAN OF CARE
11:50 am, TN met bedside with pt, medications delivered and pt stated he had the sling. Pt to phone his sister Kamilla to bring him home.     11:07 am, DME orders were placed for Sling and Swathe for pt's Right Arm, [place] contacted [facility] [place], [place] [phone]. Sling to be delivered bedside to pt prior to discharge    [facility], to deliver pt's meds bedside prior to discharge    No HH ordered    March 03, 2020 Follow up with Meenakshi Reed MD   Tuesday Mar 3, 2020   @ 9:10 am  [address]   [facility]   [place] [address]   [phone]             02/21/20 1107   Final Note   Assessment Type Final Discharge Note   Anticipated Discharge Disposition Home   What phone number can be called within the next 1-3 days to see how you are doing after discharge? [phone]   Hospital Follow Up  Appt(s) scheduled? Yes   Right Care Referral Info   Post Acute Recommendation No Care     Nereida Grant, RN Transitional Navigator  [phone]

## 2020-02-26 ENCOUNTER — PATIENT OUTREACH (OUTPATIENT)
Dept: ADMINISTRATIVE | Facility: CLINIC | Age: 60
End: 2020-02-26

## 2020-02-26 NOTE — PATIENT INSTRUCTIONS
Treating Pneumonia  Pneumonia is an infection of one or both of the lungs. Pneumonia:  · Is usually caused by either a virus or a bacteria  · Can be very serious, especially in infants, young children, and older adults. Its also serious for those with other long-term health problems or weakened immune systems.  · Is sometimes treated at home and sometimes in the hospital    Antibiotic medicines  Antibiotics may be prescribed for pneumonia caused by bacteria. They may be pills (oral medicines), or shots (injections). Or they may be given by IV (intravenously) into a vein. If you are taking oral medicines at home:  · Fill your prescription and start taking your medicine as soon as you can.  · You will likely start to feel better in a day or 2, but dont stop taking the antibiotic.  · Use a pill organizer to help you remember to take your medicine.  · Let your healthcare provider know if you have side effects.  · Take your medicine exactly as directed on the label. Talk to your provider or pharmacist if you have any questions.  Antiviral medicines  Antiviral medicine may be prescribed for pneumonia caused by a virus. For example, antiviral medicine may be prescribed for pneumonia caused by the flu virus. Antibiotics do not work against viruses. If you are taking antiviral medicine at home:  · Fill your prescription and start taking your medicine as soon as you can.  · Talk with your provider or pharmacist about possible side effects.  · Take the medicine exactly as instructed.  To relieve symptoms  There are many medicines that can help relieve symptoms of pneumonia. Some are prescription and some are over-the-counter.  Your healthcare provider may recommend:  · Acetaminophen or ibuprofen to lower your fever and to lessen headache or other pain  · Cough medicine to loosen mucus or to reduce coughing  Make sure you check with your healthcare provider or pharmacist before taking any over-the-counter  medicines.  Special treatments  If you are hospitalized for pneumonia, you may have other therapies, including:  · Inhaled medicines to help with breathing or chest congestion  · Supplemental oxygen to increase low oxygen levels  Drink fluids and eat healthy  You should eat healthy to help your body fight the infection. Drinking a lot of fluids helps to replace fluids lost from fever and to loosen mucus in your chest.  · Diet. Make healthy food choices, including fruits and vegetables, lean meats and other proteins, 100% whole grain and low- or no-fat dairy products.  · Fluids. Drink at least 6 to 8 tall glasses a day. Water and 100% fruit or vegetable juice are best.  Get plenty of rest and sleep  You may be more tired than usual for a while. It is important to get enough sleep at night. Its also important to rest during the day. Talk with your healthcare provider if coughing or other symptoms are interfering with your sleep.  Preventing the spread of germs  The best thing you can do to prevent spreading germs is to wash your hands often. You should:  · Rub your hand with soap and water for 20 to 30 seconds.  · Clean in between your fingers, the backs of your hands, and around your nails.  · Dry your hands on a separate towel or use paper towels.  You should also:  · Keep alcohol-based hand  nearby.  · Make sure you also clean surfaces that you touch. Use a product that kills all types of germs.  · Stay away from others until you are feeling better.  When to call your healthcare provider  Call your healthcare provider if you have any of the following:  · Symptoms get worse  · Fever continues  · Shortness of breath gets worse  · Increased mucus or mucus that is darker in color  · Coughing gets worse  · Lips or fingers are bluish in color  · Side effects from your medicine   Date Last Reviewed: 12/1/2016  © 7701-3983 [employer]. [address] [place] [address], [place] [address]. All rights reserved.  This information is not intended as a substitute for professional medical care. Always follow your healthcare professional's instructions.

## 2020-03-11 ENCOUNTER — HOSPITAL ENCOUNTER (OUTPATIENT)
Facility: HOSPITAL | Age: 60
Discharge: HOME | End: 2020-03-12
Attending: EMERGENCY MEDICINE | Admitting: FAMILY MEDICINE
Payer: MEDICAID

## 2020-03-11 DIAGNOSIS — R06.02 SOB (SHORTNESS OF BREATH): ICD-10-CM

## 2020-03-11 DIAGNOSIS — R09.02 HYPOXIA: ICD-10-CM

## 2020-03-11 DIAGNOSIS — I50.9 CONGESTIVE HEART FAILURE, UNSPECIFIED HF CHRONICITY, UNSPECIFIED HEART FAILURE TYPE: Primary | ICD-10-CM

## 2020-03-11 DIAGNOSIS — I50.9 CONGESTIVE HEART FAILURE: ICD-10-CM

## 2020-03-11 PROBLEM — F17.200 SMOKER: Status: ACTIVE | Noted: 2020-03-11

## 2020-03-11 LAB
ALBUMIN SERPL BCP-MCNC: 3.7 G/DL (ref 3.5–5.2)
ALP SERPL-CCNC: 100 U/L (ref 55–135)
ALT SERPL W/O P-5'-P-CCNC: 28 U/L (ref 10–44)
ANION GAP SERPL CALC-SCNC: 9 MMOL/L (ref 8–16)
AST SERPL-CCNC: 42 U/L (ref 10–40)
BASOPHILS # BLD AUTO: 0.05 K/UL (ref 0–0.2)
BASOPHILS NFR BLD: 1.2 % (ref 0–1.9)
BILIRUB SERPL-MCNC: 1.2 MG/DL (ref 0.1–1)
BNP SERPL-MCNC: 2667 PG/ML (ref 0–99)
BUN SERPL-MCNC: 16 MG/DL (ref 6–20)
CALCIUM SERPL-MCNC: 8.9 MG/DL (ref 8.7–10.5)
CHLORIDE SERPL-SCNC: 107 MMOL/L (ref 95–110)
CO2 SERPL-SCNC: 24 MMOL/L (ref 23–29)
CREAT SERPL-MCNC: 1.2 MG/DL (ref 0.5–1.4)
DIFFERENTIAL METHOD: ABNORMAL
EOSINOPHIL # BLD AUTO: 0 K/UL (ref 0–0.5)
EOSINOPHIL NFR BLD: 1 % (ref 0–8)
ERYTHROCYTE [DISTWIDTH] IN BLOOD BY AUTOMATED COUNT: 12.6 % (ref 11.5–14.5)
EST. GFR  (AFRICAN AMERICAN): >60 ML/MIN/1.73 M^2
EST. GFR  (NON AFRICAN AMERICAN): >60 ML/MIN/1.73 M^2
GLUCOSE SERPL-MCNC: 152 MG/DL (ref 70–110)
HCT VFR BLD AUTO: 41.6 % (ref 40–54)
HGB BLD-MCNC: 13.4 G/DL (ref 14–18)
IMM GRANULOCYTES # BLD AUTO: 0.01 K/UL (ref 0–0.04)
IMM GRANULOCYTES NFR BLD AUTO: 0.2 % (ref 0–0.5)
INFLUENZA A, MOLECULAR: NEGATIVE
INFLUENZA B, MOLECULAR: NEGATIVE
LYMPHOCYTES # BLD AUTO: 1.3 K/UL (ref 1–4.8)
LYMPHOCYTES NFR BLD: 30.7 % (ref 18–48)
MAGNESIUM SERPL-MCNC: 2 MG/DL (ref 1.6–2.6)
MCH RBC QN AUTO: 29.7 PG (ref 27–31)
MCHC RBC AUTO-ENTMCNC: 32.2 G/DL (ref 32–36)
MCV RBC AUTO: 92 FL (ref 82–98)
MONOCYTES # BLD AUTO: 0.5 K/UL (ref 0.3–1)
MONOCYTES NFR BLD: 12.4 % (ref 4–15)
NEUTROPHILS # BLD AUTO: 2.2 K/UL (ref 1.8–7.7)
NEUTROPHILS NFR BLD: 54.5 % (ref 38–73)
NRBC BLD-RTO: 0 /100 WBC
PLATELET # BLD AUTO: 198 K/UL (ref 150–350)
PMV BLD AUTO: 11.1 FL (ref 9.2–12.9)
POTASSIUM SERPL-SCNC: 3.9 MMOL/L (ref 3.5–5.1)
POTASSIUM SERPL-SCNC: 4.3 MMOL/L (ref 3.5–5.1)
PROT SERPL-MCNC: 7.6 G/DL (ref 6–8.4)
RBC # BLD AUTO: 4.51 M/UL (ref 4.6–6.2)
SODIUM SERPL-SCNC: 140 MMOL/L (ref 136–145)
SPECIMEN SOURCE: NORMAL
TROPONIN I SERPL DL<=0.01 NG/ML-MCNC: 0.12 NG/ML (ref 0–0.03)
TROPONIN I SERPL DL<=0.01 NG/ML-MCNC: 0.13 NG/ML (ref 0–0.03)
WBC # BLD AUTO: 4.1 K/UL (ref 3.9–12.7)

## 2020-03-11 PROCEDURE — 96372 THER/PROPH/DIAG INJ SC/IM: CPT | Mod: 59

## 2020-03-11 PROCEDURE — 96376 TX/PRO/DX INJ SAME DRUG ADON: CPT

## 2020-03-11 PROCEDURE — G0378 HOSPITAL OBSERVATION PER HR: HCPCS

## 2020-03-11 PROCEDURE — 25000003 PHARM REV CODE 250: Performed by: FAMILY MEDICINE

## 2020-03-11 PROCEDURE — 99285 EMERGENCY DEPT VISIT HI MDM: CPT | Mod: 25

## 2020-03-11 PROCEDURE — 25000242 PHARM REV CODE 250 ALT 637 W/ HCPCS: Performed by: NURSE PRACTITIONER

## 2020-03-11 PROCEDURE — 87502 INFLUENZA DNA AMP PROBE: CPT

## 2020-03-11 PROCEDURE — 96374 THER/PROPH/DIAG INJ IV PUSH: CPT

## 2020-03-11 PROCEDURE — 96375 TX/PRO/DX INJ NEW DRUG ADDON: CPT

## 2020-03-11 PROCEDURE — 84132 ASSAY OF SERUM POTASSIUM: CPT

## 2020-03-11 PROCEDURE — 93010 ELECTROCARDIOGRAM REPORT: CPT | Mod: ,,, | Performed by: INTERNAL MEDICINE

## 2020-03-11 PROCEDURE — 85025 COMPLETE CBC W/AUTO DIFF WBC: CPT

## 2020-03-11 PROCEDURE — 93005 ELECTROCARDIOGRAM TRACING: CPT

## 2020-03-11 PROCEDURE — 84484 ASSAY OF TROPONIN QUANT: CPT | Mod: 91

## 2020-03-11 PROCEDURE — 93010 EKG 12-LEAD: ICD-10-PCS | Mod: ,,, | Performed by: INTERNAL MEDICINE

## 2020-03-11 PROCEDURE — 36415 COLL VENOUS BLD VENIPUNCTURE: CPT

## 2020-03-11 PROCEDURE — 63600175 PHARM REV CODE 636 W HCPCS: Performed by: FAMILY MEDICINE

## 2020-03-11 PROCEDURE — 94761 N-INVAS EAR/PLS OXIMETRY MLT: CPT

## 2020-03-11 PROCEDURE — 94640 AIRWAY INHALATION TREATMENT: CPT

## 2020-03-11 PROCEDURE — 63600175 PHARM REV CODE 636 W HCPCS: Performed by: NURSE PRACTITIONER

## 2020-03-11 PROCEDURE — 80053 COMPREHEN METABOLIC PANEL: CPT

## 2020-03-11 PROCEDURE — 83880 ASSAY OF NATRIURETIC PEPTIDE: CPT

## 2020-03-11 PROCEDURE — 83735 ASSAY OF MAGNESIUM: CPT

## 2020-03-11 RX ORDER — SODIUM CHLORIDE 0.9 % (FLUSH) 0.9 %
10 SYRINGE (ML) INJECTION
Status: CANCELLED | OUTPATIENT
Start: 2020-03-11

## 2020-03-11 RX ORDER — METHYLPREDNISOLONE SOD SUCC 125 MG
125 VIAL (EA) INJECTION
Status: COMPLETED | OUTPATIENT
Start: 2020-03-11 | End: 2020-03-11

## 2020-03-11 RX ORDER — SODIUM CHLORIDE 0.9 % (FLUSH) 0.9 %
10 SYRINGE (ML) INJECTION
Status: DISCONTINUED | OUTPATIENT
Start: 2020-03-11 | End: 2020-03-12 | Stop reason: HOSPADM

## 2020-03-11 RX ORDER — ONDANSETRON 4 MG/1
4 TABLET, ORALLY DISINTEGRATING ORAL EVERY 8 HOURS PRN
Status: DISCONTINUED | OUTPATIENT
Start: 2020-03-11 | End: 2020-03-12 | Stop reason: HOSPADM

## 2020-03-11 RX ORDER — FUROSEMIDE 10 MG/ML
40 INJECTION INTRAMUSCULAR; INTRAVENOUS
Status: DISCONTINUED | OUTPATIENT
Start: 2020-03-11 | End: 2020-03-12 | Stop reason: HOSPADM

## 2020-03-11 RX ORDER — ENOXAPARIN SODIUM 100 MG/ML
40 INJECTION SUBCUTANEOUS EVERY 24 HOURS
Status: DISCONTINUED | OUTPATIENT
Start: 2020-03-11 | End: 2020-03-12 | Stop reason: HOSPADM

## 2020-03-11 RX ORDER — FAMOTIDINE 20 MG/1
20 TABLET, FILM COATED ORAL EVERY 12 HOURS PRN
Status: CANCELLED | OUTPATIENT
Start: 2020-03-11

## 2020-03-11 RX ORDER — TALC
6 POWDER (GRAM) TOPICAL NIGHTLY PRN
Status: CANCELLED | OUTPATIENT
Start: 2020-03-11

## 2020-03-11 RX ORDER — ONDANSETRON 2 MG/ML
4 INJECTION INTRAMUSCULAR; INTRAVENOUS
Status: COMPLETED | OUTPATIENT
Start: 2020-03-11 | End: 2020-03-11

## 2020-03-11 RX ORDER — FUROSEMIDE 10 MG/ML
40 INJECTION INTRAMUSCULAR; INTRAVENOUS
Status: COMPLETED | OUTPATIENT
Start: 2020-03-11 | End: 2020-03-11

## 2020-03-11 RX ORDER — ONDANSETRON 8 MG/1
8 TABLET, ORALLY DISINTEGRATING ORAL EVERY 8 HOURS PRN
Status: CANCELLED | OUTPATIENT
Start: 2020-03-11

## 2020-03-11 RX ORDER — ACETAMINOPHEN 325 MG/1
650 TABLET ORAL EVERY 6 HOURS PRN
Status: CANCELLED | OUTPATIENT
Start: 2020-03-11

## 2020-03-11 RX ORDER — CETIRIZINE HYDROCHLORIDE 10 MG/1
10 TABLET ORAL DAILY
Status: CANCELLED | OUTPATIENT
Start: 2020-03-12

## 2020-03-11 RX ORDER — AMOXICILLIN 250 MG
1 CAPSULE ORAL 2 TIMES DAILY
Status: DISCONTINUED | OUTPATIENT
Start: 2020-03-11 | End: 2020-03-12 | Stop reason: HOSPADM

## 2020-03-11 RX ORDER — IPRATROPIUM BROMIDE AND ALBUTEROL SULFATE 2.5; .5 MG/3ML; MG/3ML
3 SOLUTION RESPIRATORY (INHALATION)
Status: COMPLETED | OUTPATIENT
Start: 2020-03-11 | End: 2020-03-11

## 2020-03-11 RX ADMIN — ENOXAPARIN SODIUM 40 MG: 100 INJECTION SUBCUTANEOUS at 05:03

## 2020-03-11 RX ADMIN — STANDARDIZED SENNA CONCENTRATE AND DOCUSATE SODIUM 1 TABLET: 8.6; 5 TABLET ORAL at 08:03

## 2020-03-11 RX ADMIN — FUROSEMIDE 40 MG: 10 INJECTION, SOLUTION INTRAVENOUS at 12:03

## 2020-03-11 RX ADMIN — METHYLPREDNISOLONE SODIUM SUCCINATE 125 MG: 125 INJECTION, POWDER, FOR SOLUTION INTRAMUSCULAR; INTRAVENOUS at 11:03

## 2020-03-11 RX ADMIN — ONDANSETRON 4 MG: 2 INJECTION INTRAMUSCULAR; INTRAVENOUS at 11:03

## 2020-03-11 RX ADMIN — FUROSEMIDE 40 MG: 10 INJECTION, SOLUTION INTRAVENOUS at 05:03

## 2020-03-11 RX ADMIN — IPRATROPIUM BROMIDE AND ALBUTEROL SULFATE 3 ML: .5; 3 SOLUTION RESPIRATORY (INHALATION) at 11:03

## 2020-03-11 NOTE — ED NOTES
Patient stated that he was recently treated for pneumonia. Patient presents to ED today with cc of SOB, fever non productive cough. Respiratory therapist at bedside for treatment. Patient changed into gown and no increased work of breathing, O2 saturations on RA 93%. Breath sounds in all fields clear. EKG performed. Will continue to monitor

## 2020-03-11 NOTE — SUBJECTIVE & OBJECTIVE
Past Medical History:   Diagnosis Date    Dermatitis     Hypertension        Past Surgical History:   Procedure Laterality Date    CORONARY ANGIOGRAPHY N/A 2/17/2020    Procedure: ANGIOGRAM, CORONARY ARTERY;  Surgeon: Tomy Patterson MD;  Location: [facility] CATH LAB/EP;  Service: Cardiology;  Laterality: N/A;    HAND SURGERY      LEFT HEART CATHETERIZATION N/A 2/17/2020    Procedure: Left heart cath;  Surgeon: Tomy Patterson MD;  Location: [facility] CATH LAB/EP;  Service: Cardiology;  Laterality: N/A;    MANDIBLE FRACTURE SURGERY         Review of patient's allergies indicates:  No Known Allergies    No current facility-administered medications on file prior to encounter.      Current Outpatient Medications on File Prior to Encounter   Medication Sig    amlodipine (NORVASC) 5 MG tablet Take 1 tablet (5 mg total) by mouth once daily.    cetirizine (ZYRTEC) 10 MG tablet Take 1 tablet (10 mg total) by mouth once daily.    clobetasol 0.05% (TEMOVATE) 0.05 % Oint Apply topically 2 (two) times daily.    furosemide (LASIX) 20 MG tablet Take 1 tablet (20 mg total) by mouth once daily.    HYDROcodone-acetaminophen (NORCO) 5-325 mg per tablet Take 1 tablet by mouth every 4 (four) hours as needed for Pain. (Patient not taking: Reported on 2/26/2020)    ibuprofen (ADVIL,MOTRIN) 600 MG tablet Take 1 tablet (600 mg total) by mouth every 6 (six) hours as needed for Pain. (Patient not taking: Reported on 2/26/2020)    levoFLOXacin (LEVAQUIN) 750 MG tablet Take 1 tablet (750 mg total) by mouth once daily.    polyethylene glycol (GLYCOLAX) 17 gram/dose powder mix and take 17 g ( 1 capful )  by mouth once daily as directed (Patient not taking: Reported on 2/26/2020)     Family History     None        Tobacco Use    Smoking status: Current Every Day Smoker     Packs/day: 1.00     Types: Cigarettes    Smokeless tobacco: Never Used   Substance and Sexual Activity    Alcohol use: Yes     Alcohol/week: 6.0 standard drinks      Types: 6 Cans of beer per week     Comment: occ    Drug use: No    Sexual activity: Not on file     Review of Systems   Constitutional: Positive for fever. Negative for chills.   HENT: Negative for ear discharge, sinus pressure and tinnitus.    Eyes: Negative for pain and itching.   Respiratory: Positive for shortness of breath. Negative for apnea.    Cardiovascular: Negative for chest pain and leg swelling.   Gastrointestinal: Positive for nausea and vomiting. Negative for abdominal distention and abdominal pain.   Genitourinary: Negative for dysuria and urgency.   Musculoskeletal: Positive for back pain.   Neurological: Negative for syncope and light-headedness.     Objective:     Vital Signs (Most Recent):  Temp: 98 °F (36.7 °C) (03/11/20 1355)  Pulse: 89 (03/11/20 1600)  Resp: 16 (03/11/20 1600)  BP: (!) 147/103 (03/11/20 1600)  SpO2: 97 % (03/11/20 1600) Vital Signs (24h Range):  Temp:  [97.7 °F (36.5 °C)-98 °F (36.7 °C)] 98 °F (36.7 °C)  Pulse:  [] 89  Resp:  [12-25] 16  SpO2:  [86 %-97 %] 97 %  BP: (145-162)/(103-110) 147/103     Weight: 61.2 kg (135 lb)  Body mass index is 17.33 kg/m².    Physical Exam   Constitutional: He is oriented to person, place, and time. He appears well-developed and well-nourished.   HENT:   Head: Normocephalic and atraumatic.   Eyes: Pupils are equal, round, and reactive to light. EOM are normal.   Neck: Normal range of motion. Neck supple.   Cardiovascular: Normal rate, regular rhythm and normal heart sounds. Exam reveals no gallop and no friction rub.   No murmur heard.  Pulmonary/Chest: Effort normal. He has wheezes. He exhibits no tenderness.   NC Oxygen    Abdominal: Soft. Bowel sounds are normal. There is no tenderness.   Mildly distended   Musculoskeletal: He exhibits no edema or tenderness.   Neurological: He is alert and oriented to person, place, and time.   Skin: Skin is warm. Capillary refill takes less than 2 seconds.   Psychiatric: He has a normal mood and  affect.         CRANIAL NERVES     CN III, IV, VI   Pupils are equal, round, and reactive to light.  Extraocular motions are normal.        Significant Labs:   ABGs: No results for input(s): PH, PCO2, HCO3, POCSATURATED, BE, TOTALHB, COHB, METHB, O2HB, POCFIO2 in the last 48 hours.  CBC:   Recent Labs   Lab 03/11/20  1119   WBC 4.10   HGB 13.4*   HCT 41.6        CMP:   Recent Labs   Lab 03/11/20  1119      K 4.3      CO2 24   *   BUN 16   CREATININE 1.2   CALCIUM 8.9   PROT 7.6   ALBUMIN 3.7   BILITOT 1.2*   ALKPHOS 100   AST 42*   ALT 28   ANIONGAP 9   EGFRNONAA >60     Cardiac Markers:   Recent Labs   Lab 03/11/20  1119   BNP 2,667*     Magnesium: No results for input(s): MG in the last 48 hours.  Troponin:   Recent Labs   Lab 03/11/20  1119 03/11/20  1355   TROPONINI 0.131* 0.121*     TSH: No results for input(s): TSH in the last 4320 hours.    Significant Imaging: I have reviewed all pertinent imaging results/findings within the past 24 hours.

## 2020-03-11 NOTE — ED NOTES
Patient placed on 2L/NC.  NP notified. Patient O2 saturations decreased, no SOB reported. Jessica SANCHEZ stated patient is ok to eat at present time

## 2020-03-11 NOTE — ASSESSMENT & PLAN NOTE
Severe mitral regurgitation  Essential hypertension  Acute pulmonary edema  oxygen sat was 86 % on admission, improved to 97% on 2 L of oxygen  BNP 2667,   Low normal left ventricular systolic function, EF 45%, severe 4+ MR with severely dilated LA. Grade III (severe) left ventricular diastolic dysfunction consistent with restrictive physiology  Severe mitral regurgitation  CXR with CHF with mild interstitial edema, similar but improved compared to the prior study.  Continue IV lasix

## 2020-03-11 NOTE — ASSESSMENT & PLAN NOTE
Echo and card cath on 2/17/2020 reported   Low normal left ventricular systolic function, EF 45%, severe 4+ MR with severely dilated LA. Grade III (severe) left ventricular diastolic dysfunction consistent with restrictive physiology  Severe mitral regurgitation

## 2020-03-11 NOTE — H&P
[facility] Hospital Medicine  History & Physical    Patient Name: Anirudh David  MRN: [id number]  Admission Date: 3/11/2020  Attending Physician: Carol Wright MD  Primary Care Provider: Ari No MD         Patient information was obtained from patient and ER records.     Subjective:     Principal Problem:Congestive heart failure    Chief Complaint:   Chief Complaint   Patient presents with    Shortness of Breath     Reports recent admit for pneumonia, continued SoB, cough, NVD        HPI: Frankie Oleary is a 58 y/o M with PMH of dermatitis, hypertension, smoker, recent cardiac catheterization (2/17) due to NSTEMI, multiple admission in the past month for pulm edema, bilateral pneumonia, present to [facility] with 2 weeks history of progressive SOB with associated nausea, vomiting, generalized body aches, and subjective fever.  In the ED his oxygen sat was 86 % improved to 97% on 2 L of oxygen, BNP 2667, CXR with CHF with mild interstitial edema, similar but improved compared to the prior study.    Past Medical History:   Diagnosis Date    Dermatitis     Hypertension        Past Surgical History:   Procedure Laterality Date    CORONARY ANGIOGRAPHY N/A 2/17/2020    Procedure: ANGIOGRAM, CORONARY ARTERY;  Surgeon: Tomy Patterson MD;  Location: [facility] CATH LAB/EP;  Service: Cardiology;  Laterality: N/A;    HAND SURGERY      LEFT HEART CATHETERIZATION N/A 2/17/2020    Procedure: Left heart cath;  Surgeon: Tomy Patterson MD;  Location: [facility] CATH LAB/EP;  Service: Cardiology;  Laterality: N/A;    MANDIBLE FRACTURE SURGERY         Review of patient's allergies indicates:  No Known Allergies    No current facility-administered medications on file prior to encounter.      Current Outpatient Medications on File Prior to Encounter   Medication Sig    amlodipine (NORVASC) 5 MG tablet Take 1 tablet (5 mg total) by mouth once daily.    cetirizine (ZYRTEC) 10 MG tablet  Take 1 tablet (10 mg total) by mouth once daily.    clobetasol 0.05% (TEMOVATE) 0.05 % Oint Apply topically 2 (two) times daily.    furosemide (LASIX) 20 MG tablet Take 1 tablet (20 mg total) by mouth once daily.    HYDROcodone-acetaminophen (NORCO) 5-325 mg per tablet Take 1 tablet by mouth every 4 (four) hours as needed for Pain. (Patient not taking: Reported on 2/26/2020)    ibuprofen (ADVIL,MOTRIN) 600 MG tablet Take 1 tablet (600 mg total) by mouth every 6 (six) hours as needed for Pain. (Patient not taking: Reported on 2/26/2020)    levoFLOXacin (LEVAQUIN) 750 MG tablet Take 1 tablet (750 mg total) by mouth once daily.    polyethylene glycol (GLYCOLAX) 17 gram/dose powder mix and take 17 g ( 1 capful )  by mouth once daily as directed (Patient not taking: Reported on 2/26/2020)     Family History     None        Tobacco Use    Smoking status: Current Every Day Smoker     Packs/day: 1.00     Types: Cigarettes    Smokeless tobacco: Never Used   Substance and Sexual Activity    Alcohol use: Yes     Alcohol/week: 6.0 standard drinks     Types: 6 Cans of beer per week     Comment: occ    Drug use: No    Sexual activity: Not on file     Review of Systems   Constitutional: Positive for fever. Negative for chills.   HENT: Negative for ear discharge, sinus pressure and tinnitus.    Eyes: Negative for pain and itching.   Respiratory: Positive for shortness of breath. Negative for apnea.    Cardiovascular: Negative for chest pain and leg swelling.   Gastrointestinal: Positive for nausea and vomiting. Negative for abdominal distention and abdominal pain.   Genitourinary: Negative for dysuria and urgency.   Musculoskeletal: Positive for back pain.   Neurological: Negative for syncope and light-headedness.     Objective:     Vital Signs (Most Recent):  Temp: 98 °F (36.7 °C) (03/11/20 1355)  Pulse: 89 (03/11/20 1600)  Resp: 16 (03/11/20 1600)  BP: (!) 147/103 (03/11/20 1600)  SpO2: 97 % (03/11/20 1600) Vital Signs  (24h Range):  Temp:  [97.7 °F (36.5 °C)-98 °F (36.7 °C)] 98 °F (36.7 °C)  Pulse:  [] 89  Resp:  [12-25] 16  SpO2:  [86 %-97 %] 97 %  BP: (145-162)/(103-110) 147/103     Weight: 61.2 kg (135 lb)  Body mass index is 17.33 kg/m².    Physical Exam   Constitutional: He is oriented to person, place, and time. He appears well-developed and well-nourished.   HENT:   Head: Normocephalic and atraumatic.   Eyes: Pupils are equal, round, and reactive to light. EOM are normal.   Neck: Normal range of motion. Neck supple.   Cardiovascular: Normal rate, regular rhythm and normal heart sounds. Exam reveals no gallop and no friction rub.   No murmur heard.  Pulmonary/Chest: Effort normal. He has wheezes. He exhibits no tenderness.   NC Oxygen    Abdominal: Soft. Bowel sounds are normal. There is no tenderness.   Mildly distended   Musculoskeletal: He exhibits no edema or tenderness.   Neurological: He is alert and oriented to person, place, and time.   Skin: Skin is warm. Capillary refill takes less than 2 seconds.   Psychiatric: He has a normal mood and affect.         CRANIAL NERVES     CN III, IV, VI   Pupils are equal, round, and reactive to light.  Extraocular motions are normal.        Significant Labs:   ABGs: No results for input(s): PH, PCO2, HCO3, POCSATURATED, BE, TOTALHB, COHB, METHB, O2HB, POCFIO2 in the last 48 hours.  CBC:   Recent Labs   Lab 03/11/20  1119   WBC 4.10   HGB 13.4*   HCT 41.6        CMP:   Recent Labs   Lab 03/11/20  1119      K 4.3      CO2 24   *   BUN 16   CREATININE 1.2   CALCIUM 8.9   PROT 7.6   ALBUMIN 3.7   BILITOT 1.2*   ALKPHOS 100   AST 42*   ALT 28   ANIONGAP 9   EGFRNONAA >60     Cardiac Markers:   Recent Labs   Lab 03/11/20  1119   BNP 2,667*     Magnesium: No results for input(s): MG in the last 48 hours.  Troponin:   Recent Labs   Lab 03/11/20  1119 03/11/20  1355   TROPONINI 0.131* 0.121*     TSH: No results for input(s): TSH in the last 4320  hours.    Significant Imaging: I have reviewed all pertinent imaging results/findings within the past 24 hours.    Assessment/Plan:     * Congestive heart failure  Severe mitral regurgitation  Essential hypertension  Acute pulmonary edema  As clarification, on 3/11/75948 patient should be admitted for hospital observation services under my care  oxygen sat was 86 % on admission, improved to 97% on 2 L of oxygen  BNP 2667,   Low normal left ventricular systolic function, EF 45%, severe 4+ MR with severely dilated LA. Grade III (severe) left ventricular diastolic dysfunction consistent with restrictive physiology  Severe mitral regurgitation  CXR with CHF with mild interstitial edema, similar but improved compared to the prior study.  Continue IV lasix      Severe mitral regurgitation  Echo and card cath on 2/17/2020 reported   Low normal left ventricular systolic function, EF 45%, severe 4+ MR with severely dilated LA. Grade III (severe) left ventricular diastolic dysfunction consistent with restrictive physiology  Severe mitral regurgitation    Lumbar strain, initial encounter  He noted he has an appointment for his back at 1 pm on 3/12        VTE Risk Mitigation (From admission, onward)         Ordered     enoxaparin injection 40 mg  Daily      03/11/20 1646     IP VTE HIGH RISK PATIENT  Once      03/11/20 1646                   Carol Wright MD  Department of Hospital Medicine   [facility]

## 2020-03-11 NOTE — PLAN OF CARE
VN note: VN cued into patient's room for introduction. VN informed patient that VN would be working closely along side bedside nurse, PCT, and the rest of the care team and making rounds throughout the shift. Patient verbalized understanding. Allowed time for questions. VN will continue to be available to patient and intervene prn.      Admission questions completed. Home medications reviewed.

## 2020-03-11 NOTE — ED PROVIDER NOTES
Encounter Date: 3/11/2020    SCRIBE #1 NOTE: I, Liseth Villanueva, am scribing for, and in the presence of, Niurka Alonzo.       History     Chief Complaint   Patient presents with    Shortness of Breath     Reports recent admit for pneumonia, continued SoB, cough, NVD     Time seen by provider: 11:00 AM    This is a 59 y.o. male with a hx of pneumonia who presents with complaint of worsening SOB since 2/21/2020. He also reports vomiting, diarrhea, body aches, subjective fever and chills. He received a cardiac catheterization on February 17th due to a NSTEMI. He was discharged on 2/21/2020 after being diagnosed with bilateral pneumonia, pulmonary edema and a NSTEMI. He was treated with Levaquin and Lasix. He then reported here again on March 5th 2020 for similar symptoms and states that his current symptoms have been worsening since then. He did take all of his antibiotics which did not help. Today he states that he vomited twice and he has been noticing specks of blood in his vomit. The patient admits to a hx of smoking and thinks that he may be developing COPD. He denies taking any pain medication today but he has been taking his fluid pills. He states that he feels like he needs a  breathing treatment.  No home oxygen use.  No other complaitns at si time.      The history is provided by the patient.     Review of patient's allergies indicates:  No Known Allergies  Past Medical History:   Diagnosis Date    Dermatitis     Hypertension      Past Surgical History:   Procedure Laterality Date    CORONARY ANGIOGRAPHY N/A 2/17/2020    Procedure: ANGIOGRAM, CORONARY ARTERY;  Surgeon: Tomy Patterson MD;  Location: [facility] CATH LAB/EP;  Service: Cardiology;  Laterality: N/A;    HAND SURGERY      LEFT HEART CATHETERIZATION N/A 2/17/2020    Procedure: Left heart cath;  Surgeon: Tomy Patterson MD;  Location: [facility] CATH LAB/EP;  Service: Cardiology;  Laterality: N/A;    MANDIBLE FRACTURE SURGERY       History  reviewed. No pertinent family history.  Social History     Tobacco Use    Smoking status: Current Every Day Smoker     Packs/day: 1.00     Types: Cigarettes    Smokeless tobacco: Never Used   Substance Use Topics    Alcohol use: Yes     Alcohol/week: 6.0 standard drinks     Types: 6 Cans of beer per week     Comment: occ    Drug use: No     Review of Systems   Constitutional: Positive for chills and fever.   HENT: Negative for sore throat.    Respiratory: Positive for shortness of breath.    Cardiovascular: Negative for chest pain.   Gastrointestinal: Positive for diarrhea and vomiting. Negative for nausea.        Positive for hematemesis.    Genitourinary: Negative for dysuria.   Musculoskeletal: Positive for myalgias. Negative for back pain.   Skin: Negative for rash.   Neurological: Negative for weakness.   Hematological: Does not bruise/bleed easily.   All other systems reviewed and are negative.      Physical Exam     Initial Vitals [03/11/20 1056]   BP Pulse Resp Temp SpO2   (!) 162/110 91 (!) 24 97.7 °F (36.5 °C) 97 %      MAP       --         Physical Exam    Nursing note and vitals reviewed.  Constitutional: He appears well-developed and well-nourished. He is not diaphoretic. No distress.   HENT:   Head: Normocephalic and atraumatic.   Right Ear: External ear normal.   Left Ear: External ear normal.   Eyes: Right eye exhibits no discharge. Left eye exhibits no discharge.   Neck: Normal range of motion. Neck supple.   Cardiovascular: Normal rate, regular rhythm, normal heart sounds and intact distal pulses. Exam reveals no gallop and no friction rub.    No murmur heard.  Pulmonary/Chest: No accessory muscle usage. Tachypnea noted. No respiratory distress. He has wheezes (expiratory bilateral). He has no rhonchi. He has no rales. He exhibits no tenderness.   Pt speaking in full sentences.    Abdominal: Soft. Normal appearance and bowel sounds are normal. He exhibits distension. He exhibits no ascites.  There is no tenderness. There is no rebound and no guarding.   Musculoskeletal: Normal range of motion. He exhibits no edema or tenderness.        Right lower leg: He exhibits no edema.        Left lower leg: He exhibits no edema.   Lymphadenopathy:     He has no cervical adenopathy.   Neurological: He is alert and oriented to person, place, and time. He has normal strength. No sensory deficit.   Skin: Skin is warm and dry. No rash noted. No erythema.   Psychiatric: He has a normal mood and affect. His behavior is normal. Judgment and thought content normal.         ED Course   Procedures  Labs Reviewed   CBC W/ AUTO DIFFERENTIAL - Abnormal; Notable for the following components:       Result Value    RBC 4.51 (*)     Hemoglobin 13.4 (*)     All other components within normal limits   COMPREHENSIVE METABOLIC PANEL - Abnormal; Notable for the following components:    Glucose 152 (*)     Total Bilirubin 1.2 (*)     AST 42 (*)     All other components within normal limits   TROPONIN I - Abnormal; Notable for the following components:    Troponin I 0.131 (*)     All other components within normal limits   B-TYPE NATRIURETIC PEPTIDE - Abnormal; Notable for the following components:    BNP 2,667 (*)     All other components within normal limits   TROPONIN I - Abnormal; Notable for the following components:    Troponin I 0.121 (*)     All other components within normal limits   INFLUENZA A & B BY MOLECULAR          Imaging Results          X-Ray Chest PA And Lateral (Final result)  Result time 03/11/20 13:10:22    Final result by Ramirez Thorne Jr., MD (03/11/20 13:10:22)                 Impression:      CHF with mild interstitial edema, similar but improved compared to the prior study.      Electronically signed by: Ramirez Garcia Jr  Date:    03/11/2020  Time:    13:10             Narrative:    EXAMINATION:  XR CHEST PA AND LATERAL    CLINICAL HISTORY:  Shortness of breath    TECHNIQUE:  PA and lateral views of  the chest were performed.    COMPARISON:  02/20/2020    FINDINGS:  The cardiac silhouette is enlarged.  The hilar and mediastinal contours are unremarkable.    Compared with the prior study bilateral perihilar vascular congestion and perihilar interstitial and alveolar opacity appear similar but slightly improved.    Small bibasilar pleural effusions are present.  Bones unremarkable.                                 Medical Decision Making:   History:   Old Medical Records: I decided to obtain old medical records.  Differential Diagnosis:   CHF, COPD, infection, electrolyte derangement, dehydration, pleural effusion  Independently Interpreted Test(s):   I have ordered and independently interpreted EKG Reading(s) - see prior notes  Clinical Tests:   Lab Tests: Ordered and Reviewed  Medical Tests: Ordered and Reviewed  ED Management:  Labs, EKG, CXR, DuoNeb, Solumedrol      Other:   I have discussed this case with another health care provider.       <> Summary of the Discussion: Discussed with  who agrees with ED course and disposition.   11:57 AM  Pt reports feeling improved after DuoNeb.   BNP elevated and CXR shows pulmonary edema.   Normal renal function.  Pt recently had echo (2/17/20) which noted LV EF 45-50%.  Pt was given 80mg lasix IV.     Trop mildly elevated, will repeat.    1300:  Pt states that he is feeling much better after lasix and has had about 1L UOP.  Pt is requesting food.    1400:  Pt reports his breathing has improved, but pt's SaO2 decreases to 88% on RA while at rest.  Pt was placed on 2LPM NC and SaO2 96%.  Repeat troponin improved.  Pt will be admitted to [facility] for CHF exacerbation.  Pt agreeable to plan.             Scribe Attestation:   Scribe #1: I performed the above scribed service and the documentation accurately describes the services I performed. I attest to the accuracy of the note.    Attending Attestation:     Physician Attestation Statement for NP/PA:   I discussed  this assessment and plan of this patient with the NP/PA, but I did not personally examine the patient. The face to face encounter was performed by the NP/PA.        Physician Attestation for Scribe:  Physician Attestation Statement for Scribe #1: I, Niurka Alonzo, reviewed documentation, as scribed by Liseth Villanueva  in my presence, and it is both accurate and complete.                 ED Course as of Mar 11 1602   Wed Mar 11, 2020   1515 Patient will be admitted.     [BL]      ED Course User Index  [BL] Liseth Villanueva                Clinical Impression:     1. Congestive heart failure, unspecified HF chronicity, unspecified heart failure type    2. SOB (shortness of breath)    3. Hypoxia                                   Niurka Alonzo, AMY  03/11/20 1515       Sin Plascencia MD  03/11/20 1602

## 2020-03-11 NOTE — HPI
Frankie Oleary is a 58 y/o M with PMH of dermatitis, hypertension, smoker, recent cardiac catheterization (2/17) due to NSTEMI, multiple admission in the past month for pulm edema, bilateral pneumonia, present to [facility] with 2 weeks history of progressive SOB with associated nausea, vomiting, generalized body aches, and subjective fever.  In the ED his oxygen sat was 86 % improved to 97% on 2 L of oxygen, BNP 2667, CXR with CHF with mild interstitial edema, similar but improved compared to the prior study.

## 2020-03-12 ENCOUNTER — CLINICAL SUPPORT (OUTPATIENT)
Dept: SMOKING CESSATION | Facility: CLINIC | Age: 60
End: 2020-03-12
Payer: COMMERCIAL

## 2020-03-12 VITALS
DIASTOLIC BLOOD PRESSURE: 70 MMHG | TEMPERATURE: 98 F | OXYGEN SATURATION: 97 % | BODY MASS INDEX: 16.89 KG/M2 | WEIGHT: 131.63 LBS | HEART RATE: 91 BPM | HEIGHT: 74 IN | SYSTOLIC BLOOD PRESSURE: 130 MMHG | RESPIRATION RATE: 18 BRPM

## 2020-03-12 DIAGNOSIS — F17.210 CIGARETTE SMOKER: Primary | ICD-10-CM

## 2020-03-12 LAB
ANION GAP SERPL CALC-SCNC: 11 MMOL/L (ref 8–16)
BUN SERPL-MCNC: 26 MG/DL (ref 6–20)
CALCIUM SERPL-MCNC: 8.6 MG/DL (ref 8.7–10.5)
CHLORIDE SERPL-SCNC: 101 MMOL/L (ref 95–110)
CO2 SERPL-SCNC: 28 MMOL/L (ref 23–29)
CREAT SERPL-MCNC: 1.2 MG/DL (ref 0.5–1.4)
EST. GFR  (AFRICAN AMERICAN): >60 ML/MIN/1.73 M^2
EST. GFR  (NON AFRICAN AMERICAN): >60 ML/MIN/1.73 M^2
GLUCOSE SERPL-MCNC: 129 MG/DL (ref 70–110)
POTASSIUM SERPL-SCNC: 3.8 MMOL/L (ref 3.5–5.1)
SODIUM SERPL-SCNC: 140 MMOL/L (ref 136–145)

## 2020-03-12 PROCEDURE — S4991 NICOTINE PATCH NONLEGEND: HCPCS | Performed by: FAMILY MEDICINE

## 2020-03-12 PROCEDURE — 99999 PR PBB SHADOW E&M-EST. PATIENT-LVL II: CPT | Mod: PBBFAC,,,

## 2020-03-12 PROCEDURE — G0378 HOSPITAL OBSERVATION PER HR: HCPCS

## 2020-03-12 PROCEDURE — 96376 TX/PRO/DX INJ SAME DRUG ADON: CPT

## 2020-03-12 PROCEDURE — 94761 N-INVAS EAR/PLS OXIMETRY MLT: CPT

## 2020-03-12 PROCEDURE — 99407 BEHAV CHNG SMOKING > 10 MIN: CPT | Mod: S$GLB,,,

## 2020-03-12 PROCEDURE — 36415 COLL VENOUS BLD VENIPUNCTURE: CPT

## 2020-03-12 PROCEDURE — 97802 MEDICAL NUTRITION INDIV IN: CPT

## 2020-03-12 PROCEDURE — 25000003 PHARM REV CODE 250: Performed by: FAMILY MEDICINE

## 2020-03-12 PROCEDURE — 99999 PR PBB SHADOW E&M-EST. PATIENT-LVL II: ICD-10-PCS | Mod: PBBFAC,,,

## 2020-03-12 PROCEDURE — 80048 BASIC METABOLIC PNL TOTAL CA: CPT

## 2020-03-12 PROCEDURE — 99407 PR TOBACCO USE CESSATION INTENSIVE >10 MINUTES: ICD-10-PCS | Mod: S$GLB,,,

## 2020-03-12 PROCEDURE — 63600175 PHARM REV CODE 636 W HCPCS: Performed by: FAMILY MEDICINE

## 2020-03-12 RX ORDER — FUROSEMIDE 20 MG/1
20 TABLET ORAL 2 TIMES DAILY
Qty: 60 TABLET | Refills: 11 | Status: ON HOLD | OUTPATIENT
Start: 2020-03-12 | End: 2020-01-01 | Stop reason: SDUPTHER

## 2020-03-12 RX ORDER — IBUPROFEN 200 MG
1 TABLET ORAL DAILY
Qty: 28 PATCH | Refills: 0 | Status: ON HOLD | OUTPATIENT
Start: 2020-03-12 | End: 2020-01-01 | Stop reason: HOSPADM

## 2020-03-12 RX ORDER — IBUPROFEN 200 MG
1 TABLET ORAL DAILY
Status: DISCONTINUED | OUTPATIENT
Start: 2020-03-12 | End: 2020-03-12 | Stop reason: HOSPADM

## 2020-03-12 RX ADMIN — NICOTINE 1 PATCH: 21 PATCH, EXTENDED RELEASE TRANSDERMAL at 11:03

## 2020-03-12 RX ADMIN — STANDARDIZED SENNA CONCENTRATE AND DOCUSATE SODIUM 1 TABLET: 8.6; 5 TABLET ORAL at 08:03

## 2020-03-12 RX ADMIN — FUROSEMIDE 40 MG: 10 INJECTION, SOLUTION INTRAVENOUS at 05:03

## 2020-03-12 NOTE — PLAN OF CARE
20 gauge PIV to left forearm removed without difficulty, catheter intact- no redness or swelling noted at time of removal. Tele monitor removed, cleaned, and returned to telemetry bunker. D/C instructions and medications reviewed with patient- verbalizes understanding. NADN at time of D/C.

## 2020-03-12 NOTE — SUBJECTIVE & OBJECTIVE
Interval History: awake and alert, on RA . Yvette, requesting to be discharge to go for his back appointment at 1pm. Possible discharge pending cardiology rec's     Review of Systems   Constitutional: Negative for chills and fever.   Respiratory: Negative for apnea and shortness of breath.    Cardiovascular: Negative for chest pain and leg swelling.   Gastrointestinal: Negative for abdominal distention, abdominal pain, nausea and vomiting.   Genitourinary: Negative for dysuria and urgency.   Musculoskeletal: Negative for back pain.   Neurological: Negative for syncope and light-headedness.     Objective:     Vital Signs (Most Recent):  Temp: 97.9 °F (36.6 °C) (03/12/20 0817)  Pulse: 91 (03/12/20 0817)  Resp: 18 (03/12/20 0817)  BP: 130/70 (03/12/20 0817)  SpO2: 98 % (03/12/20 0817) Vital Signs (24h Range):  Temp:  [96.8 °F (36 °C)-98.1 °F (36.7 °C)] 97.9 °F (36.6 °C)  Pulse:  [] 91  Resp:  [12-25] 18  SpO2:  [86 %-98 %] 98 %  BP: (105-162)/() 130/70     Weight: 59.7 kg (131 lb 9.8 oz)  Body mass index is 16.9 kg/m².    Intake/Output Summary (Last 24 hours) at 3/12/2020 0941  Last data filed at 3/12/2020 0700  Gross per 24 hour   Intake 490 ml   Output 5250 ml   Net -4760 ml      Physical Exam   Constitutional: He is oriented to person, place, and time. He appears well-developed and well-nourished.   HENT:   Head: Normocephalic and atraumatic.   Eyes: EOM are normal.   Neck: Neck supple.   Cardiovascular: Normal rate, regular rhythm and normal heart sounds. Exam reveals no gallop and no friction rub.   No murmur heard.  Pulmonary/Chest: Effort normal. He has no wheezes. He exhibits no tenderness.   Abdominal: Soft. Bowel sounds are normal. He exhibits no distension. There is no tenderness.   Musculoskeletal: He exhibits no edema or tenderness.   Neurological: He is alert and oriented to person, place, and time.   Skin: Skin is warm. Capillary refill takes less than 2 seconds.   Psychiatric: He has a  normal mood and affect.       Significant Labs:   BMP:   Recent Labs   Lab 03/11/20  1820  03/12/20  0411   GLU  --   --  129*   NA  --   --  140   K  --    < > 3.8   CL  --   --  101   CO2  --   --  28   BUN  --   --  26*   CREATININE  --   --  1.2   CALCIUM  --   --  8.6*   MG 2.0  --   --     < > = values in this interval not displayed.     CBC:   Recent Labs   Lab 03/11/20  1119   WBC 4.10   HGB 13.4*   HCT 41.6        Urine Culture: No results for input(s): LABURIN in the last 48 hours.  Urine Studies: No results for input(s): COLORU, APPEARANCEUA, PHUR, SPECGRAV, PROTEINUA, GLUCUA, KETONESU, BILIRUBINUA, OCCULTUA, NITRITE, UROBILINOGEN, LEUKOCYTESUR, RBCUA, WBCUA, BACTERIA, SQUAMEPITHEL, HYALINECASTS in the last 48 hours.    Invalid input(s): ANA ROSA    Significant Imaging: I have reviewed all pertinent imaging results/findings within the past 24 hours.

## 2020-03-12 NOTE — DISCHARGE SUMMARY
[facility] Hospital Medicine  Discharge Summary      Patient Name: Anirudh David  MRN: [id number]  Admission Date: 3/11/2020  Hospital Length of Stay: 0 days  Discharge Date and Time: 3/12/2020  1:14 PM  Attending Physician: Carol Wright*   Discharging Provider: Carol Wright MD  Primary Care Provider: Ari No MD      HPI:   Frankie Oleary is a 60 y/o M with PMH of dermatitis, hypertension, smoker, recent cardiac catheterization (2/17) due to NSTEMI, multiple admission in the past month for pulm edema, bilateral pneumonia, present to [facility] with 2 weeks history of progressive SOB with associated nausea, vomiting, generalized body aches, and subjective fever.  In the ED his oxygen sat was 86 % improved to 97% on 2 L of oxygen, BNP 2667, CXR with CHF with mild interstitial edema, similar but improved compared to the prior study.    * No surgery found *      Hospital Course:   No notes on file     Consults:   Consults (From admission, onward)        Status Ordering Provider     Inpatient consult to Registered Dietitian/Nutritionist  Once     Provider:  (Not yet assigned)    Acknowledged CAROL WRIGHT     Inpatient consult to Social Work/Case Management  Once     Provider:  (Not yet assigned)    Acknowledged CAROL WRIGHT.          * Congestive heart failure  Severe mitral regurgitation  Essential hypertension  Acute pulmonary edema  oxygen sat was 86 % on admission, improved to 97% on 2 L of oxygen  BNP 2667,   Low normal left ventricular systolic function, EF 45%, severe 4+ MR with severely dilated LA. Grade III (severe) left ventricular diastolic dysfunction consistent with restrictive physiology  Severe mitral regurgitation  CXR with CHF with mild interstitial edema, similar but improved compared to the prior study.  Continue IV lasix,  Switched to PO at discharge  Appreciates cardiology- FU with [facility] with CTS as  previously recommended in previous admissions      Lumbar strain, initial encounter  He noted he has an appointment for his back at 1 pm on 3/12        Final Active Diagnoses:    Diagnosis Date Noted POA    PRINCIPAL PROBLEM:  Congestive heart failure [I50.9] 03/11/2020 Yes    Smoker [F17.200] 03/11/2020 Yes    Severe mitral regurgitation [I34.0] 02/21/2020 Yes    Essential hypertension [I10] 02/21/2020 Yes    Acute pulmonary edema [J81.0] 02/20/2020 Yes    Lumbar strain, initial encounter [S39.012A] 09/07/2019 Yes      Problems Resolved During this Admission:       Discharged Condition: stable    Disposition: Home or Self Care    Follow Up:  Follow-up Information     Ari No MD In 1 week.    Specialty:  Family Medicine  Contact information:  Sang [address]NALES [id number] [phone]                 Patient Instructions:      Ambulatory referral/consult to Cardiothoracic Surgery   Standing Status: Future   Referral Priority: Routine Referral Type: Consultation   Referral Reason: Specialty Services Required   Requested Specialty: Cardiothoracic Surgery   Number of Visits Requested: 1     Diet Cardiac     Activity as tolerated       Significant Diagnostic Studies:     Pending Diagnostic Studies:     None         Medications:  Reconciled Home Medications:      Medication List      START taking these medications    nicotine 21 mg/24 hr  Commonly known as:  NICODERM CQ  Place 1 patch onto the skin once daily.        CHANGE how you take these medications    furosemide 20 MG tablet  Commonly known as:  LASIX  Take 1 tablet (20 mg total) by mouth 2 (two) times daily.  What changed:  when to take this        CONTINUE taking these medications    amLODIPine 5 MG tablet  Commonly known as:  NORVASC  Take 1 tablet (5 mg total) by mouth once daily.     cetirizine 10 MG tablet  Commonly known as:  ZYRTEC  Take 1 tablet (10 mg total) by mouth once daily.     HYDROcodone-acetaminophen 5-325 mg per  tablet  Commonly known as:  NORCO  Take 1 tablet by mouth every 4 (four) hours as needed for Pain.     ibuprofen 600 MG tablet  Commonly known as:  ADVIL,MOTRIN  Take 1 tablet (600 mg total) by mouth every 6 (six) hours as needed for Pain.     polyethylene glycol 17 gram/dose powder  Commonly known as:  GLYCOLAX  mix and take 17 g ( 1 capful )  by mouth once daily as directed        STOP taking these medications    clobetasol 0.05% 0.05 % Oint  Commonly known as:  TEMOVATE     levoFLOXacin 750 MG tablet  Commonly known as:  LEVAQUIN            Indwelling Lines/Drains at time of discharge:   Lines/Drains/Airways     None                 Time spent on the discharge of patient: 35 minutes  Patient was seen and examined on the date of discharge and determined to be suitable for discharge.         Carol Wright MD  Department of Hospital Medicine  [facility]

## 2020-03-12 NOTE — PLAN OF CARE
Visit with pt for d/c planning.  Pt will d/c home today, eager to make his 1pm pain management appt for his back.  Pt will have transportation home, ambulates independently, independent with self care.  Pt states he has no additional needs.      CM reviewed heart failure booklet with pt, discussed need to be consistent with medications, verbalized understanding.  Stressed importance of keeping f/u appt in clinic.       03/12/20 1041   Discharge Assessment   Assessment Type Discharge Planning Assessment   Confirmed/corrected address and phone number on facesheet? Yes   Assessment information obtained from? Patient   Expected Length of Stay (days) 1   Communicated expected length of stay with patient/caregiver yes   Prior to hospitilization cognitive status: Alert/Oriented   Prior to hospitalization functional status: Independent   Current cognitive status: Alert/Oriented   Current Functional Status: Independent   Able to Return to Prior Arrangements yes   Is patient able to care for self after discharge? Yes   Readmission Within the Last 30 Days no previous admission in last 30 days;previous discharge plan unsuccessful   Patient currently being followed by outpatient case management? No   Patient currently receives any other outside agency services? No   Equipment Currently Used at Home none   Do you have any problems affording any of your prescribed medications? No   Is the patient taking medications as prescribed? yes   Does the patient have transportation home? Yes   Transportation Anticipated family or friend will provide   Discharge Plan A Home   Discharge Plan B Home with family   DME Needed Upon Discharge  none   Patient/Family in Agreement with Plan yes   Readmission Questionnaire   At the time of your discharge, did someone talk to you about what your health problems were? Yes   At the time of discharge, did someone talk to you about what to watch out for regarding worsening of your health problem? Yes   At  the time of discharge, did someone talk to you about what to do if you experienced worsening of your health problem? Yes   At the time of discharge, did someone talk to you about which medication to take when you left the hospital and which ones to stop taking? Yes   At the time of discharge, did someone talk to you about when and where to follow up with a doctor after you left the hospital? Yes   What do you believe caused you to be sick enough to be re-admitted?   (i had fluid on me)   How often do you need to have someone help you when you read instructions, pamphlets, or other written material from your doctor or pharmacy? Never   Do you have problems taking your medications as prescribed? Yes   Do you have any problems affording any of  your prescribed medications? No       Nakita Larose RN    [phone]

## 2020-03-12 NOTE — ASSESSMENT & PLAN NOTE
Severe mitral regurgitation  Essential hypertension  Acute pulmonary edema  oxygen sat was 86 % on admission, improved to 97% on 2 L of oxygen  BNP 2667,   Low normal left ventricular systolic function, EF 45%, severe 4+ MR with severely dilated LA. Grade III (severe) left ventricular diastolic dysfunction consistent with restrictive physiology  Severe mitral regurgitation  CXR with CHF with mild interstitial edema, similar but improved compared to the prior study.  Continue IV lasix,  Switched to PO at discharge  Appreciates cardiology- FU with [facility] with CTS as previously recommended in previous admissions

## 2020-03-12 NOTE — CONSULTS
"Food & Nutrition  Education    Diet Education: For Education on fluid and salt restriciton  Time Spent: 15 minutes  Learners: Patient       Nutrition Education provided with handouts: Fluid Restricted Diet, Heart Healthy Low Sodium Nutrition Therapy, Heart Healthy Label Reading Tips      Comments: Pt reports good appetite, eating 100% and denies N/V/D/C at this time. Reports he has been educated in the past. States he currently does not cook with salt. Pt in hurry to be discharged to go to his appointment "across the street." Discussed consuming a low Na diet and how to properly read nutrition labels with key terms and values to look for. 140 mg or less per serving of food and 500-600 mg or less per meal. Discussed what is considered a fluid, the importance of a fluid restriction and ways to combat thirst. Pt expressed understanding of all topics discussed at this time.      NFPE unable to be completed per pt denial    All questions and concerns answered. Dietitian's contact information provided.       Follow-Up: 1 x/week    Please Re-consult as needed        Thanks!      "

## 2020-03-12 NOTE — PROGRESS NOTES
Patient seen this AM for education and possible enrollment in tobacco trust. States he started smoking at age 25, and smokes 1 ppd. Patient states he knows he needs to quit smoking or he will keep getting sick. Spoke with nurse Galindo about ordering 21 mg nicotine patch for patient. Enrolled in tobacco treatment trust and referred to ambulatory smoking cessation clinic. Handout with number to [facility] given to patient.

## 2020-03-12 NOTE — PLAN OF CARE
03/12/20 1055   Final Note   Assessment Type Final Discharge Note   Anticipated Discharge Disposition Home   Hospital Follow Up  Appt(s) scheduled? Yes   Discharge plans and expectations educations in teach back method with documentation complete? Yes   Right Care Referral Info   Post Acute Recommendation No Care

## 2020-03-12 NOTE — PROGRESS NOTES
[facility] Hospital Medicine  Progress Note    Patient Name: Anirudh David  MRN: [id number]  Patient Class: OP- Observation   Admission Date: 3/11/2020  Length of Stay: 0 days  Attending Physician: Carol Wright*  Primary Care Provider: Ari No MD        Subjective:     Principal Problem:Congestive heart failure        HPI:  Frankie Oleary is a 58 y/o M with PMH of dermatitis, hypertension, smoker, recent cardiac catheterization (2/17) due to NSTEMI, multiple admission in the past month for pulm edema, bilateral pneumonia, present to [facility] with 2 weeks history of progressive SOB with associated nausea, vomiting, generalized body aches, and subjective fever.  In the ED his oxygen sat was 86 % improved to 97% on 2 L of oxygen, BNP 2667, CXR with CHF with mild interstitial edema, similar but improved compared to the prior study.    Overview/Hospital Course:  No notes on file    Interval History: awake and alert, on RA . Diuresing, requesting to be discharge to go for his back appointment at 1pm. Possible discharge pending cardiology rec's     Review of Systems   Constitutional: Negative for chills and fever.   Respiratory: Negative for apnea and shortness of breath.    Cardiovascular: Negative for chest pain and leg swelling.   Gastrointestinal: Negative for abdominal distention, abdominal pain, nausea and vomiting.   Genitourinary: Negative for dysuria and urgency.   Musculoskeletal: Negative for back pain.   Neurological: Negative for syncope and light-headedness.     Objective:     Vital Signs (Most Recent):  Temp: 97.9 °F (36.6 °C) (03/12/20 0817)  Pulse: 91 (03/12/20 0817)  Resp: 18 (03/12/20 0817)  BP: 130/70 (03/12/20 0817)  SpO2: 98 % (03/12/20 0817) Vital Signs (24h Range):  Temp:  [96.8 °F (36 °C)-98.1 °F (36.7 °C)] 97.9 °F (36.6 °C)  Pulse:  [] 91  Resp:  [12-25] 18  SpO2:  [86 %-98 %] 98 %  BP: (105-162)/() 130/70     Weight: 59.7 kg (131 lb  9.8 oz)  Body mass index is 16.9 kg/m².    Intake/Output Summary (Last 24 hours) at 3/12/2020 0941  Last data filed at 3/12/2020 0700  Gross per 24 hour   Intake 490 ml   Output 5250 ml   Net -4760 ml      Physical Exam   Constitutional: He is oriented to person, place, and time. He appears well-developed and well-nourished.   HENT:   Head: Normocephalic and atraumatic.   Eyes: EOM are normal.   Neck: Neck supple.   Cardiovascular: Normal rate, regular rhythm and normal heart sounds. Exam reveals no gallop and no friction rub.   No murmur heard.  Pulmonary/Chest: Effort normal. He has no wheezes. He exhibits no tenderness.   Abdominal: Soft. Bowel sounds are normal. He exhibits no distension. There is no tenderness.   Musculoskeletal: He exhibits no edema or tenderness.   Neurological: He is alert and oriented to person, place, and time.   Skin: Skin is warm. Capillary refill takes less than 2 seconds.   Psychiatric: He has a normal mood and affect.       Significant Labs:   BMP:   Recent Labs   Lab 03/11/20  1820  03/12/20  0411   GLU  --   --  129*   NA  --   --  140   K  --    < > 3.8   CL  --   --  101   CO2  --   --  28   BUN  --   --  26*   CREATININE  --   --  1.2   CALCIUM  --   --  8.6*   MG 2.0  --   --     < > = values in this interval not displayed.     CBC:   Recent Labs   Lab 03/11/20  1119   WBC 4.10   HGB 13.4*   HCT 41.6        Urine Culture: No results for input(s): LABURIN in the last 48 hours.  Urine Studies: No results for input(s): COLORU, APPEARANCEUA, PHUR, SPECGRAV, PROTEINUA, GLUCUA, KETONESU, BILIRUBINUA, OCCULTUA, NITRITE, UROBILINOGEN, LEUKOCYTESUR, RBCUA, WBCUA, BACTERIA, SQUAMEPITHEL, HYALINECASTS in the last 48 hours.    Invalid input(s): WRIGHTSUR    Significant Imaging: I have reviewed all pertinent imaging results/findings within the past 24 hours.      Assessment/Plan:      * Congestive heart failure  Severe mitral regurgitation  Essential hypertension  Acute pulmonary  edema  oxygen sat was 86 % on admission, improved to 97% on 2 L of oxygen  BNP 2667,   Low normal left ventricular systolic function, EF 45%, severe 4+ MR with severely dilated LA. Grade III (severe) left ventricular diastolic dysfunction consistent with restrictive physiology  Severe mitral regurgitation  CXR with CHF with mild interstitial edema, similar but improved compared to the prior study.  Continue IV lasix      Severe mitral regurgitation  Echo and card cath on 2/17/2020 reported   Low normal left ventricular systolic function, EF 45%, severe 4+ MR with severely dilated LA. Grade III (severe) left ventricular diastolic dysfunction consistent with restrictive physiology  Severe mitral regurgitation    Lumbar strain, initial encounter  He noted he has an appointment for his back at 1 pm on 3/12        VTE Risk Mitigation (From admission, onward)         Ordered     enoxaparin injection 40 mg  Daily      03/11/20 1646     IP VTE HIGH RISK PATIENT  Once      03/11/20 1646                      Carol Wright MD  Department of Hospital Medicine   [facility]

## 2020-03-12 NOTE — PLAN OF CARE
Pt having leg cramping since the latest dose of lasix.  Notified FREEMAN Dejesus  Awaiting orders.

## 2020-03-26 NOTE — Clinical Note
Your patient just started with [facility]. FTND of 1 indicates a low level of tobacco dependency. CESD of 0 is preceived as no mental distress or depression at this time. Pt quit smoking 20 cigarettes per day, 2 days ago. Patient using 21 mg patches.

## 2020-04-09 NOTE — Clinical Note
Just a note to advise how the patient is progressing in the tobacco cessation program. Patient states he's tobacco free since 3/24/2020. Commended patient on success. The patient remains on the prescribed tobacco cessation medication regimen of 21 mg Patches without any negative side effects at this time. He states he's doing great with his smoking quit. Pt complains of SOB and states he's not heard back from his covid19 swab done on 3/11/2020 in the ED. Advised to follow up with his PCP Dr. Ari No. Pt. Pt expressed understanding and was going to call as soon as we ended our phone conversation. The patient denies any abnormal behavioral or mental changes at this time. The patient will continue with group therapy sessions and medication monitoring by CTTS. Prescribed medication management will be by physician.

## 2020-04-09 NOTE — PROGRESS NOTES
Individual Follow-Up Form    4/9/2020    Quit Date: 3/24/2020    Clinical Status of Patient: Outpatient    Length of Service: 30 minutes    Continuing Medication: yes  Patches    Other Medications:      Target Symptoms: Withdrawal and medication side effects. The following were  rated moderate (3) to severe (4) on TCRS:  · Moderate (3): None  · Severe (4): None    Comments: Patient states he's tobacco free since 3/24/2020. Commended patient on success. The patient remains on the prescribed tobacco cessation medication regimen of 21 mg Patches without any negative side effects at this time. He states he's doing great with his smoking quit. Pt complains of SOB and states he's not heard back from his covid19 swab done on 3/11/2020 in the ED. Advised to follow up with his PCP Dr. Ari No. Pt. Pt expressed understanding and was going to call as soon as we ended our phone conversation. The patient denies any abnormal behavioral or mental changes at this time. The patient will continue with group therapy sessions and medication monitoring by CTTS. Prescribed medication management will be by physician.     Diagnosis: F17.210    Next Visit: 2 weeks

## 2020-04-09 NOTE — ED PROVIDER NOTES
Encounter Date: 2020       History     Chief Complaint   Patient presents with    Shortness of Breath     reports intermittent SOB, chest pain, cough, chills, nausea, diarrhea. reports last month was hospitalized with pneumonia.     Patient is a 59-year-old male who complains of shortness of breath.  He denies cough.  He has had subjective fever at home.  Patient has also had occasional diarrhea.  He says he was recently admitted in this facility for pneumonia.  He denies any change in smell or taste.        Review of patient's allergies indicates:  No Known Allergies  Past Medical History:   Diagnosis Date    Dermatitis     Hypertension      Past Surgical History:   Procedure Laterality Date    CORONARY ANGIOGRAPHY N/A 2020    Procedure: ANGIOGRAM, CORONARY ARTERY;  Surgeon: Tomy Patterson MD;  Location: [facility] CATH LAB/EP;  Service: Cardiology;  Laterality: N/A;    HAND SURGERY      LEFT HEART CATHETERIZATION N/A 2020    Procedure: Left heart cath;  Surgeon: Tomy Patterson MD;  Location: [facility] CATH LAB/EP;  Service: Cardiology;  Laterality: N/A;    MANDIBLE FRACTURE SURGERY       History reviewed. No pertinent family history.  Social History     Tobacco Use    Smoking status: Former Smoker     Packs/day: 1.00     Years: 34.00     Pack years: 34.00     Types: Cigarettes     Last attempt to quit: 3/24/2020     Years since quittin.0    Smokeless tobacco: Never Used    Tobacco comment: Patient started smoking at age 25 and states he is ready to quit. Enrolled in tobacco trust and referred to ambulatory smoking cessation clinic.   Substance Use Topics    Alcohol use: Yes     Alcohol/week: 6.0 standard drinks     Types: 6 Cans of beer per week     Comment: occ    Drug use: No     Review of Systems   Constitutional:        Subjective fever.   Respiratory: Positive for shortness of breath. Negative for cough.    Cardiovascular: Negative for chest pain.   Gastrointestinal: Positive for  diarrhea and nausea. Negative for abdominal pain and vomiting.   Neurological: Negative for light-headedness.   All other systems reviewed and are negative.      Physical Exam     Initial Vitals   BP Pulse Resp Temp SpO2   04/09/20 1754 04/09/20 1749 04/09/20 1749 04/09/20 1749 04/09/20 1749   125/88 (!) 114 (!) 24 98 °F (36.7 °C) 99 %      MAP       --                Physical Exam    Nursing note and vitals reviewed.  Constitutional: No distress.   HENT:   Head: Atraumatic.   Eyes: EOM are normal.   Neck: Normal range of motion. Neck supple.   Cardiovascular:   Tachycardic.   Pulmonary/Chest:   Fine crackles at bases bilaterally.   Abdominal: Soft. There is no tenderness.   Musculoskeletal: Normal range of motion. He exhibits no edema.   Neurological: He is alert and oriented to person, place, and time.   Skin: Skin is warm and dry.   Psychiatric: Thought content normal.         ED Course   Procedures  Labs Reviewed   CBC W/ AUTO DIFFERENTIAL - Abnormal; Notable for the following components:       Result Value    RBC 4.33 (*)     Hemoglobin 12.6 (*)     Hematocrit 38.6 (*)     All other components within normal limits   COMPREHENSIVE METABOLIC PANEL - Abnormal; Notable for the following components:    CO2 22 (*)     Calcium 8.4 (*)     Albumin 3.4 (*)     Total Bilirubin 1.4 (*)     AST 80 (*)     ALT 89 (*)     All other components within normal limits   LACTATE DEHYDROGENASE - Abnormal; Notable for the following components:     (*)     All other components within normal limits   CK - Abnormal; Notable for the following components:     (*)     All other components within normal limits   TROPONIN I - Abnormal; Notable for the following components:    Troponin I 0.050 (*)     All other components within normal limits   B-TYPE NATRIURETIC PEPTIDE - Abnormal; Notable for the following components:    BNP 2,515 (*)     All other components within normal limits   C-REACTIVE PROTEIN   FERRITIN   LACTIC ACID,  PLASMA   SARS-COV-2 RNA AMPLIFICATION, QUAL    Narrative:     What symptom criteria does the patient meet?->Difficulty  breathing  What symptom criteria does the patient meet?->Cough   PROCALCITONIN          Imaging Results          X-Ray Chest AP Portable (Final result)  Result time 04/09/20 18:49:08    Final result by Kristel Welch MD (04/09/20 18:49:08)                 Impression:      Cardiomegaly with mild pulmonary edema versus infectious process.  No focal consolidation seen.      Electronically signed by: Kristel Welch MD  Date:    04/09/2020  Time:    18:49             Narrative:    EXAMINATION:  XR CHEST AP PORTABLE    CLINICAL HISTORY:  Suspected Covid-19 Virus Infection;    TECHNIQUE:  Single frontal view of the chest was performed.    COMPARISON:  03/11/2020.    FINDINGS:  Cardiac silhouette is enlarged but stable in size.  Lungs are symmetrically expanded.  Mild perihilar opacity is seen with mild asymmetric increased ground-glass attenuation and haziness within the right mid lung zone.  Findings could reflect infectious process versus mild pulmonary edema.  No evidence of focal consolidation.  No pneumothorax or large pleural effusion seen.  No acute osseous abnormality identified.                                 Medical Decision Making:   Clinical Tests:   Lab Tests: Ordered and Reviewed  Radiological Study: Reviewed and Ordered  ED Management:  59-year-old male presenting with shortness of breath.  Patient was recently admitted here for CHF.  His continued with shortness of breath occasional cough and is concerned he may have corona virus.  Rapid COVID-19 swab here is negative.  Chest x-ray shows cardiomegaly and pulmonary edema.  Patient was given 100 mg of Lasix intravenously here in the ED. His GFR is greater than 60.  Oxygen saturations ranged from 96-99% on room air.  I feel the patient may be safely discharged to home to continue his current medication regimen and follow up with his  primary physician as soon as able.  He will most importantly return to the ED for any possible worsening.                                 Clinical Impression:       ICD-10-CM ICD-9-CM   1. Congestive heart failure, unspecified HF chronicity, unspecified heart failure type I50.9 428.0   2. Suspected Covid-19 Virus Infection R68.89                                 Sin Plascencia MD  04/12/20 0619

## 2020-04-09 NOTE — ED NOTES
"Pt is alert and oriented. C/O sob x a month and a half that is worse when he lies flat. Pt ambulates to waiting room without distress, speaking in full sentences. Pt states he was treated for pneumonia last month. Pt states he had chills last night. Pt states positive nausea, no vomiting, and diarrhea "every now and then". Pt states he has an infrequent cough with yellow mucus.   "

## 2020-04-09 NOTE — ED NOTES
APPEARANCE: Alert, oriented and in no acute distress.  CARDIAC: Tachycardic rate and rhythm, no murmur heard.   PERIPHERAL VASCULAR: peripheral pulses present. Normal cap refill. No edema. Warm to touch.    RESPIRATORY:Normal rate and effort, breath sounds diminished bilaterally throughout chest. Respirations are equal and unlabored no obvious signs of distress.  GASTRO: soft, bowel sounds normal, no tenderness, no abdominal distention.  MUSC: Full ROM. No bony tenderness or soft tissue tenderness. No obvious deformity.  SKIN: Skin is warm and dry, normal skin turgor, mucous membranes moist.  NEURO: 5/5 strength major flexors/extensors bilaterally. Sensory intact to light touch bilaterally. [place] coma scale: eyes open spontaneously-4, oriented & converses-5, obeys commands-6. No neurological abnormalities.   MENTAL STATUS: awake, alert and aware of environment.  EYE: PERRL, both eyes: pupils brisk and reactive to light. Normal size.  ENT: EARS: no obvious drainage. NOSE: no active bleeding.   Pt reports he has symptoms of COVID-19- he was admitted for pneumonia last month.

## 2020-04-10 NOTE — ED NOTES
Pt reports feeling better since arrival to hospital. Pt vital signs stable. Will continue to monitor. Call light within reach. Pt instructed to call for assistance

## 2020-04-15 NOTE — ED NOTES
Report received. Care assumed. Pt sitting upright on stretcher reports that he isn't able to lay down because he becomes SOB. Denies SOB at this time. Denies pain

## 2020-04-15 NOTE — ED PROVIDER NOTES
"Encounter Date: 4/14/2020       History     Chief Complaint   Patient presents with    Shortness of Breath     pt states symptoms have not gotten better from his visit the other day, he can't sleep due to not being able to breathe    Cough     Anirudh David is a 59 y.o. male who  has a past medical history of Dermatitis and Hypertension.    The patient presents to the ED due to shortness of breath.   Patient reports symptoms have been present for months, stating "it's the same thing as last time."  He states his symptoms are worse at night, and states he was unable to sleep at all last night due to SOB.   He has never been evaluated for a sleep study, but states "that's what I need." He was scheduled to see his PCP earlier today, but missed his appointment because he was so tired from not sleeping last night that he slept through his appointment today.  He reports history of tobacco abuse, but states he quit 1 week ago. He is also concerned he has COPD and is requesting a "pump."  He reports fever but states "I didn't have anything to check my temperature with." He denies any cough, chest pain, abdominal pain, N/V/D, leg pain/swelling, or any other complaints.           Review of patient's allergies indicates:  No Known Allergies  Past Medical History:   Diagnosis Date    Dermatitis     Hypertension      Past Surgical History:   Procedure Laterality Date    CORONARY ANGIOGRAPHY N/A 2/17/2020    Procedure: ANGIOGRAM, CORONARY ARTERY;  Surgeon: Tomy Patterson MD;  Location: [facility] CATH LAB/EP;  Service: Cardiology;  Laterality: N/A;    HAND SURGERY      LEFT HEART CATHETERIZATION N/A 2/17/2020    Procedure: Left heart cath;  Surgeon: Tomy Patterson MD;  Location: [facility] CATH LAB/EP;  Service: Cardiology;  Laterality: N/A;    MANDIBLE FRACTURE SURGERY       History reviewed. No pertinent family history.  Social History     Tobacco Use    Smoking status: Former Smoker     Packs/day: 1.00     Years: 34.00    "  Pack years: 34.00     Types: Cigarettes     Last attempt to quit: 3/24/2020     Years since quittin.0    Smokeless tobacco: Never Used    Tobacco comment: Patient started smoking at age 25 and states he is ready to quit. Enrolled in tobacco trust and referred to ambulatory smoking cessation clinic.   Substance Use Topics    Alcohol use: Yes     Alcohol/week: 6.0 standard drinks     Types: 6 Cans of beer per week     Comment: occ    Drug use: No     Review of Systems   Constitutional: Positive for fever (subjective). Negative for activity change, appetite change and chills.   HENT: Negative for congestion and sore throat.    Respiratory: Positive for shortness of breath.    Cardiovascular: Negative for chest pain, palpitations and leg swelling.   Gastrointestinal: Negative for constipation, diarrhea, nausea and vomiting.   Genitourinary: Negative for dysuria, frequency and urgency.   Musculoskeletal: Negative for back pain.   Skin: Negative for rash and wound.   Neurological: Negative for weakness.   Hematological: Does not bruise/bleed easily.   Psychiatric/Behavioral: Negative for agitation, behavioral problems and confusion.       Physical Exam     Initial Vitals [20]   BP Pulse Resp Temp SpO2   (!) 149/95 60 20 98.2 °F (36.8 °C) 100 %      MAP       --         Physical Exam    Nursing note and vitals reviewed.  Constitutional: He appears well-developed and well-nourished. He is not diaphoretic. No distress.   Well-appearing, NAD, ambulatory without issue.   HENT:   Head: Normocephalic and atraumatic.   Mouth/Throat: Oropharynx is clear and moist.   Eyes: EOM are normal. Pupils are equal, round, and reactive to light.   Neck: No tracheal deviation present.   Cardiovascular: Normal rate, regular rhythm, normal heart sounds and intact distal pulses.   Pulmonary/Chest: Effort normal. No stridor. No tachypnea and no bradypnea. No respiratory distress. He has decreased breath sounds in the right  lower field. He has no wheezes. He has no rhonchi. He has no rales.   Mildly diminished breath sounds at R lung base.  No wheezing or rales.   Abdominal: Soft. He exhibits no distension and no mass. There is no tenderness.   Musculoskeletal: Normal range of motion. He exhibits no edema.   Neurological: He is alert and oriented to person, place, and time. No cranial nerve deficit or sensory deficit.   Skin: Skin is warm and dry. Capillary refill takes less than 2 seconds. No rash noted.   Psychiatric: He has a normal mood and affect. His behavior is normal. Thought content normal.         ED Course   Procedures  Labs Reviewed   CBC WITHOUT DIFFERENTIAL - Abnormal; Notable for the following components:       Result Value    Hemoglobin 13.8 (*)     All other components within normal limits   COMPREHENSIVE METABOLIC PANEL - Abnormal; Notable for the following components:    CO2 21 (*)     Total Bilirubin 2.1 (*)      (*)      (*)     eGFR if non  55 (*)     All other components within normal limits   B-TYPE NATRIURETIC PEPTIDE - Abnormal; Notable for the following components:    BNP 2,540 (*)     All other components within normal limits   TROPONIN I - Abnormal; Notable for the following components:    Troponin I 0.094 (*)     All other components within normal limits   URINALYSIS, REFLEX TO URINE CULTURE - Abnormal; Notable for the following components:    Protein, UA 1+ (*)     All other components within normal limits    Narrative:     Preferred Collection Type->Urine, Clean Catch   URINALYSIS MICROSCOPIC - Abnormal; Notable for the following components:    Hyaline Casts, UA 2 (*)     Granular Casts, UA 2 (*)     All other components within normal limits    Narrative:     Preferred Collection Type->Urine, Clean Catch   TROPONIN I - Abnormal; Notable for the following components:    Troponin I 0.080 (*)     All other components within normal limits     EKG Readings: (Independently  Interpreted)   Initial Reading: No STEMI. Previous EKG: Compared with most recent EKG Rhythm: Normal Sinus Rhythm.   Normal sinus rhythm, rate 96, incomplete right bundle branch block, nonspecific ST changes lateral leads, no ST elevations or other signs of ischemia, normal intervals.  Grossly stable from prior EKG March 2020.       Imaging Results          X-Ray Chest AP Portable (Final result)  Result time 04/14/20 20:46:25    Final result by Renan Macedo MD (04/14/20 20:46:25)                 Impression:      1. Findings suggesting early congestive change noting shallow inspiratory effort.  No convincing large focal consolidation.      Electronically signed by: Renan Macedo MD  Date:    04/14/2020  Time:    20:46             Narrative:    EXAMINATION:  XR CHEST AP PORTABLE    CLINICAL HISTORY:  CHF, shortness of breath;    TECHNIQUE:  Single frontal view of the chest was performed.    COMPARISON:  04/09/2012    FINDINGS:  The cardiomediastinal silhouette is enlarged, stable..  There is no pleural effusion.  The trachea is midline.  The lungs are symmetrically expanded bilaterally with mildly prominent interstitial attenuation.  There is left basilar subsegmental atelectasis..  No large focal consolidation seen.  There is no pneumothorax.  The osseous structures are remarkable for degenerative change..                                 Medical Decision Making:   History:   Old Medical Records: I decided to obtain old medical records.  Old Records Summarized: other records.       <> Summary of Records: Patient has extensive history of CHF and multiple recent ED evaluations.  2/17-2/19: admitted to [facility] due to SOB. Troponin elevated 0.2. LHC performed with normal coronary arteries.  2/20-2/21: admitted to [facility] due to bilateral pneumonia, treated with levaquin, diuresed with Lasix.  3/2: admitted to [facility]- due to pneumonia.  3/11-3/12: admitted to [facility] due to CHF exacerbation.   4/9: seen in ED due to SOB.  COVID negative. Treated with Lasix with improvement. No hypoxia. D/C home with PCP f/u.  Initial Assessment:   60 yo M with CHF, HTN presents to ED with SOB for the last several days. Multiple evaluations recently for SOB.  Vitals reassuring, exam benign.  Will obtain cardiac evaluation, labs, CXR, and continue to monitor.  Differential Diagnosis:   Differential Diagnosis includes, but is not limited to:  PE, MI/ACS, pneumothorax, pericardial effusion/tamonade, pneumonia, lung abscess, pericarditis/myocarditis, pleural effusion, lung mass, CHF exacerbation, asthma exacerbation, COPD exacerbation, aspirated/ingested foreign body, airway obstruction, CO poisoning, anemia, metabolic derangement, allergy/atopy, influenza, viral URI, viral syndrome.    Clinical Tests:   Lab Tests: Ordered and Reviewed  Radiological Study: Reviewed and Ordered  Medical Tests: Ordered and Reviewed  ED Management:  EKG without ischemia or arrhythmia.  CXR consistent with mild CHF, no significant infiltrate.  Labs with elevated BNP and troponin, which appear stable from prior. Delta troponin stable.  Given Lasix in ED. Vitals remained stable, no hypoxia, tachypnea, or respiratory distress. Appears well-compensated on today's visit. No indication for admission at this time.  Patient may benefit from sleep study as symptoms appear worse at night. Recommend close f/u with PCP for further evaluation.      On re-evaluation, the patient's status has improved.  After complete ED evaluation, clinical impression is most consistent with shortness of breath.  PCP follow-up within 2-3 days was recommended.    After taking into careful account the patient's history, physical exam findings, as well as empirical and objective data obtained throughout ED workup, I feel no emergent medical condition has been identified. No further evaluation or admission was felt to be required, and the patient is stable for discharge from the ED. The patient and any  additional family present were updated with test results, overall clinical impression, and recommended further plan of care, including discharge instructions as provided and outpatient follow-up for continued evaluation and management as needed. All questions were answered. The patient expressed understanding and agreed with current plan for discharge and follow-up plan of care. Strict ED return precautions were provided, including return/worsening of current symptoms, new symptoms, or any other concerns.                                   Clinical Impression:       ICD-10-CM ICD-9-CM   1. Chronic congestive heart failure, unspecified heart failure type I50.9 428.0   2. Shortness of breath R06.02 786.05             ED Disposition Condition    Discharge Stable        ED Prescriptions     Medication Sig Dispense Start Date End Date Auth. Provider    albuterol (PROVENTIL/VENTOLIN HFA) 90 mcg/actuation inhaler Inhale 2 puffs into the lungs every 4 (four) hours as needed for Wheezing or Shortness of Breath (cough). Rescue 6.7 g 4/15/2020 4/22/2020 Jarod Gomez MD        Follow-up Information     Follow up With Specialties Details Why Contact Info    La Nena Carrera Mai, MD Family Medicine Schedule an appointment as soon as possible for a visit   [address]  [place] [address] [facility]  [place] [address]  [phone]                                 Jarod Gomez MD  04/16/20 4147

## 2020-04-21 PROBLEM — J96.01 ACUTE HYPOXEMIC RESPIRATORY FAILURE: Status: ACTIVE | Noted: 2020-01-01

## 2020-04-21 PROBLEM — I50.9 ACUTE ON CHRONIC HEART FAILURE: Status: ACTIVE | Noted: 2020-01-01

## 2020-04-21 PROBLEM — R91.1 PULMONARY NODULE: Status: ACTIVE | Noted: 2020-01-01

## 2020-04-21 NOTE — NURSING
Contact, droplet, airborne precautions sign placed on door. [place] with proper PPE hung on door for staff.

## 2020-04-21 NOTE — PLAN OF CARE
VN cued into patients room for q2h rounding.  Patient sitting up in bed eating breakfast, no acute distress noted.  Pt on RA; denies any SOB/difficulty breathing.  Cont pulse ox: not in place.   RR 19    Denies any needs at this time. Informed to call for assistance if any distress/SOB.  Call bell within reach. Will continue to monitor.

## 2020-04-21 NOTE — NURSING
Patient safety maintained. Medications administered per order. Assistance provided as needed. Printed documentation provided for discharge. IV and telemetry removed, patient tolerated well.

## 2020-04-21 NOTE — ED PROVIDER NOTES
"Encounter Date: 4/20/2020       History     Chief Complaint   Patient presents with    Diarrhea     pt states has been seen multiple times over the past month, most recently last week. pt states since last week has now been having diarrhea, "breathing", and "fluid". Able to obtain that pt is having swelling to "feet, legs, face, all over". Pt also reports cough. Unable to obtain more hx due to pt stating "just look at the paper" and "you should know me". Pt poor historian.      59-year-old male with history hypertension, hepatitis-C and probably CHF although the patient is a poor historian and unable to tell me much about his medical history.  He states that he has chronic lower extremity edema bilaterally in reports that is worse recently.  He is unable to give me a time frame.  He also states that he has diffuse body aches as well as chest pain and shortness of breath.  He has a chronic cough.  He states that is worse recently.  He is a smoker and likely has COPD as well.  Patient has poor compliance and follow-up.  He has been to the ER multiple times over the past several weeks.  Patient also states that he has been having nausea vomiting and diarrhea.  No blood.  This is been going on for a few days as well.  Patient unable to tell me more about this.  He tells me to look it up in the computer        Review of patient's allergies indicates:  No Known Allergies  Past Medical History:   Diagnosis Date    Dermatitis     Hypertension      Past Surgical History:   Procedure Laterality Date    CORONARY ANGIOGRAPHY N/A 2/17/2020    Procedure: ANGIOGRAM, CORONARY ARTERY;  Surgeon: Tomy Patterson MD;  Location: [facility] CATH LAB/EP;  Service: Cardiology;  Laterality: N/A;    HAND SURGERY      LEFT HEART CATHETERIZATION N/A 2/17/2020    Procedure: Left heart cath;  Surgeon: Tomy Patterson MD;  Location: [facility] CATH LAB/EP;  Service: Cardiology;  Laterality: N/A;    MANDIBLE FRACTURE SURGERY       No family " history on file.  Social History     Tobacco Use    Smoking status: Former Smoker     Packs/day: 1.00     Years: 34.00     Pack years: 34.00     Types: Cigarettes     Last attempt to quit: 3/24/2020     Years since quittin.0    Smokeless tobacco: Never Used    Tobacco comment: Patient started smoking at age 25 and states he is ready to quit. Enrolled in tobacco trust and referred to ambulatory smoking cessation clinic.   Substance Use Topics    Alcohol use: Yes     Alcohol/week: 6.0 standard drinks     Types: 6 Cans of beer per week     Comment: occ    Drug use: No     Review of Systems   Reason unable to perform ROS: Review of systems limited as the patient answers in the affirmative for every question and.   Constitutional: Positive for activity change, fatigue and fever.   Respiratory: Positive for cough and shortness of breath.    Gastrointestinal: Positive for diarrhea, nausea and vomiting.   Musculoskeletal: Positive for myalgias.       Physical Exam     Initial Vitals [20]   BP Pulse Resp Temp SpO2   (!) 115/92 99 18 97.9 °F (36.6 °C) 100 %      MAP       --         Physical Exam    Nursing note and vitals reviewed.  Constitutional: He appears well-developed and well-nourished. No distress.   HENT:   Head: Normocephalic and atraumatic.   Mouth/Throat: Oropharynx is clear and moist.   Eyes: Conjunctivae and EOM are normal. Pupils are equal, round, and reactive to light.   Neck: Normal range of motion. Neck supple. JVD present.   Cardiovascular: Normal rate and regular rhythm.   Pulmonary/Chest: No stridor.   No signs of respiratory distress or accessory muscle use   Abdominal: Soft. He exhibits no distension. There is no tenderness.   Musculoskeletal: Normal range of motion. He exhibits edema.   Symmetric 1+ pitting edema in the lower extremities   Neurological: He is alert and oriented to person, place, and time. He has normal strength.   CN 2-12 appear grossly intact   Skin: Skin is warm  and dry.   Psychiatric: He has a normal mood and affect.         ED Course   Procedures  Labs Reviewed   CBC W/ AUTO DIFFERENTIAL   COMPREHENSIVE METABOLIC PANEL   TROPONIN I   B-TYPE NATRIURETIC PEPTIDE   LIPASE     EKG Readings: (Independently Interpreted)   Initial Reading: No STEMI. Previous EKG: Compared with most recent EKG Previous EKG Date: 4/14. Rhythm: Normal Sinus Rhythm. Heart Rate: 95. Ectopy: No Ectopy. Conduction: Normal. ST Segments: Normal ST Segments. T Waves: Normal. Axis: Right Axis Deviation. Other Impression: Normal sinus rhythm with right axis pulmonary disease pattern with incomplete right bundle consistent with prior study       Imaging Results          X-Ray Chest AP Portable (Final result)  Result time 04/20/20 21:15:30    Final result by Delma Cornelius MD (04/20/20 21:15:30)                 Impression:      Enlargement the cardiac silhouette.  The possibility of underlying pericardial effusion cannot entirely be excluded.    Increase in right basilar atelectasis.      Electronically signed by: Delma Cornelius  Date:    04/20/2020  Time:    21:15             Narrative:    EXAMINATION:  AP PORTABLE CHEST    CLINICAL HISTORY:  Shortness of breath    TECHNIQUE:  AP portable chest radiograph was submitted.    COMPARISON:  04/14/2020    FINDINGS:  AP portable chest radiograph demonstrates enlargement of the cardiac silhouette.  There is mild increase in right basilar density with corresponding mild elevation of the right hemidiaphragm.  Findings favor right basilar atelectatic change.  There is questionable mild underlying interstitial prominence.  The possibility of underlying pulmonary vascular congestion may be considered.  There is no pneumothorax or significant pleural effusion.                              X-Rays:   Independently Interpreted Readings:   Other Readings:  Imaging Results          X-Ray Chest AP Portable (Final result)  Result time 04/20/20 21:15:30    Final result by  Delma Cornelius MD (04/20/20 21:15:30)                 Impression:      Enlargement the cardiac silhouette.  The possibility of underlying   pericardial effusion cannot entirely be excluded.    Increase in right basilar atelectasis.      Electronically signed by: Delma Cornelius  Date:    04/20/2020  Time:    21:15             Narrative:    EXAMINATION:  AP PORTABLE CHEST    CLINICAL HISTORY:  Shortness of breath    TECHNIQUE:  AP portable chest radiograph was submitted.    COMPARISON:  04/14/2020    FINDINGS:  AP portable chest radiograph demonstrates enlargement of the cardiac   silhouette.  There is mild increase in right basilar density with   corresponding mild elevation of the right hemidiaphragm.  Findings favor   right basilar atelectatic change.  There is questionable mild underlying   interstitial prominence.  The possibility of underlying pulmonary vascular   congestion may be considered.  There is no pneumothorax or significant   pleural effusion.                              Medical Decision Making:   Initial Assessment:   Comfortable nontoxic appearing patient.  Review of his records show recent echo with results as follows:  · Low normal left ventricular systolic function. The estimated ejection fraction is 45-50%.  · Mild left ventricular enlargement.  · Eccentric left ventricular hypertrophy.  · Grade III (severe) left ventricular diastolic dysfunction consistent with restrictive physiology.  · No wall motion abnormalities.  · Normal right ventricular systolic function.  · Pulmonary hypertension present.  · The estimated PA systolic pressure is 50 mmHg.  · Intermediate central venous pressure (8 mmHg).  · Severe left atrial enlargement.  · Moderate right atrial enlargement.  · Moderate mitral sclerosis: myxomatous  · Severe mitral regurgitation. Consider P2 myxomatous mitral valve prolapse. Would consider JOSE A and further valve disease workup  Differential Diagnosis:   Pulmonary hypertension,  pneumonia, pleural effusions, CHF, pulmonary edema, corona virus, ACS, P, other viral syndrome, COPD  Clinical Tests:   Lab Tests: Ordered and Reviewed  Radiological Study: Ordered and Reviewed  ED Management:  Patient's labs suggest baseline cardiac strain with mildly elevated troponin and largely elevated BNP.  Consistent with prior however.  Patient intermittently borderline hypoxic to the low 90s.  Imaging suggests possible parapneumonic pleural effusion versus PE.  Antibiotics ordered.  Aspirin and Lovenox ordered.  Patient most likely diagnosis is CHF exacerbation.  Lasix has been ordered.  Discussed with [facility] Medicine for admission                   ED Course as of Apr 20 2130 Mon Apr 20, 2020 2033 Sort note: Anirudh David nontoxic/afebrile 59 y.o.  presented to the ED with c/o leg swelling, increased SOB when laying flat.  Was seen on 3/14 and diagnosed with CHF exacerbation.       Patient seen and medically screened by Physician assistant in Sort process due to ED crowding.  Appropriate tests and/or medications ordered.  Care transferred to an alternate provider when patient was placed in an Exam Room from the [facility] for physical exam, additional orders, and disposition. [facility]      [AM]      ED Course User Index  [AM] Megan Stewart PA-C                Clinical Impression:       ICD-10-CM ICD-9-CM   1. Pneumonia of right lower lobe due to infectious organism J18.1 486   2. Shortness of breath R06.02 786.05   3. Pleural effusion J90 511.9   4. Pericardial effusion I31.3 423.9                                Tree Leonard, DO  04/21/20 0114       Tree Leonard, DO  04/21/20 0202

## 2020-04-21 NOTE — H&P
[facility] Medicine H&P Note     Admitting Team: [facility] Hospitalist Team A  Attending Physician: Dr. Aviles  Resident: Néstor  Intern: Alden    Date of Admit: 4/20/2020    Chief Complaint     Diarrhea, vomitting, LE swelling, SOB x 3 days    Subjective:      History of Present Illness:  Anirudh David is a 59 y.o.  male w/ a PMH of HFrEF (45%), untreated Hep C, reported COPD who presented to [facility] on 4/20/2020 with a primary complaint of Diarrhea, vomitting, LE swelling, SOB x 3 days    The patient was in their usual state of health (lives with sister, able to perform all ADLs) until 3 days prior to admission when pt endorses onset of N/V/D.  Patient states that he thinks he might have had a fever, but he is unsure.  Around the same time, pt endorses gradual onset of SOB that feels like he is suffocating when he lies flat.  He states that he normally sleeps on one pillow, but needed to sleep elevated on couch over this time.  He also noticed increased bilateral LE edema, which he states has never happened before.  He denies any sudden onset chest pain or sudden onset SOB.  Patient endorses productive cough of clear mucus over this time.  Patient states that the only medication he currently takes is his albuterol inhaler for his COPD.    Pt is a poor historian and reports different answers to multiple health care providers.  Per chart review, pt has had numerous visits for SOB and has poor follow up with multiple missed appointments to establish care with PCP.    Past Medical History:  Past Medical History:   Diagnosis Date    Dermatitis     Hypertension        Past Surgical History:  Past Surgical History:   Procedure Laterality Date    CORONARY ANGIOGRAPHY N/A 2/17/2020    Procedure: ANGIOGRAM, CORONARY ARTERY;  Surgeon: Tomy Patterson MD;  Location: [facility] CATH LAB/EP;  Service: Cardiology;  Laterality: N/A;    HAND SURGERY      LEFT HEART CATHETERIZATION N/A 2/17/2020    Procedure:  Left heart cath;  Surgeon: Tomy Patterson MD;  Location: [facility] CATH LAB/EP;  Service: Cardiology;  Laterality: N/A;    MANDIBLE FRACTURE SURGERY         Allergies:  Review of patient's allergies indicates:  No Known Allergies    Home Medications:  Prior to Admission medications    Medication Sig Start Date End Date Taking? Authorizing Provider   albuterol (PROVENTIL/VENTOLIN HFA) 90 mcg/actuation inhaler Inhale 2 puffs into the lungs every 4 (four) hours as needed for Wheezing or Shortness of Breath (cough). Rescue 4/15/20 4/22/20  Jarod Gomez MD   amlodipine (NORVASC) 5 MG tablet Take 1 tablet (5 mg total) by mouth once daily. 7/23/17 3/11/20  Sin Plascencia MD   cetirizine (ZYRTEC) 10 MG tablet Take 1 tablet (10 mg total) by mouth once daily. 2/21/20 2/20/21  Carol Wright MD   furosemide (LASIX) 20 MG tablet Take 1 tablet (20 mg total) by mouth 2 (two) times daily. 3/12/20 3/12/21  Carol Wright MD   HYDROcodone-acetaminophen (NORCO) 5-325 mg per tablet Take 1 tablet by mouth every 4 (four) hours as needed for Pain.  Patient not taking: Reported on 2/26/2020 1/20/20   ADRIANA Valdez   ibuprofen (ADVIL,MOTRIN) 600 MG tablet Take 1 tablet (600 mg total) by mouth every 6 (six) hours as needed for Pain.  Patient not taking: Reported on 2/26/2020 1/20/20   ADRIANA Valdez   nicotine (NICODERM CQ) 14 mg/24 hr Place 1 patch onto the skin once daily. 3/26/20   Edy House MD   nicotine (NICODERM CQ) 21 mg/24 hr Place 1 patch onto the skin once daily. 3/12/20   Carol Wright MD   polyethylene glycol (GLYCOLAX) 17 gram/dose powder mix and take 17 g ( 1 capful )  by mouth once daily as directed  Patient not taking: Reported on 2/26/2020 2/21/20   Carol Wright MD       Family History:  No family history on file.    Social History:  Social History     Tobacco Use    Smoking status: Former Smoker     Packs/day: 1.00     Years: 34.00     Pack  "years: 34.00     Types: Cigarettes     Last attempt to quit: 3/24/2020     Years since quittin.0    Smokeless tobacco: Never Used    Tobacco comment: Patient started smoking at age 25 and states he is ready to quit. Enrolled in tobacco trust and referred to ambulatory smoking cessation clinic.   Substance Use Topics    Alcohol use: Yes     Alcohol/week: 6.0 standard drinks     Types: 6 Cans of beer per week     Comment: occ    Drug use: No       Review of Systems:  All other systems are reviewed and are negative.    Health Maintaince :   Primary Care Physician: viktoriya, scheduled with family medicine Ari No MD on 20          Objective:   Last 24 Hour Vital Signs:  BP  Min: 115/92  Max: 133/101  Temp  Av °F (36.7 °C)  Min: 97.9 °F (36.6 °C)  Max: 98 °F (36.7 °C)  Pulse  Av.9  Min: 92  Max: 99  Resp  Av  Min: 14  Max: 30  SpO2  Av.2 %  Min: 93 %  Max: 100 %  Height  Av' 2" (188 cm)  Min: 6' 2" (188 cm)  Max: 6' 2" (188 cm)  Weight  Av.6 kg (160 lb)  Min: 72.6 kg (160 lb)  Max: 72.6 kg (160 lb)  Body mass index is 20.54 kg/m².  No intake/output data recorded.    Physical Examination:  General: sitting up in bed on 1 L NC with mildly dyspneic speech  HEENT: normal oral mucosa, conjunctiva normal, EOMI  Neck: no thyromegaly, + JVD  Cardiac: RRR, blowing holosystolic murmur best heard at 5th ICS midclavicular line  Pulmonary/Chest: mildlly WOB, CTAB, + rales, no rhonchi/wheezes  GI:  no masses, non tender, normal bowel sounds  Msk: 5/5 strength in bilateral LE and UEs, normal muscle tone  Vascular: normal peripheral perfusion   Lymph nodes: no lymphadenopathy appreciated  Skin/ Extremities: no rash, 2+ edema to mid shin, no ulceration    Neurology/ Mental status: alert oriented   Psych: appropriate mood and affect      Laboratory:  Most Recent Data:  CBC:   Lab Results   Component Value Date    WBC 3.99 2020    HGB 13.3 (L) 2020    HCT 40.5 2020    PLT " 182 04/20/2020    MCV 88 04/20/2020    RDW 12.3 04/20/2020       BMP:   Lab Results   Component Value Date     (L) 04/20/2020    K 4.6 04/20/2020    CL 95 04/20/2020    CO2 22 (L) 04/20/2020    BUN 18 04/20/2020    CREATININE 1.4 04/20/2020    GLU 85 04/20/2020    CALCIUM 8.7 04/20/2020    MG 2.0 03/11/2020    PHOS 4.1 02/21/2020     LFTs:   Lab Results   Component Value Date    PROT 7.6 04/20/2020    ALBUMIN 3.6 04/20/2020    BILITOT 2.6 (H) 04/20/2020     (H) 04/20/2020    ALKPHOS 109 04/20/2020     (H) 04/20/2020     Coags:   Lab Results   Component Value Date    INR 1.1 02/17/2020     FLP: No results found for: CHOL, HDL, LDLCALC, TRIG, CHOLHDL  DM:   Lab Results   Component Value Date    CREATININE 1.4 04/20/2020     Thyroid: No results found for: TSH, FREET4, V4FWPUM, I3XDKNB, THYROIDAB  Anemia:   Lab Results   Component Value Date    FERRITIN 80 04/09/2020     Cardiac:   Lab Results   Component Value Date    TROPONINI 0.056 (H) 04/20/2020    BNP 2,058 (H) 04/20/2020     Urinalysis:   Lab Results   Component Value Date    COLORU Yellow 04/14/2020    SPECGRAV 1.020 04/14/2020    NITRITE Negative 04/14/2020    KETONESU Negative 04/14/2020    UROBILINOGEN 1.0 04/14/2020    WBCUA 2 04/14/2020       Trended Lab Data:  Recent Labs   Lab 04/14/20 2022 04/20/20  2140   WBC 4.44 3.99   HGB 13.8* 13.3*   HCT 42.6 40.5    182   MCV 89 88   RDW 12.5 12.3    130*   K 4.3 4.6    95   CO2 21* 22*   BUN 18 18   CREATININE 1.4 1.4    85   PROT 7.4 7.6   ALBUMIN 3.6 3.6   BILITOT 2.1* 2.6*   * 113*   ALKPHOS 100 109   * 138*       Trended Cardiac Data:  Recent Labs   Lab 04/14/20 2022 04/14/20  2307 04/20/20  2140   TROPONINI 0.094* 0.080* 0.056*   BNP 2,540*  --  2,058*       Microbiology Data:  none    Other Results:  EKG (my interpretation): NSR    Radiology:  Imaging Results           CTA Chest Non-Coronary - PE Study (Final result)  Result time 04/21/20 00:36:14     Final result by Hima Clark MD (04/21/20 00:36:14)                 Impression:      1.  Asymmetric poor opacification involving multiple distal right lower lobe branches.  This may be artifactual secondary to combination of extensive respiratory motion as well as contrast bolus timing given elevated right-sided heart pressures, although distal right lower lobe pulmonary embolism cannot be excluded on the basis of examination.  Clinical correlation with D-dimer and lower extremity venous ultrasound is advised.  Further assessment with short-term follow-up CTA examination as clinically warranted.    2.  Significant cardiomegaly with apparent small pericardial effusion.  Reflux of contrast into the hepatic veins most likely relating to elevated right-sided heart pressures.    3.  1.1 cm nodular opacity within the left upper lobe.  This is nonspecific and may be infectious, inflammatory, or neoplastic in etiology.  Continued follow-up is recommended with noncontrast chest CT in 3 months for further assessment.    4.  Moderate volume right-sided pleural effusion.  Patchy ground-glass opacities and atelectasis at the right lung base which may relate to asymmetric edema, infection, aspiration, hemorrhage, or noninfectious inflammatory process.    5.  Partially visualized abdominal upper abdominal ascites.    6.  Additional findings as above.    This report was flagged in Epic as abnormal.      Electronically signed by: Hima Clark MD  Date:    04/21/2020  Time:    00:36             Narrative:    EXAMINATION:  CTA CHEST NON CORONARY    CLINICAL HISTORY:  Chest pain, acute, PE suspected, intermed prob, positive D-dimer;    TECHNIQUE:  Low dose axial images, sagittal and coronal reformations were obtained from the thoracic inlet to the lung bases following the IV administration of 100 mL of Omnipaque 350.  Contrast timing was optimized to evaluate the pulmonary arteries.  MIP images were  performed.    COMPARISON:  Chest radiograph 04/20/2020    FINDINGS:  No acute abnormalities of the visualized soft tissues of the base of the neck.    The thoracic aorta maintains normal caliber, contour, and course without significant atherosclerotic calcification within its course.  There is no evidence of aneurysmal dilation. The heart is significantly enlarged and there is an apparent small pericardial effusion.The esophagus maintains a normal course and caliber.There are scattered mildly prominent mediastinal and bilateral axillary lymph nodes which are nonspecific, although may be reactive or secondary to volume status.    The trachea is midline and proximal airways are patent.  Detailed evaluation of the lung parenchyma is somewhat limited secondary to significant respiratory motion.  There is a moderate volume layering right-sided pleural effusion.  There are patchy right lower lobe ground-glass opacities and compressive atelectasis.  There is a nonspecific 1.1 cm nodular opacity within the left upper lobe (axial series 2, image 98).  The remaining lungs relatively well aerated.  There is suggestion of underlying emphysematous change allowing for respiratory motion.  There is no pneumothorax.  There is no significant volume of left-sided pleural fluid.    Evaluation of the pulmonary arteries is limited secondary to dense streak artifact from contrast bolus in the SVC and right atrium as well as extensive respiratory motion, particularly at the lung bases.  There is no evidence of large central saddle type pulmonary embolus.  There is asymmetric poor opacification involving the distal right lower lobe branches and pulmonary thromboembolism would be difficult to exclude.  No definite additional discrete filling defects identified.    Limited images of the upper abdomen obtained during the course of this dedicated thoracic CT demonstrates reflux of contrast into the hepatic veins most compatible with elevated  right-sided heart pressures.  There is upper abdominal ascites present.    The osseous structures demonstrate no acute abnormalities.                               X-Ray Chest AP Portable (Final result)  Result time 04/20/20 21:15:30    Final result by Delma Cornelius MD (04/20/20 21:15:30)                 Impression:      Enlargement the cardiac silhouette.  The possibility of underlying pericardial effusion cannot entirely be excluded.    Increase in right basilar atelectasis.      Electronically signed by: Delma Cornelius  Date:    04/20/2020  Time:    21:15             Narrative:    EXAMINATION:  AP PORTABLE CHEST    CLINICAL HISTORY:  Shortness of breath    TECHNIQUE:  AP portable chest radiograph was submitted.    COMPARISON:  04/14/2020    FINDINGS:  AP portable chest radiograph demonstrates enlargement of the cardiac silhouette.  There is mild increase in right basilar density with corresponding mild elevation of the right hemidiaphragm.  Findings favor right basilar atelectatic change.  There is questionable mild underlying interstitial prominence.  The possibility of underlying pulmonary vascular congestion may be considered.  There is no pneumothorax or significant pleural effusion.                                   Assessment/Plan:     Anirudh David is a 59 y.o. male with:    Acute on Chronic Combined HF (45-50%) with severe mitral regurg  -hx of increasing LE edema and orthopnea x 3 days  -echo (2/2020): EF 45-50%, grade III LVD dysfxn, pulmonary HTN, severe mitral regurg  -clinically fluid overloaded on exam with BNP 2,058  -ED intervention: lasix 20 mg IV with 1 L urinary output  -will give additional 60 mg IV and start lisinopril; will need BB at discharge    Suspicion for Pulmonary Embolism  -denies hx of sudden onset chest pain/SOB  -CTA unable to r/o PE  d-dimer 2.69  Venous LE US ordered  -given lovenox in ED  -will start eliquis for treatment of suspected PE     L Upper Lobe Lung  Nodule  -as noted in CTA  -follow up with pulm as outpt    Reported COPD  -home albuterol inhaler  -albuterol nebulizer while inpatient   -never officially diagnosed; will need pulm referral on discharge    Untreated Hep C with transaminitis   -   around baseline  -will need referral for outpt tx upon discharge    Tobacco Dependence  -nicotine patch while inpatient      Normocytic Anemia, likely AoCI  -Hgb 13.3 around baseline   -ctm            Code Status:         Code: full  DVT ppx: eliquis  Diet: cardiac    Amilcar Matamoros MD  [facility] HO-I    [facility] Medicine Hospitalist Pager numbers:   [facility] Hospitalist Medicine Team A (Mony/Stefan): [phone]  [facility] Hospitalist Medicine Team B (Ni/Gladis):  [phone]

## 2020-04-21 NOTE — NURSING
Patients sister contacted to verify transport. Requested wheelchair, patient notified that transport was requested for discharge at main entrance. Patient refused to wait and walked down for discharge.

## 2020-04-21 NOTE — PLAN OF CARE
VN cued into patients room to review DC instructions - pt informed VN that he has had angioedema with taking lisinopril in the past. Pt did receive a dose this morning - pt notified that VN will contact MD to inform of the above. Waiting on callback. Bedside nurse aware and updated.     Received callback from MD - to DC lisinopril on DC and adjust meds - bedside nurse updated.
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
## 2020-04-21 NOTE — PLAN OF CARE
Pt being d/c home; Tn scheduled f/u appt in [facility] and sent message for Cardiology f/u as well; Pt nor sister can recall name of Cardiologist. Pt not seen in Dr. Rm office for cards.  Pt's sister informed of d/c and will come  pt . Tn reinforced importance of follow up and compliance as pt has CHF.   Tn notified floor nurse and VN pt ready from Tn standpoint.     04/21/20 1212   Final Note   Assessment Type Final Discharge Note   Anticipated Discharge Disposition Home   What phone number can be called within the next 1-3 days to see how you are doing after discharge? [phone]  (Kamilla (sister) ; pt does NOT have a cell)   Hospital Follow Up  Appt(s) scheduled? Yes   Discharge plans and expectations educations in teach back method with documentation complete? Yes   Right Care Referral Info   Post Acute Recommendation No Care

## 2020-04-21 NOTE — NURSING
Pt admitted to unit floor in stable condition. On telemetry, pt noted with frequent PVCs, trigeminy on monitor, 90s. VS stable, 134/94. Dr Ruelas paged and notified on return call. No orders given. Pt denies any complaitns. Will CTCM. Also notified Dr Ruelas of diastolic BP running 90s-low 100s. No orders given. Pt denies any complaints. Vancomycin IVPB continued from ED, approximately 50cc remains.

## 2020-04-21 NOTE — PROGRESS NOTES
Pharmacokinetic Initial Assessment: IV Vancomycin    Assessment/Plan:    Initiate intravenous vancomycin with loading dose of 1750 mg once   Desired empiric serum trough concentration is 15 to 20 mcg/mL  Please contact pharmacy at extension [phone] with any questions regarding this assessment.     Thank you for the consult,   Nathanael Mclean       Patient brief summary:  Anirudh David is a 59 y.o. male initiated on antimicrobial therapy with IV Vancomycin for treatment of suspected lower respiratory infection    Drug Allergies:   Review of patient's allergies indicates:  No Known Allergies    Actual Body Weight:   72kg    Renal Function:   Estimated Creatinine Clearance: 59.1 mL/min (based on SCr of 1.4 mg/dL).,     Dialysis Method (if applicable):  N/A    CBC (last 72 hours):  Recent Labs   Lab Result Units 04/20/20  2140   WBC K/uL 3.99   Hemoglobin g/dL 13.3*   Hematocrit % 40.5   Platelets K/uL 182   Gran% % 48.1   Lymph% % 39.1   Mono% % 10.3   Eosinophil% % 1.0   Basophil% % 1.0   Differential Method  Automated       Metabolic Panel (last 72 hours):  Recent Labs   Lab Result Units 04/20/20  2140   Sodium mmol/L 130*   Potassium mmol/L 4.6   Chloride mmol/L 95   CO2 mmol/L 22*   Glucose mg/dL 85   BUN, Bld mg/dL 18   Creatinine mg/dL 1.4   Albumin g/dL 3.6   Total Bilirubin mg/dL 2.6*   Alkaline Phosphatase U/L 109   AST U/L 113*   ALT U/L 138*       Drug levels (last 3 results):  No results for input(s): VANCOMYCINRA, VANCOMYCINPE, VANCOMYCINTR in the last 72 hours.    Microbiologic Results:  Microbiology Results (last 7 days)       ** No results found for the last 168 hours. **

## 2020-04-21 NOTE — PLAN OF CARE
Pt lives with his sister who reports she can provide help and transportation home.      04/21/20 1154   Discharge Assessment   Assessment Type Discharge Planning Assessment   Confirmed/corrected address and phone number on facesheet? Yes   Assessment information obtained from? Patient;Caregiver   Expected Length of Stay (days) 2   Communicated expected length of stay with patient/caregiver yes   Prior to hospitilization cognitive status: Alert/Oriented   Prior to hospitalization functional status: Independent   Current cognitive status: Alert/Oriented   Current Functional Status: Independent   Lives With sibling(s)   Able to Return to Prior Arrangements yes   Is patient able to care for self after discharge? Yes   Who are your caregiver(s) and their phone number(s)? Aleksandra( sister) [phone]   Patient's perception of discharge disposition home or selfcare   Readmission Within the Last 30 Days no previous admission in last 30 days   Patient currently being followed by outpatient case management? No   Patient currently receives any other outside agency services? No   Equipment Currently Used at Home none   Do you have any problems affording any of your prescribed medications? No   Is the patient taking medications as prescribed? yes   Does the patient have transportation home? Yes   Transportation Anticipated family or friend will provide   Discharge Plan A Home   Discharge Plan B Home with family   DME Needed Upon Discharge  none   Patient/Family in Agreement with Plan yes

## 2020-04-21 NOTE — PLAN OF CARE
VN cued into patients room for q2h rounding.  Patient resting in bed with blanket over head.   O2 in place: 1L NC as documented.  Cont pulse ox: not in place  RR unable to visualize    Call bell within reach. Will continue to monitor.

## 2020-04-21 NOTE — ED NOTES
Spoke with Admitting team regarding US of lower extremities. admiting team to change order from stat to routine.

## 2020-04-21 NOTE — PLAN OF CARE
VN cued into patients room for DC instructions. Discharge teaching was reviewed with patient. Pt verbalized understanding of medications/changes to medications, FU appts, clinical education and resources, including Covid19 instructions. Refer to clinical references for pt education. Tele and IV to be removed by bedside nurse prior to leaving. All questions answered. Wheelchair transport to be requested once family arrives.

## 2020-04-21 NOTE — NURSING
Patient reporting cramps to his left arm. Team contacted, patient requesting a medication to treat symptoms or MD to come evaluate him. Team will place corresponding orders.

## 2020-04-21 NOTE — DISCHARGE INSTRUCTIONS
Metoprolol tablets (English) View Edit Remove  Furosemide tablets (English) View Edit Remove  Heart Failure, Discharge Instructions for (English) View Edit Remove  Heart Failure: Warning Signs of a Flare-Up (English) View Edit Remove  Smoking Cessation (English) View Edit Remove  COVID-19 DISCHARGE INSTRUCTIONS View Edit Remove

## 2020-04-23 NOTE — TELEPHONE ENCOUNTER
Attempted to call patient for a Smoking Cessation telephone appointment. I left a message stating our appointment time and assured I would try later today or to return my call from Tali Kingston at [phone]

## 2020-04-25 NOTE — ED PROVIDER NOTES
Encounter Date: 4/24/2020    SCRIBE #1 NOTE: I, Nova Dimas, am scribing for, and in the presence of,  Barbara Whatley MD. I have scribed the entire note.       History     Chief Complaint   Patient presents with    Shortness of Breath     SOB, cough, lack of appetite, chills that started 1.5 weeks ago. Has been swabbed 3 times for COVID. All negative. Symptoms not approving. Pt states a month ago he was diagnosed with pnuemonia      Time seen by provider: 11:34 PM    This is a 58 y/o male with PMHx of HTN, NSTEMI (02/17), HFrEF (45%), untreated Hep C, and reported COPD who presents with complaint of productive cough of white mucus and SOB x 1.5 weeks. His associated symptoms include leg swelling and chest tightness. The patient denies any other concerning symptoms. He has been using his inhalers with some relief. The patient is currently taking 40 mg Lasix twice a day.  He was discharged from here 3 days ago after being treated for CHF exacerbation.  Patient recently tested negative for COVID 19 on 4/20/20 and 4/9/20.        The history is provided by the patient.     Review of patient's allergies indicates:   Allergen Reactions    Lisinopril Swelling     Past Medical History:   Diagnosis Date    CHF (congestive heart failure)     COPD (chronic obstructive pulmonary disease)     Dermatitis     Hypertension      Past Surgical History:   Procedure Laterality Date    CORONARY ANGIOGRAPHY N/A 2/17/2020    Procedure: ANGIOGRAM, CORONARY ARTERY;  Surgeon: Tomy Patterson MD;  Location: [facility] CATH LAB/EP;  Service: Cardiology;  Laterality: N/A;    HAND SURGERY      LEFT HEART CATHETERIZATION N/A 2/17/2020    Procedure: Left heart cath;  Surgeon: Tomy Patterson MD;  Location: [facility] CATH LAB/EP;  Service: Cardiology;  Laterality: N/A;    MANDIBLE FRACTURE SURGERY       No family history on file.  Social History     Tobacco Use    Smoking status: Former Smoker     Packs/day: 1.00     Years: 34.00     Pack  years: 34.00     Types: Cigarettes     Last attempt to quit: 3/24/2020     Years since quittin.0    Smokeless tobacco: Never Used   Substance Use Topics    Alcohol use: Not Currently    Drug use: No     Review of Systems   Constitutional: Negative for chills and fever.   HENT: Negative for sore throat.    Eyes: Negative for visual disturbance.   Respiratory: Positive for cough (productive with white mucus), chest tightness and shortness of breath.    Cardiovascular: Positive for leg swelling. Negative for chest pain.   Gastrointestinal: Negative for abdominal pain, nausea and vomiting.   Genitourinary: Negative for dysuria and hematuria.   Musculoskeletal: Negative for back pain, neck pain and neck stiffness.   Skin: Negative for rash and wound.   Neurological: Negative for syncope, weakness and headaches.   Psychiatric/Behavioral: Negative for confusion.       Physical Exam     Initial Vitals   BP Pulse Resp Temp SpO2   20 -- 20 2114 20 --   112/85  (!) 70 97.8 °F (36.6 °C)       MAP       --                Physical Exam    Nursing note and vitals reviewed.  Constitutional: He appears well-developed and well-nourished. He is not diaphoretic.  Non-toxic appearance. He does not appear ill. No distress.   HENT:   Head: Normocephalic and atraumatic.   Eyes: Conjunctivae and EOM are normal.   Neck: Normal range of motion. Neck supple.   No meningismus   Cardiovascular: Normal rate and regular rhythm. Exam reveals no gallop and no friction rub.    Murmur (systolic) heard.  Pulmonary/Chest: Effort normal and breath sounds normal. No accessory muscle usage. No respiratory distress. He has no wheezes. He has no rhonchi. He has no rales.   Abdominal: Soft. There is no tenderness. There is no rebound and no guarding.   Musculoskeletal: Normal range of motion. He exhibits edema (2+ pitting edema at bilateral LE). He exhibits no tenderness.        Right lower leg: He exhibits edema.         Left lower leg: He exhibits edema.   Neurological: He is alert and oriented to person, place, and time. He has normal strength.   Skin: Skin is warm and dry. Capillary refill takes less than 2 seconds. No rash noted.         ED Course   Procedures  Labs Reviewed   CBC W/ AUTO DIFFERENTIAL - Abnormal; Notable for the following components:       Result Value    Hemoglobin 13.8 (*)     All other components within normal limits   COMPREHENSIVE METABOLIC PANEL - Abnormal; Notable for the following components:    Sodium 126 (*)     Chloride 92 (*)     CO2 21 (*)     Glucose 115 (*)     BUN, Bld 27 (*)     Creatinine 1.6 (*)     Calcium 8.6 (*)     Total Bilirubin 1.8 (*)     AST 84 (*)     ALT 95 (*)     eGFR if  54 (*)     eGFR if non  47 (*)     All other components within normal limits   B-TYPE NATRIURETIC PEPTIDE - Abnormal; Notable for the following components:    BNP 2,332 (*)     All other components within normal limits   TROPONIN I - Abnormal; Notable for the following components:    Troponin I 0.061 (*)     All other components within normal limits   URINALYSIS, REFLEX TO URINE CULTURE - Abnormal; Notable for the following components:    Protein, UA 1+ (*)     All other components within normal limits   URINALYSIS MICROSCOPIC - Abnormal; Notable for the following components:    Bacteria Few (*)     All other components within normal limits            X-Rays:   Independently Interpreted Readings:   Other Readings:  Reviewed by myself, read by radiology.     Imaging Results          X-Ray Chest AP Portable (Final result)  Result time 04/24/20 22:30:28    Final result by Kristel Welch MD (04/24/20 22:30:28)                 Impression:      As above.      Electronically signed by: Kristel Welch MD  Date:    04/24/2020  Time:    22:30             Narrative:    EXAMINATION:  XR CHEST AP PORTABLE    CLINICAL HISTORY:  Shortness of breath    TECHNIQUE:  Single frontal view of the  chest was performed.    COMPARISON:  04/20/2020.    FINDINGS:  There is stable prominent cardiomegaly.  Right-sided pleural effusion is seen with consolidation/atelectasis seen within the right lower lobe.  Potential underlying pneumonia not excluded in the right clinical setting.  No significant left-sided pleural effusion.  No evidence of pneumothorax.                              Medical Decision Making:   History:   Old Medical Records: I decided to obtain old medical records.  Old Records Summarized: records from previous admission(s).       <> Summary of Records: 02/17 - PT presented to ED with cough and SOB. Admitted due to NSTEMI and received cardiac catheterization. Discharged on 02/19.  02/20 - PT returns to ED after no improvement of cough and SOB  03/11 - PT presents to ED with worsening SOB and admitted with CHF exacerbation  Initial Assessment:   Past medical records queried and reviewed, including prior ED visits on 02/17, 02/20, and 03/11.     This is a 58 y/o male with PMHx of HTN and NSTEMI (02/17) who presents with complaint of productive cough of white mucus and SOB x1.5 weeks. The patient was seen and examined. The history and physical exam was obtained. The nursing notes and vital signs were reviewed.     Secondary to symptoms and exam findings we ordered:    Labs: BNP, Troponin I, UA, CBC, CMP    Imaging: CXR AP portable    Other: EKG          Differential Diagnosis:   DDx include, but are not limited to, pulmonary infectious process, COPD, asthma, pulmonary embolus and congestive heart failure.        Independently Interpreted Test(s):   I have ordered and independently interpreted EKG Reading(s) - see prior notes  Clinical Tests:   Lab Tests: Reviewed and Ordered  Radiological Study: Reviewed and Ordered  Medical Tests: Reviewed and Ordered  ED Management:  - BNP elevated  - CXR with effusions  - sodium 126    Given 80 mg IV lasix in ED  Discussed with [facility].  They have been consulted for  further management and will see patient in ED.   [facility] will make final disposition                   ED Course as of Apr 25 0405 Fri Apr 24, 2020   2340 EKG with NSR, rate of 71 bpm.  + RAD, incomplete RBBB, no STEMI    [LD]      ED Course User Index  [LD] Barbara Whatley MD                Clinical Impression:       ICD-10-CM ICD-9-CM   1. Congestive heart failure, unspecified HF chronicity, unspecified heart failure type I50.9 428.0   2. Shortness of breath R06.02 786.05   3. Hyponatremia E87.1 276.1   4. Pleural effusion J90 511.9                 I, Barbara Whatley,  personally performed the services described in this documentation. All medical record entries made by the scribe were at my direction and in my presence.  I have reviewed the chart and agree that the record reflects my personal performance and is accurate and complete. Barbara Whatley M.D. 4:05 AM04/25/2020             Barbara Whatley MD  04/25/20 0406

## 2020-04-25 NOTE — NURSING
Pt educated on room orientation, VSS, appears in no distress, ordered lunch meal tray for patient. Pt's goal is to cough up mucous, breathe better, urinate more with lasix.
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
## 2020-04-25 NOTE — ED NOTES
"PT PRESENTS WITH C/O SOB. PT HAS BEEN SEEN HERE MULTIPLE TIMES THIS MONTH FOR SAME COMPLAINT. PT WITH NO LABORED BREATHING, CAN SPEAK IN FULL SENTENCES WITH NO RESP RETRACTIONS NOTED. PT QUIT SMOKING 1 WEEK AGO. PT'S O2 SATS 100% ON ROOM AIR. PT ALSO C/O COUGH, DECREASED APPETITE.  Patient identifiers verified by spelling and stated name on armband along with .   APPEARANCE: Alert, oriented and in no acute distress.  CARDIAC: Normal rate and rhythm, no murmur heard.   PERIPHERAL VASCULAR: peripheral pulses present. Normal cap refill. No edema. COOL to touch.    RESPIRATORY:PT C/O SOB  .Normal rate and effort, breath sounds clear bilaterally throughout chest. Respirations are equal and unlabored no obvious signs of distress. O2SATS 100% ON ROOM AIR. PT "SPITTING UP" CLEAR THICK PHLEGM.  GASTRO: soft, bowel sounds normal, no tenderness, no abdominal distention.  MUSC: Full ROM. No bony tenderness or soft tissue tenderness. No obvious deformity.  SKIN: Skin is warm and dry, normal skin turgor, mucous membranes moist.  NEURO: 5/5 strength major flexors/extensors bilaterally. Sensory intact to light touch bilaterally. Sara coma scale: eyes open spontaneously-4, oriented & converses-5, obeys commands-6. No neurological abnormalities.   MENTAL STATUS: awake, alert and aware of environment.  EYE: PERRL, both eyes: pupils brisk and reactive to light. Normal size.  ENT: EARS: no obvious drainage. NOSE: no active bleeding.   Patient verbalized understanding of status and plan of care. Patient changed into hospital gown.   Patient side rails are up x 2, bed is low and locked, call light is in reach.  Cardiac monitor (alarms on, set, and audible), pulse oximeter, and automatic blood pressure cuff applied.   Will continue to monitor.  "

## 2020-04-25 NOTE — PLAN OF CARE
VN cued into room to complete admit assessment, VIP model introduced, VN working alongside bedside treatment team.  Plan of care reviewed with patient. Patient verbalized understanding. Patient informed of fall risk and fall precautions, call light within reach, 2x bed rails. Patient notified to ask staff for assistance and pt verbalized complete understanding. Time allowed for questions. Will continue to monitor and intervene as needed.  On room air   Mild dyspnea noted   Pt stating he is breathing better. Telemetry intact   Eating lunch   Will adjust diet to assist with his ability to chew     Missing several molars

## 2020-04-26 NOTE — PROGRESS NOTES
"[facility] Resident HO-1 Progress Note    Subjective:   Anirudh David is a 59 y.o. male who is being followed by the [facility] service at [facility] for SOB 2/2 heart failure exacerbation.     Patient doing well overnight. Satting 86-99% on room air. HR controlled. BP stable. Afebrile. Patient denies chest pain, fever/chills, palpitations. Diuresing well with -4.5L response to IV lasix.  Objective:   Last 24 Hour Vital Signs:  BP  Min: 117/85  Max: 128/97  Temp  Av.5 °F (36.4 °C)  Min: 97.4 °F (36.3 °C)  Max: 97.7 °F (36.5 °C)  Pulse  Av.4  Min: 62  Max: 88  Resp  Av.4  Min: 16  Max: 18  SpO2  Av.7 %  Min: 86 %  Max: 99 %  Height  Av' 2" (188 cm)  Min: 6' 2" (188 cm)  Max: 6' 2" (188 cm)  Weight  Av.5 kg (148 lb 14.7 oz)  Min: 66.4 kg (146 lb 6.2 oz)  Max: 68.7 kg (151 lb 7.3 oz)  I/O last 3 completed shifts:  In: 125 [P.O.:125]  Out: 4525 [Urine:4525]  Physical Examination:  General: A&Ox3, NAD  Head: NCAT. Neck supple, no thyromegaly. + JVD  Eyes: PERRL; EOMi with anicteric sclera and clear conjunctivae  Cardio: RRR, blowing holosystolic murmur best heard at 5th ICS midclavicular line  Resp: Mild crackles BL, breath sounds significantly decreased to R lower lung field. Normal work of breathing.   Abdom: Soft, mildly distended, hyperactive bowel sounds   Extrem: 2+ pitting edema to bl ankles   Skin: No rashes, lesions, or color changes  Pulses: 2+ and symmetric distally  Neuro:No focal deficits.      Laboratory:  Recent Labs   Lab 20  2140 20  0007 20  0746 20  0545   WBC 3.99 4.85 5.66 5.41   HGB 13.3* 13.8* 13.9* 13.5*   HCT 40.5 42.7 43.9 40.0    209 185 202   * 126* 127* 131*   K 4.6 4.8 4.8 3.8   CL 95 92* 92* 94*   CREATININE 1.4 1.6* 1.6* 1.3   BUN 18 27* 31* 35*   CO2 22* 21* 17* 25   * 95*  --   --    * 84*  --   --    Microbiology:   COVID negative  Cardiology labs:  BNP 2,332  Trops 0.061   EKG (my " interpretation): no new studies  Radiology: no new studies  Current Medications:     Scheduled:   cetirizine  10 mg Oral Daily    fluticasone furoate-vilanteroL  1 puff Inhalation Daily    furosemide (LASIX) IV  80 mg Intravenous BID AC    metoprolol succinate  50 mg Oral Daily    nicotine  1 patch Transdermal Daily     Plan:   Acute on Chronic Combined HF (45-50%) with severe mitral regurg  - echo (2/2020): EF 45-50%, grade III LVD dysfxn, pHTN, severe MR  - Overnight afebrile, VSS, 86-99% on RA sitting  - Admit: Na 126, BNP 2,332 (2,058 prior admit), Trops 0.061 (0.056 prior admit)  - COVID negative x3  - Continue Toprol 50 mg daily  - Some concern for malignancy given persistent SOB, harsh cough, R pleural effusion, and lack of adequate improvement with diuresis. Also with L upper lobe nodule on imaging. 30+ year smoking history.  - Will discuss plans for further diuresis given new JANEL and hyponatremia  - Given response to IV lasix, will discuss switching diuretics to torsemide at home for better PO absorption  - 4/26 UOP 4.5L past 24h. Net -4.4L since admission.   - continue lasix 80mg IV BID     Hyponatremia  - hypervolemic (cardiorenal) vs hypovolemic (vomiting, diarrhea) vs euvolemic (SIADH)  - 4/26 Na improved 126>131, baseline 130 at prior admission      JANEL  - BUN/Cr 27/1.6; baseline 18/1.4 per chart review  - Followup urine studies, repeat BMP; FeNa less useful given patient on lasix  - Caution with fluids given HF  - 4/26 Cr 1.6>1.3     L Upper Lobe Lung Nodule  - as noted in CTA  - follow up with pulm as outpt     Reported COPD   - albuterol nebulizer ordered PRN while inpatient   - apparently not officially diagnosed; will need pulm referral on discharge     Untreated Hep C with transaminitis   - AST 84  ALT 95 (around baseline)  - will need referral for outpt tx upon discharge     Diarrhea  - patient reporting watery stools for past week, no other infectious symptoms  - Reports taking several  doses of mag citrate for prior constipation, has since stopped  - Continue to monitor, denies diarrhea during admission     Tobacco Dependence  -nicotine patch while inpatient       Normocytic Anemia, likely AoCI  -Hgb 13.8 (around baseline)  -Continue to monitor    Diet: cardiac  PPx: lovenox 40mg daily  Code: full  Dispo: diuresis, needs f/u with PCP, cardiology, pulmonary    Greg Watson MD  [facility] Internal Medicine HO-1  [facility] Hospitalist Team A    [facility] Medicine Hospitalist Pager numbers:   [facility] Hospitalist Medicine Team A (Mony/Stefan): [phone]  [facility] Hospitalist Medicine Team B (Ni/Gladis):  [phone]

## 2020-04-26 NOTE — PLAN OF CARE
Discharge orders noted, no HH or HME ordered.    Future Appointments   Date Time Provider Department Center   4/29/2020  9:20 AM Simone Root PA-C [facility]     Pt's nurse will go over medications/signs and symptoms prior to discharge       04/26/20 1111   Final Note   Assessment Type Final Discharge Note   Anticipated Discharge Disposition Home   What phone number can be called within the next 1-3 days to see how you are doing after discharge? [phone]   Hospital Follow Up  Appt(s) scheduled? No  (Ambulatory Referrals placed, their offices will contact patient with appointment information.)   Right Care Referral Info   Post Acute Recommendation No Care     Nereida Grant, RN Transitional Navigator  [phone]
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
## 2020-04-26 NOTE — PLAN OF CARE
Patient is AAO X 4. Vital signs are stable. Denies pain at present. Voided large volumes of urine. On tele monitor shows NSR. Slept fairly well. Will continue to monitor patient.

## 2020-04-26 NOTE — PLAN OF CARE
Patient has received discharge instructions. Prescriptions received. Instructions reviewed with pt using teachback method. All questions answered to pt satisfaction. IV access and telemetry removed per floor nurse. Transport to be requested for discharge on arrival of his ride on campus

## 2020-04-29 NOTE — PROGRESS NOTES
"Established Patient - Audio Only Telehealth Visit     The patient location is: Home  The chief complaint leading to consultation is: Hospital Follow Up/CHF  Visit type: Virtual visit with audio only (telephone)  Call Time: 16 min     The reason for the audio only service rather than synchronous audio and video virtual visit was related to technical difficulties or patient preference/necessity.     Each patient to whom I provide medical services by telemedicine is:  (1) informed of the relationship between the physician and patient and the respective role of any other health care provider with respect to management of the patient; and (2) notified that they may decline to receive medical services by telemedicine and may withdraw from such care at any time. Patient verbally consented to receive this service via voice-only telephone call.     HPI:   59 y.o. male PMH of CHF, COPD, and Hypertension. He was contacted today for hospital follow up after multiple ED visits and admissions for CHF exacerbation. Most recently he asked to leave the hospital but re-presented 3 days later with worsening LE swelling, progressive SOB. Diuresed well during admission and his outpatient diuretic was switched from lasix to torsemide 20mg BID.    Acute on Chronic Combined HF (45-50%) with severe mitral regurg  ECHO on 2/2020) with EF 45-50%, grade III LVD dysfxn, pHTN, severe MR    Today the patient reports feeling good and taking his medications as prescribed (metoprolol XL 50mg daily and torsemide 20mg BID). He reports that the swelling in his legs has significantly decreased and his SOB has improved to baseline. Still has some SOB with exertion, but has been watching his oxygen levels and they are "good". He has been weighing himself and his weights are stable. He told me that he knows he is "supposed to be within a 5 pound radius". He also has an appetite again and has been watching his salt intake. Cooking at home and not eating " fast food.     HTN:  He has been checking at home, but the only number he can tell me is 103/77. Denies dizziness or lightheadedness.     COPD   L Upper Lobe Lung Nodule  Does not have pulm appointment scheduled at this time. Will need outpatient follow up for left upper lobe nodule and COPD. Will order outpatient PFTs. Some concern for malignancy while inpatient given persistent SOB, harsh cough (COVID neg x3), R pleural effusion, and lack of adequate improvement with diuresis. Also with L upper lobe nodule on imaging. 30+ year smoking history.    Hyponatremia  Na improved 126>131 prior to d/c. Will repeat as outpatient.     JANEL  Cr at baseline prior to d/c. Will repeat CMP.    Untreated Hep C with transaminitis   Expressing concern about getting treatment for his chronic hep C.      Tobacco Dependence  Using nicotine patch and has not been smoking since discharge.    Review of Systems   Constitutional: Negative.    Eyes: Negative.    Respiratory:        SOB on exertion (baseline)   Cardiovascular: Negative.    Musculoskeletal: Negative.    Neurological: Negative for dizziness and headaches.   Psychiatric/Behavioral: Negative.      Diagnostics    ECHO  Conclusion     · Low normal left ventricular systolic function. The estimated ejection fraction is 45-50%.  · Mild left ventricular enlargement.  · Eccentric left ventricular hypertrophy.  · Grade III (severe) left ventricular diastolic dysfunction consistent with restrictive physiology.  · No wall motion abnormalities.  · Normal right ventricular systolic function.  · Pulmonary hypertension present.  · The estimated PA systolic pressure is 50 mmHg.  · Intermediate central venous pressure (8 mmHg).  · Severe left atrial enlargement.  · Moderate right atrial enlargement.  · Moderate mitral sclerosis: myxomatous  · Severe mitral regurgitation. Consider P2 myxomatous mitral valve prolapse. Would consider JOSE A and further valve disease workup     CT Chest  Impression        1.  Asymmetric poor opacification involving multiple distal right lower lobe branches.  This may be artifactual secondary to combination of extensive respiratory motion as well as contrast bolus timing given elevated right-sided heart pressures, although distal right lower lobe pulmonary embolism cannot be excluded on the basis of examination.  Clinical correlation with D-dimer and lower extremity venous ultrasound is advised.  Further assessment with short-term follow-up CTA examination as clinically warranted.    2.  Significant cardiomegaly with apparent small pericardial effusion.  Reflux of contrast into the hepatic veins most likely relating to elevated right-sided heart pressures.    3.  1.1 cm nodular opacity within the left upper lobe.  This is nonspecific and may be infectious, inflammatory, or neoplastic in etiology.  Continued follow-up is recommended with noncontrast chest CT in 3 months for further assessment.    4.  Moderate volume right-sided pleural effusion.  Patchy ground-glass opacities and atelectasis at the right lung base which may relate to asymmetric edema, infection, aspiration, hemorrhage, or noninfectious inflammatory process.    5.  Partially visualized abdominal upper abdominal ascites.    6.  Additional findings as above.     Assessment and plan:  Diagnoses and all orders for this visit:    Chronic combined systolic and diastolic heart failure   -At this time the patient seems to be doing well on his current medications and we will continue. Repeat labs prior to follow up in two weeks to re-evaluate renal function, sodium, and potassium.    -Considering his severe diastolic dysfunction, mildly decreased EF, and persistently elevated BNP he may benefit from addition of MRA (spirnolactone 12.5?) depending on BP (possibly a bit hypotensive), potassium, and renal function   -Continue daily weights and salt restriction at this time   -Continue metoprolol XL 50mg daily and torsemide 20mg  BID; ACEi held due to h/o angioedema, but could consider ARB with low cross-reactivity (avoid losartan)  -     Ambulatory referral/consult to Cardiology; Future  -     Brain Natriuretic Peptide; Future    Pulmonary nodule, left   -Left upper lobe nodule noted on CT  -     Ambulatory referral/consult to Pulmonology; Future    Chronic hepatitis C without hepatic coma   -Will check lab work to re-evaluate for possible outpatient treatment through primary or need for referral   -At this time he is likely candidate for treatment with our clinic APRI 0.438 and FIB4 2.47  -     CBC auto differential; Future  -     Protime-INR; Future  -     HEPATITIS C RNA, QUANTITATIVE, PCR; Future  -     Hepatitis B Surface Antigen; Future  -     Hepatitis A antibody, IgG; Future  -     Hepatitis B Surface Ab, Qualitative; Future  -     HEPATITIS B CORE ANTIBODY, IGM; Future  -     Hepatitis B Core Antibody, Total; Future  -     HIV 1/2 Ag/Ab (4th Gen); Future    Severe mitral regurgitation   -JOSE A for further evaluation of valvular disease; will defer to cardiology  -     Ambulatory referral/consult to Cardiology; Future    Essential hypertension   -Possibly a bit hypotensive on home measurements; continue to monitor at home     JANEL (acute kidney injury)  -     Comprehensive metabolic panel; Future    Centrilobular emphysema  -     Ambulatory referral/consult to Pulmonology; Future  -     Complete PFT w/ bronchodilator; Future      Future Appointments   Date Time Provider Department Center   5/13/2020  9:20 AM Simone oRot PA-C [facility]        This service was not originating from a related E/M service provided within the previous 7 days nor will  to an E/M service or procedure within the next 24 hours or my soonest available appointment.  Prevailing standard of care was able to be met in this audio-only visit.

## 2020-04-30 NOTE — TELEPHONE ENCOUNTER
Left a message regarding a missed Smoking Cessation appointment. Left my name and number to call back to joseph Kingston [phone].

## 2020-05-13 NOTE — PROGRESS NOTES
"Established Patient - Audio Only Telehealth Visit     The patient location is: Home  The chief complaint leading to consultation is: Follow up Hep C  Visit type: Virtual visit with audio only (telephone)  Total time spent with patient: 8 min    The reason for the audio only service rather than synchronous audio and video virtual visit was related to technical difficulties or patient preference/necessity.     Each patient to whom I provide medical services by telemedicine is:  (1) informed of the relationship between the physician and patient and the respective role of any other health care provider with respect to management of the patient; and (2) notified that they may decline to receive medical services by telemedicine and may withdraw from such care at any time. Patient verbally consented to receive this service via voice-only telephone call.       HPI:  59 y.o. male PMH of CHF, COPD, and Hypertension. He was contacted today for hospital follow up after multiple ED visits and admissions for CHF exacerbation. Most recently he asked to leave the hospital but re-presented 3 days later with worsening LE swelling, progressive SOB. Diuresed well during admission and his outpatient diuretic was switched from lasix to torsemide 20mg BID.     Acute on Chronic Combined HF (45-50%) with severe mitral regurg  ECHO on 2/2020) with EF 45-50%, grade III LVD dysfxn, pHTN, severe MR     Today the patient reports feeling "real good" and still taking his medications as prescribed (metoprolol XL 50mg daily and torsemide 20mg BID). Swelling in his legs is still decreased and his SOB has stable. Weights are also stable. Has not been drinking or smoking. Has been wearing nicotine patches.    HTN:  He has been checking at home. 110-120/80s. Denies dizziness or lightheadedness.     COPD   L Upper Lobe Lung Nodule  Does not have pulm appointment scheduled at this time. Will need outpatient follow up for left upper lobe nodule and COPD. " Will order outpatient PFTs. Some concern for malignancy while inpatient given persistent SOB, harsh cough (COVID neg x3), R pleural effusion, and lack of adequate improvement with diuresis. Also with L upper lobe nodule on imaging. 30+ year smoking history.     Hyponatremia  Na improved 126>131 prior to d/c. Will repeat as outpatient.     JANEL  Cr at baseline prior to d/c. Will repeat CMP.     Untreated Hep C with transaminitis   Today his main concern is getting his Hep C treatment started. He will go get his lab work done so that we can discuss moving forward.      Tobacco Dependence  Using nicotine patch and has not been smoking since discharge.     Assessment and plan:  Diagnoses and all orders for this visit:    Chronic hepatitis C without hepatic coma   -Will repeat lab work prior to next visit and will likely begin outpatient treatment. Will need US at some point.     Smoker   -Has not been smoking and still using nicotine patch    Essential hypertension   -Taking medications as prescribed and well controlled according to home monitoring    Chronic combined systolic and diastolic heart failure   -Taking medications, swelling improved, weights stable      Future Appointments   Date Time Provider Department Center   5/13/2020  9:40 AM Simone Root PA-C [facility]   5/20/2020  8:40 AM Simone Root PA-C [facility]       This service was not originating from a related E/M service provided within the previous 7 days nor will  to an E/M service or procedure within the next 24 hours or my soonest available appointment.  Prevailing standard of care was able to be met in this audio-only visit.

## 2020-05-19 NOTE — Clinical Note
Patient states he's tobacco free as of 3/24/2020. Commended patient on success. He is back in the hospital currently but spoke over the phone, spoke with his sister as well. She states he's not asked for a cigarette and he's doing well with not smoking. He reports to not having urges but that it's easy when he's in the hospital. Discussed alternatives, for when he returns home. The patient remains on the prescribed tobacco cessation medication regimen of 21 mg Patches without any negative side effects at this time. Will order 14 mg patches to wean down, sent through mail order. The patient denies any abnormal behavioral or mental changes at this time. The patient will continue with group therapy sessions and medication monitoring by CTTS. Prescribed medication management will be by physician.

## 2020-05-19 NOTE — TELEPHONE ENCOUNTER
Left a message regarding a missed Smoking Cessation appointment. Left my name and number to call back to joseph Kingston [phone].

## 2020-05-19 NOTE — PROGRESS NOTES
Individual Follow-Up Form    5/19/2020    Quit Date: 3/24/2020    Clinical Status of Patient: Outpatient    Length of Service: 30 MINUTES    Continuing Medication: yes  Patches    Other Medications:      Target Symptoms: Withdrawal and medication side effects. The following were  rated moderate (3) to severe (4) on TCRS:  · Moderate (3): None  · Severe (4): None    Comments: Patient states he's tobacco free as of 3/24/2020. Commended patient on success. He is back in the hospital currently but spoke over the phone, spoke with his sister as well. She states he's not asked for a cigarette and he's doing well with not smoking. He reports to not having urges but that it's easy when he's in the hospital. Discussed alternatives, for when he returns home. The patient remains on the prescribed tobacco cessation medication regimen of 21 mg Patches without any negative side effects at this time. Will order 14 mg patches to wean down, sent through mail order. The patient denies any abnormal behavioral or mental changes at this time. The patient will continue with group therapy sessions and medication monitoring by CTTS. Prescribed medication management will be by physician.     Diagnosis: F17.210    Next Visit: 2 weeks

## 2020-06-15 PROBLEM — J81.0 ACUTE PULMONARY EDEMA: Status: RESOLVED | Noted: 2020-02-20 | Resolved: 2020-01-01

## 2020-09-23 PROBLEM — D64.9 ANEMIA REQUIRING TRANSFUSIONS: Status: ACTIVE | Noted: 2020-01-01

## 2020-09-23 PROBLEM — R31.9 HEMATURIA: Status: ACTIVE | Noted: 2020-01-01

## 2020-09-23 PROBLEM — D64.9 ANEMIA: Chronic | Status: ACTIVE | Noted: 2020-01-01

## 2020-09-23 NOTE — ED NOTES
APPEARANCE: Alert, oriented and in no acute distress.  CARDIAC: Normal rate and rhythm, no murmur heard.   PERIPHERAL VASCULAR: peripheral pulses present. Normal cap refill. No edema. Warm to touch.    RESPIRATORY:Normal rate and effort, breath sounds clear bilaterally throughout chest. Respirations are equal and unlabored no obvious signs of distress.  GASTRO: soft, bowel sounds normal, no tenderness, no abdominal distention.  MUSC: Limited ROM to lower back. Soft tissue tenderness. No obvious deformity.  SKIN: Skin is warm and dry, normal skin turgor, mucous membranes moist.  NEURO: 5/5 strength major flexors/extensors bilaterally. Sensory intact to light touch bilaterally. [place] coma scale: eyes open spontaneously-4, oriented & converses-5, obeys commands-6. No neurological abnormalities.   MENTAL STATUS: awake, alert and aware of environment.  EYE: PERRL, both eyes: pupils brisk and reactive to light. Normal size.  ENT: EARS: no obvious drainage. NOSE: no active bleeding.   Pt complaining of lower back pain and blood in urine.

## 2020-09-23 NOTE — FIRST PROVIDER EVALUATION
Emergency Department TeleTriage Encounter Note      CHIEF COMPLAINT    Chief Complaint   Patient presents with    Back Pain     reports chronic lower back pain. reports normally takes tramadol for pain but ran out two days.     Hematuria     reports hematuria for the past week. denies dysuria. reports takes blood thinners.        VITAL SIGNS   Initial Vitals [09/23/20 0952]   BP Pulse Resp Temp SpO2   103/67 86 20 99 °F (37.2 °C) 100 %      MAP       --            ALLERGIES    Review of patient's allergies indicates:   Allergen Reactions    Lisinopril Swelling       PROVIDER TRIAGE NOTE  Pt is a 61 yo male presenting for hematuria for the past week.  Pt states urine started getting darker a week ago.  Pt also endorses bilateral lower back pain.  Pt has chronic back pain.  Pt was seen by his PCP on Monday and advised to come to the ED for evaluation.        ORDERS  Labs Reviewed   URINALYSIS, REFLEX TO URINE CULTURE   CBC W/ AUTO DIFFERENTIAL   COMPREHENSIVE METABOLIC PANEL       ED Orders (720h ago, onward)    Start Ordered     Status Ordering Provider    09/23/20 1004 09/23/20 1003  CBC auto differential  STAT  Collect    Ordered MONTSERRAT BARRY    09/23/20 1004 09/23/20 1003  Comprehensive metabolic panel  STAT  Collect    Ordered MONTSERRAT BARRY    09/23/20 1003 09/23/20 1003  Urinalysis, Reflex to Urine Culture Urine, Clean Catch  STAT      Ordered MONTSERRAT BARRY            Virtual Visit Note: The provider triage portion of this emergency department evaluation and documentation was performed via [employer], a HIPAA-compliant telemedicine application, in concert with a tele-presenter in the room. A face to face patient evaluation with one of my colleagues will occur once the patient is placed in an emergency department room.      DISCLAIMER: This note was prepared with [employer] voice recognition transcription software. Garbled syntax, mangled pronouns, and other bizarre constructions may be attributed to  that software system.

## 2020-09-23 NOTE — ED PROVIDER NOTES
Encounter Date: 9/23/2020       History     Chief Complaint   Patient presents with    Back Pain     reports chronic lower back pain. reports normally takes tramadol for pain but ran out two days.     Hematuria     reports hematuria for the past week. denies dysuria. reports takes blood thinners.      59 y/o M, PMHx chronic LBP with sciatica, Mitral valve repair 8/3/20 on ASA per chart, CHF, HTN, presents to ED with complaint of low back pain radiating into lower extremities and blood in urine for over 3 weeks. No recent injury or trauma. Back pain was managed with home tramadol, but he states he ran out two days ago. No urinary or bowl incontinence, numbness, or focal weakness. He reports the blood in urine has progressively worsened and he believes his stool appears darker but no bright red blood. No pain with urination, changes in urine frequency, abd or flank pain. He currently denies dizziness, lightheadedness, nausea, vomiting, fever, chills, chest pain, constipation, diarrhea. No other acute complaints at this time.      The history is provided by the patient and medical records.     Review of patient's allergies indicates:   Allergen Reactions    Lisinopril Swelling     Past Medical History:   Diagnosis Date    CHF (congestive heart failure)     COPD (chronic obstructive pulmonary disease)     Dermatitis     Hypertension      Past Surgical History:   Procedure Laterality Date    CORONARY ANGIOGRAPHY N/A 2/17/2020    Procedure: ANGIOGRAM, CORONARY ARTERY;  Surgeon: Tomy Patterson MD;  Location: [facility] CATH LAB/EP;  Service: Cardiology;  Laterality: N/A;    HAND SURGERY      LEFT HEART CATHETERIZATION N/A 2/17/2020    Procedure: Left heart cath;  Surgeon: Tomy Patterson MD;  Location: [facility] CATH LAB/EP;  Service: Cardiology;  Laterality: N/A;    MANDIBLE FRACTURE SURGERY       History reviewed. No pertinent family history.  Social History     Tobacco Use    Smoking status: Former Smoker      Packs/day: 1.00     Years: 34.00     Pack years: 34.00     Types: Cigarettes     Quit date: 3/24/2020     Years since quittin.5    Smokeless tobacco: Never Used   Substance Use Topics    Alcohol use: Not Currently    Drug use: No     Review of Systems   Constitutional: Negative for activity change, chills and fever.   HENT: Negative for sore throat.    Eyes: Negative for visual disturbance.   Respiratory: Negative for cough, chest tightness and shortness of breath.    Cardiovascular: Negative for chest pain and leg swelling.   Gastrointestinal: Negative for abdominal pain, constipation, diarrhea, nausea and vomiting.   Genitourinary: Positive for hematuria. Negative for difficulty urinating, dysuria, flank pain, frequency, penile pain, penile swelling and scrotal swelling.   Musculoskeletal: Positive for back pain. Negative for gait problem, joint swelling and neck stiffness.   Skin: Negative for wound.   Neurological: Negative for dizziness, syncope, weakness, light-headedness, numbness and headaches.   Hematological: Bruises/bleeds easily.       Physical Exam     Initial Vitals [20 0952]   BP Pulse Resp Temp SpO2   103/67 86 20 99 °F (37.2 °C) 100 %      MAP       --         Physical Exam    Nursing note and vitals reviewed.  Constitutional: Vital signs are normal. He appears well-developed and well-nourished. He is cooperative.  Non-toxic appearance. He does not have a sickly appearance. He does not appear ill. No distress.   HENT:   Head: Normocephalic and atraumatic.   Eyes: Conjunctivae and EOM are normal.   Neck: Normal range of motion. Neck supple.   Cardiovascular: Normal rate, regular rhythm and normal heart sounds.   Midsternal incision well healed.   Pulmonary/Chest: Effort normal and breath sounds normal.   Abdominal: Soft. Normal appearance. There is no abdominal tenderness.   Musculoskeletal: Tenderness present.      Lumbar back: He exhibits tenderness.      Comments: Ambulatory in  ED.  Bilateral lumbar paraspinal tenderness. Pain worsened with forward flexion. No midline tenderness. ROM and sensations intact into BLE   Neurological: He is alert and oriented to person, place, and time. GCS score is 15. GCS eye subscore is 4. GCS verbal subscore is 5. GCS motor subscore is 6.   Skin: Skin is warm and dry.   Psychiatric: He has a normal mood and affect. His speech is normal and behavior is normal. Judgment and thought content normal. Cognition and memory are normal.         ED Course   Procedures  Labs Reviewed   URINALYSIS, REFLEX TO URINE CULTURE - Abnormal; Notable for the following components:       Result Value    Protein, UA 2+ (*)     Bilirubin (UA) 1+ (*)     Occult Blood UA 3+ (*)     Urobilinogen, UA 4.0-6.0 (*)     All other components within normal limits    Narrative:     Specimen Source->Urine   CBC W/ AUTO DIFFERENTIAL - Abnormal; Notable for the following components:    RBC 2.06 (*)     Hemoglobin 6.3 (*)     Hematocrit 20.3 (*)     Mean Corpuscular Volume 99 (*)     Mean Corpuscular Hemoglobin Conc 31.0 (*)     RDW 21.6 (*)     MPV 9.1 (*)     Immature Granulocytes 0.9 (*)     Immature Grans (Abs) 0.06 (*)     Lymph% 15.2 (*)     All other components within normal limits   COMPREHENSIVE METABOLIC PANEL - Abnormal; Notable for the following components:    Potassium 3.2 (*)     Total Bilirubin 3.1 (*)      (*)     All other components within normal limits   URINALYSIS MICROSCOPIC - Abnormal; Notable for the following components:    RBC, UA >100 (*)     WBC, UA 36 (*)     WBC Clumps, UA Occasional (*)     Hyaline Casts, UA 2 (*)     All other components within normal limits    Narrative:     Specimen Source->Urine   LACTIC ACID, PLASMA - Abnormal; Notable for the following components:    Lactate (Lactic Acid) 2.6 (*)     All other components within normal limits   TROPONIN I - Abnormal; Notable for the following components:    Troponin I 0.049 (*)     All other components  within normal limits   B-TYPE NATRIURETIC PEPTIDE - Abnormal; Notable for the following components:    BNP 1,303 (*)     All other components within normal limits   TROPONIN I - Abnormal; Notable for the following components:    Troponin I 0.039 (*)     All other components within normal limits    Narrative:     STAT, if not done in ED, then at 2nd and 6th hour from  initial draw.   CBC WITHOUT DIFFERENTIAL - Abnormal; Notable for the following components:    RBC 2.22 (*)     Hemoglobin 6.6 (*)     Hematocrit 20.4 (*)     RDW 25.1 (*)     All other components within normal limits    Narrative:     STAT, if not done in ED, then at 2nd and 6th hour from  initial draw.   CULTURE, URINE   CULTURE, BLOOD   CULTURE, BLOOD   TROPONIN I   B-TYPE NATRIURETIC PEPTIDE   OCCULT BLOOD X 1, STOOL   APTT   PROTIME-INR   PROTIME-INR   APTT   SARS-COV-2 RNA AMPLIFICATION, QUAL   PROTIME-INR    Narrative:     STAT, if not done in ED, then at 2nd and 6th hour from  initial draw.   LACTIC ACID, PLASMA    Narrative:     STAT, if not done in ED, then at 2nd and 6th hour from  initial draw.   BASIC METABOLIC PANEL    Narrative:     STAT, if not done in ED, then at 2nd and 6th hour from  initial draw.   MAGNESIUM    Narrative:     STAT, if not done in ED, then at 2nd and 6th hour from  initial draw.   TYPE & SCREEN   PREPARE RBC SOFT          Imaging Results          CT Urogram Abd Pelvis W WO (Final result)  Result time 09/23/20 20:31:20    Final result by New Morgan MD (09/23/20 20:31:20)                 Impression:      1. No definite acute findings by CT urogram technique.  Unenhanced images without radiopaque urolithiasis.  Both kidneys are symmetric in nephrographic phase with similar contrast excretion.  2. No definite filling defects identified to account for the patient's reported symptoms.  3. Additional incidental findings without substantial interval change relative to earlier same day unenhanced renal stone protocol  CT, as per report body.      Electronically signed by: New Morgan  Date:    09/23/2020  Time:    20:31             Narrative:    EXAMINATION:  CT UROGRAM ABD PELVIS W WO    CLINICAL HISTORY:  Bacteriuria;    TECHNIQUE:  Low dose axial, sagittal and coronal reformations were obtained from the lung bases to the pubic symphysis before and following the IV administration of 125 mL of Omnipaque 350.  Timing was optimized for nephrogram and excretory renal phases.    COMPARISON:  Flank pain/renal stone protocol CT performed 09/23/2020, 14:20 hours    FINDINGS:  Lower chest: Heart is enlarged.  Prosthetic mitral valve.  Patchy opacity at the left lung base (series 4, image 1).    Liver: Unremarkable.    Gallbladder and bile ducts: Gallbladder appears contracted.  No biliary ductal dilatation.    Pancreas: Unremarkable.    Spleen: Unremarkable.    Adrenals: Unremarkable.    Kidneys: Unenhanced 8 images of the kidneys without significant interval change relative to earlier same day examination.  No definite radiopaque urolithiasis is seen.  There is symmetric appearance of the kidneys on nephrographic phase imaging.  No definite filling defects are noted within the ureters.  A Porter catheter balloon is noted within the urinary bladder.    Lymph nodes: No abdominal or pelvic lymphadenopathy.    Bowel and mesentery: Unremarkable.As before, upper normal caliber fluid-filled loops of small bowel are noted, mostly in the left upper quadrant.    Abdominal aorta: Nonaneurysmal.  Aortoiliac vascular calcifications are noted.    Inferior vena cava: Unremarkable.    Free fluid or free air: None.    Pelvis: Unremarkable.    Body wall: Unremarkable.    Bones: No acute findings.  Degenerative findings in lumbar spine most advanced at the L5-S1 and L4-5 levels.                               X-Ray Chest 1 View (Final result)  Result time 09/23/20 14:42:46    Final result by Ramirez Thorne Jr., MD (09/23/20 14:42:46)                  Impression:      Cardiomegaly without evidence of acute decompensation.      Electronically signed by: Ramirez Garcia Jr  Date:    09/23/2020  Time:    14:42             Narrative:    EXAMINATION:  XR CHEST 1 VIEW    CLINICAL HISTORY:  Other specified abnormal findings of blood chemistry    TECHNIQUE:  Single frontal view of the chest was performed.    COMPARISON:  04/24/2020    FINDINGS:  Cardiac silhouette is enlarged.    Lungs grossly clear within limitations of portable technique    There is mild elevation of the left hemidiaphragm.  Sternal wire sutures are noted.  No convincing evidence of pleural fluid on this portable film.                               CT Renal Stone Study ABD Pelvis WO (Final result)  Result time 09/23/20 14:32:18    Final result by Ramirez Thorne Jr., MD (09/23/20 14:32:18)                 Impression:      Negative for  tract stone    Cardiomegaly, with CT findings suggesting anemia..      Electronically signed by: Ramirez Garcia Jr  Date:    09/23/2020  Time:    14:32             Narrative:    EXAMINATION:  CT RENAL STONE STUDY ABD PELVIS WO    CLINICAL HISTORY:  Hematuria, unknown cause;    TECHNIQUE:  Low dose axial images, sagittal and coronal reformations were obtained from the lung bases to the pubic symphysis.  Contrast was not administered.    COMPARISON:  None    FINDINGS:  Lung Bases: Heart is enlarged.  There is a prosthetic mitral valve partially visualized.  Flowing blood is less dense than the myocardium compatible with anemia.    Kidneys/ Ureters: Unremarkable.    Liver: Normal in size and attenuation, with no focal hepatic lesions.    Gallbladder: No calcified gallstones.    Bile Ducts: No evidence of dilated ducts.    Pancreas: No mass or peripancreatic fat stranding.    Spleen: Unremarkable.    Adrenals: Unremarkable.    Pelvic organs: Unremarkable.    GI Tract/Mesentery: There is formed stools throughout the colon compatible with constipation and  generalized fecal stasis.  There are some upper normal caliber fluid-filled loops of small bowel scattered throughout the abdomen most notably in the left upper quadrant which could indicate nonspecific enteritis.    Retroperitoneum: No significant adenopathy.    Vasculature: Mild aortic atherosclerosis.    Bones: Unremarkable.                                 Medical Decision Making:   Differential Diagnosis:   Anemia, GI bleed,  bleed, UTI/Pyelonephritis, nephrolithiasis, cancer, lumbar strain, sprain, spasm, radiculopathy, neuropathy, sciatica  ED Management:  Patient presents with complaint of worsened chronic lower back pain with sciatica, along with hematuria that has progressively worsened over past 3 weeks. No injury or trauma. Exam reveals lumbar paraspinal tenderness but no abd or flank tenderness.   Urine culture significant for blood and questionable UTI, given IV rocephin. CT has no evidence of  stone. H/H low 6.3/20.3, consent obtained for 1 unit PRBC. Trop 0.049. BNP 1303. Lactic 2.6. K 3.2 given oral K. Hemoccult neg. Recommend patient placed inpatient 2/2 anemia requiring transfusion with urology consult.   Other:   I have discussed this case with another health care provider.       <> Summary of the Discussion: Patient discussed with Dr. George, who agrees with ED course and dispo  [facility] on diversion. Transfer center call. Spoke with Dr. Saleem, Urology. She believes H/H drop is unlikely caused by Hematuria, but she has accepted the patient as a consult after admit to Internal Med.     5:53 PM  [facility] no longer on diversion. Page sent to [facility] internal medicine    6:03 PM  Called returned form [facility] internal Med, who has accepted patient.               Attending Attestation:     Physician Attestation Statement for NP/PA:   I have conducted a face to face encounter with this patient in addition to the NP/PA, due to Medical Complexity    Other NP/PA Attestation Additions:      Medical  Decision Making: Briefly assessed this anemic frail 61 yo man.   Will transfuse, obtain subspecialty consultation and observe for cycling of HH.      Attending Critical Care:   Critical Care Times:   Direct Patient Care (initial evaluation, reassessments, and time considering the case)................................................................15 minutes.   Additional History from reviewing old medical records or taking additional history from the family, EMS, PCP, etc.......................10 minutes.   Ordering, Reviewing, and Interpreting Diagnostic Studies...............................................................................................................5 minutes.   Documentation..................................................................................................................................................................................10 minutes.   Consultation with other Physicians. .................................................................................................................................................10 minutes.   ==============================================================  · Total Critical Care Time - exclusive of procedural time: 50 minutes.  ==============================================================  Critical care was necessary to treat or prevent imminent or life-threatening deterioration of the following conditions: GI bleeding.   The following critical care procedures were done by me (see procedure notes): blood draw for specimens and pulse oximetry.   Critical care was time spent personally by me on the following activities: obtaining history from patient or relative, examination of patient, review of x-rays / CT sent with the patient, review of old charts, ordering lab, x-rays, and/or EKG, development of treatment plan with patient or relative, ordering and performing treatments and interventions, evaluation of patient's response to  treatment, discussions with primary provider, discussion with consultants, interpretation of cardiac measurements and re-evaluation of patient's conition.   Critical Care Condition: potentially life-threatening               ED Course as of Sep 23 2051   Wed Sep 23, 2020   1336 My interpretation, sinus rhythm, 69 beats per minute, mild T-wave flattening in T-wave inversion V3 V4, when compared to previous EKG 04/24/2020 minimal changes    [TK]      ED Course User Index  [TK] Yan George MD            Clinical Impression:       ICD-10-CM ICD-9-CM   1. Anemia requiring transfusions  D64.9 285.9   2. Anemia  D64.9 285.9   3. Elevated brain natriuretic peptide (BNP) level  R79.89 790.99   4. Bilateral low back pain with bilateral sciatica, unspecified chronicity  M54.42 724.3    M54.41 724.2   5. Hematuria, unspecified type  R31.9 599.70                          ED Disposition Condition    Observation                             Levon Deng PA-C  09/23/20 2042       Yan George MD  09/23/20 2051

## 2020-09-23 NOTE — ED NOTES
"Pt eating dinner at this time. Pt states "I ain't going to no other [facility] facility. I live right fucking here and I ain't going. I told that [facility] doctor that." Informed Levon WRIGHT that pt does not want to go to any other [facility] facility. Levon in to speak to pt.   "

## 2020-09-23 NOTE — ED NOTES
"Pt is very rude and stating "If y'all don't get me some fucking food, I'm gonna yank this IV out and leave this motherfucker. Tghat food you gave me didn't do anything. I want some fucking food. I don't care what you say." Explained to pt that I needed to speak to the MD about his diet order.   "

## 2020-09-24 PROBLEM — D62 ACUTE ON CHRONIC BLOOD LOSS ANEMIA: Status: ACTIVE | Noted: 2020-01-01

## 2020-09-24 PROBLEM — R31.0 GROSS HEMATURIA: Status: ACTIVE | Noted: 2020-01-01

## 2020-09-24 NOTE — NURSING
Informed pt of NPO status after MN. Pt AAOx4. Voice complete understanding, stated he will comply.

## 2020-09-24 NOTE — PROGRESS NOTES
"[facility] Medical Student Progress Note    Subjective:      Mr. David slept well overnight and feels well this morning. He has not had a bowel movement since admission.  He denies any overnight SOB,CP, fever, chills, nausea, or vomiting. No dizziness, blurred vision    His diaz remains in place and his urine remains dark red.     Objective:   24 Hour Events:  NPO overnight     Last 24 Hour Vital Signs:  BP  Min: 103/67  Max: 141/66  Temp  Av.6 °F (37 °C)  Min: 97.6 °F (36.4 °C)  Max: 99.3 °F (37.4 °C)  Pulse  Av.9  Min: 66  Max: 86  Resp  Av.5  Min: 16  Max: 24  SpO2  Av.4 %  Min: 98 %  Max: 100 %  Height  Av' 1.5" (186.7 cm)  Min: 6' 1.5" (186.7 cm)  Max: 6' 1.5" (186.7 cm)  Weight  Av.1 kg (136 lb 15.1 oz)  Min: 61.2 kg (135 lb)  Max: 63 kg (138 lb 14.2 oz)  I/O last 3 completed shifts:  In: 1448 [P.O.:548; Blood:350; IV Piggyback:550]  Out: 2150 [Urine:2150]    Physical Examination:  Constitutional: He is oriented to person, place, and time. He does not appear ill. No distress.   HENT:   Head: Normocephalic and atraumatic.   Right Ear: External ear normal.   Left Ear: External ear normal.   Nose: No rhinorrhea or nasal congestion.   Mouth/Throat: Mucous membranes are moist. No oropharyngeal exudate. Sublingual jaundice present   Eyes: Scleral icterus present.   Cardiovascular: Normal rate, regular rhythm and normal pulses. Exam reveals no gallop.   No murmur heard.  Pulmonary/Chest: Effort normal and breath sounds normal. No stridor. No respiratory distress. He has no wheezes.   Abdominal: Soft. Normal appearance and bowel sounds are normal. He exhibits no distension.   Musculoskeletal:         General: No swelling or tenderness.   Neurological: He is alert and oriented to person, place, and time. He displays no weakness.   Skin: Skin is warm and dry. No rash noted. No erythema    Laboratory:  Laboratory Data Reviewed: yes  Pertinent Findings:   Trended Lab Data:  Recent Labs   Lab " "09/23/20  1015 09/23/20  1844 09/24/20  0213   WBC 6.53 5.81 5.18   HGB 6.3* 6.6* 7.0*   HCT 20.3* 20.4* 21.8*    271 285   MCV 99* 92 90   RDW 21.6* 25.1* 26.6*    137 138   K 3.2* 4.3 3.7   CL 97 98 99   CO2 28 28 28   BUN 13 15 17   CREATININE 1.2 1.3 1.2   GLU 98 88 99   PROT 8.2  --  7.4   ALBUMIN 3.8  --  3.4*   BILITOT 3.1*  --  2.5*   *  --  104*   ALKPHOS 74  --  66   ALT 15  --  14       Trended Cardiac Data:  Recent Labs   Lab 09/23/20  1015 09/23/20  1844 09/24/20  0213   TROPONINI 0.049* 0.039* 0.051*   BNP 1,303*  --   --        Microbiology Data Reviewed: yes  Pertinent Findings:  9/23 Urine Culture: No Result  9/23 Blood Culture x2: NGTD    Other Results:  EKG (my interpretation):Sinus Rhythm, unchanged from previous    Radiology Data Reviewed: yes  Pertinent Findings:  9/23 CT Renal stone: no acute processes  9/23 CT Urogram: no definite acute findings    Current Medications:     Infusions:       Scheduled:   cefTRIAXone (ROCEPHIN) IVPB  1 g Intravenous Q24H    levothyroxine  50 mcg Oral Before breakfast    metoprolol succinate  12.5 mg Oral Daily    polyethylene glycol  17 g Oral Daily    senna  8.6 mg Oral Daily        PRN:  sodium chloride, albuterol-ipratropium, hepatitis A virus vaccine (PF), influenza, sodium chloride 0.9%  /  Antibiotics and Day Number of Therapy:  Ceftriaxone 1g, Day 1    Assessment/Plan:     Anirudh David is a 60 y.o.male with    Hematuria  -2.5 week history of painless gross hematuria. After MVR on ASA (not taking)  -likely preceded by microhematuria as CBC shows decrease in H/H  in start of august prior to onset of gross hematuria. 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 "chemicals", Smoking Hx 1 pack a day for 34 years concerning for malignancy > glomerulonephritis in the setting of untreated Hep C > Urinary tract infection  -ED U/A >100 RBCs, 36 WBC, 2 hyaline casts, 2+ protein, 4-6 urobilogen  -Cr 1.2 on admit  -CT Renal " stone unremarkable  -CT urogram unremarkable  -idaz placed, NPO after midnight for possible urological procedure in AM  -9/23 diaz remains in place with dark red urine  -Consulted Urology, awaiting recs     Acute on chronic Blood Loss Anemia, likely 2/2 to hematuria   -ED H/H 6.3/20.3 repeat 6.6/20.4, received 1 unit RBC  -Likely chronic, 8/3 admission 10.1/30.1  -No iron studies ordered due to receiving unit RBC  -9/24 H/H 7/21.8  -Will continue to monitor     Intermediate Troponin Elevation   -admit troponin 0.049--> 0.039-->0.051  -continue to trend Q6  -consulted [employer] cards, awaiting recs     Chronic Combined HF (40-45%)  -8/31 TTE EF 40-45%, mild to moderate mitral regurgitation  -Mitral valve repair on 8/3/20  -admit BNP 1303, pt euvolemic on exam  -chest x ray without acute changes  -holding home metroprolol tartrate and bumetanide due to euvolemic status     Hypokalemia  -K 3.2 at admit, asymptomatic  -repleted with 40 Alverto KCL   -9/24 K 3.7  -Continue to monitor     chronic LBP with sciatica  -No urinary bowel incontinence, No numbness and weakness  -on tramadol and norco at home, ran out the prescriptions 2 days ago     Untreated Hep C  -elevated  at admit  -administer hep A vaccine     Hypothyroidism  -continue home levothyroxine 50mcg     severe mirtal regurgitation  -Mitral valve repair 8/3/20     Constipation  -large stool burden noted on CT, pt states last bowel movement was yesterday  -senna 8.6 mg QD and Miralax 17g QD ordered  -No BM since admission. No complaints     Reported COPD  -administered albuterol nebulizer on previous admissions  -not formally diagnosed  -placed on PRN nebulizer      Code: Full  DVT ppx:  Diet: NPO    Dispo: pending resolution of hematuria    Matilda Veliz MD  [facility] - I    [facility] Medicine Hospitalist Pager numbers:   [facility] Hospitalist Medicine Team A (Mony/Stefan): [phone]  [facility] Hospitalist Medicine Team B (Ni/Gladis):  [phone]

## 2020-09-24 NOTE — MEDICAL/APP STUDENT
[facility] Medicine H&P Note     Admitting Team: [facility] Hospitalist Team A  Attending Physician: Dennise Aviles MD  Resident: Ulisses  Intern: Keren     Date of Admit: 9/23/2020    Chief Complaint     Blood in urine x 2.5 weeks    Subjective:      History of Present Illness:  Mr. Anirudh David is a 60 yo male with a past medical history of COPD and dermatitis with a current medical history of CHF, HepC, and hypertension.  The patient presented to [facility] ED on 9/23/20 with a chief complaint of blood in his urine for 2.5 weeks.  Of note, the patient recently underwent mitral valve replacement for mitral insufficiency at [facility] on 8/3/20.    Mr. Landon first noted darkening of his urine 2.5 weeks ago and his urine has remained red/dark since then.  He notes that's his urine is darkest in the mornings and denies any pain during urination.  His urine is dark throughout the entirety of voiding.  He has not noticed any clots, cloudiness, or sediment in his urine. He has not experienced any penile pain, discharge, itching, rash, or tenderness.  He reports a recent weight loss of 20 lbs over the last 3 months but denies fever or chills.  Pt presented to a follow up PCP appointment on Monday (9/20) where he was told to go to the ED for his hematuria.      Past Medical History:  Past Medical History:   Diagnosis Date    CHF (congestive heart failure)     COPD (chronic obstructive pulmonary disease)     Dermatitis     Hypertension        Past Surgical History:  Past Surgical History:   Procedure Laterality Date    CORONARY ANGIOGRAPHY N/A 2/17/2020    Procedure: ANGIOGRAM, CORONARY ARTERY;  Surgeon: Tomy Patterson MD;  Location: [facility] CATH LAB/EP;  Service: Cardiology;  Laterality: N/A;    HAND SURGERY      LEFT HEART CATHETERIZATION N/A 2/17/2020    Procedure: Left heart cath;  Surgeon: Tomy Patterson MD;  Location: [facility] CATH LAB/EP;  Service: Cardiology;  Laterality: N/A;    MANDIBLE  FRACTURE SURGERY         Allergies:  Review of patient's allergies indicates:   Allergen Reactions    Lisinopril Swelling       Home Medications:  Prior to Admission medications    Medication Sig Start Date End Date Taking? Authorizing Provider   Tramadol 50 mg 1 Tablet Twice a Day       Bumetanide 1mg 1 Tablet Twice a Day       HYDROcodone-acetaminophen (NORCO) 5-325 mg per tablet Take 1 tablet by mouth every 4 (four) hours as needed for Pain. 20   ADRIANA Valdez   metoprolol Tartrate 25 mg Take 1/2 tablet by mouth twice daily.       Levothyroxine .05 mg Take 1 tablet once daily                           Family History:  History reviewed. No pertinent family history.    Social History:  Social History     Tobacco Use    Smoking status: Former Smoker     Packs/day: 1.00     Years: 34.00     Pack years: 34.00     Types: Cigarettes     Quit date: 3/24/2020     Years since quittin.5    Smokeless tobacco: Never Used   Substance Use Topics    Alcohol use: Not Currently    Drug use: No       Review of Systems:  A comprehensive review of systems was negative. All other systems are reviewed and are negative.    Health Maintaince :   Primary Care Physician: Dr. Khushboo Reed    Immunizations:   Flu UTD  Pna NUTD      Objective:   Last 24 Hour Vital Signs:  BP  Min: 103/67  Max: 141/66  Temp  Av.6 °F (37 °C)  Min: 98 °F (36.7 °C)  Max: 99 °F (37.2 °C)  Pulse  Av.3  Min: 66  Max: 86  Resp  Av.7  Min: 18  Max: 24  SpO2  Av.6 %  Min: 99 %  Max: 100 %  Weight  Av.2 kg (135 lb)  Min: 61.2 kg (135 lb)  Max: 61.2 kg (135 lb)  Body mass index is 17.33 kg/m².  I/O last 3 completed shifts:  In: 50 [IV Piggyback:50]  Out: -     Physical Examination:  Constitutional: He is oriented to person, place, and time. He does not appear ill. No distress.   HENT:   Head: Normocephalic and atraumatic.   Right Ear: External ear normal.   Left Ear: External ear normal.   Nose: No rhinorrhea or nasal  congestion.   Mouth/Throat: Mucous membranes are moist. No oropharyngeal exudate. Sublingual jaundice present   Eyes: Scleral icterus present.   Cardiovascular: Normal rate, regular rhythm and normal pulses. Exam reveals no gallop.   No murmur heard.  Pulmonary/Chest: Effort normal and breath sounds normal. No stridor. No respiratory distress. He has no wheezes.   Abdominal: Soft. Normal appearance and bowel sounds are normal. He exhibits no distension.   Musculoskeletal:         General: No swelling or tenderness.   Neurological: He is alert and oriented to person, place, and time. He displays no weakness.   Skin: Skin is warm and dry. No rash noted. No erythema.   Psychiatric: His behavior is normal. Mood normal.      Laboratory:  Most Recent Data:  CBC:   Lab Results   Component Value Date    WBC 5.81 09/23/2020    HGB 6.6 (L) 09/23/2020    HCT 20.4 (L) 09/23/2020     09/23/2020    MCV 92 09/23/2020    RDW 25.1 (H) 09/23/2020     WBC Differential: 68.8 % Bands, 1.0 % L, 0.8% M, 0.1 % Eo, 0.8 % Baso,   BMP:   Lab Results   Component Value Date     09/23/2020    K 4.3 09/23/2020    CL 98 09/23/2020    CO2 28 09/23/2020    BUN 15 09/23/2020    CREATININE 1.3 09/23/2020    GLU 88 09/23/2020    CALCIUM 9.1 09/23/2020    MG 1.8 09/23/2020    PHOS 3.6 04/26/2020     LFTs:   Lab Results   Component Value Date    PROT 8.2 09/23/2020    ALBUMIN 3.8 09/23/2020    BILITOT 3.1 (H) 09/23/2020     (H) 09/23/2020    ALKPHOS 74 09/23/2020    ALT 15 09/23/2020     Coags:   Lab Results   Component Value Date    INR 1.1 09/23/2020     FLP: No results found for: CHOL, HDL, LDLCALC, TRIG, CHOLHDL  DM:   Lab Results   Component Value Date    CREATININE 1.3 09/23/2020     Thyroid: No results found for: TSH, FREET4, A0HCHSL, L3GOPHF, THYROIDAB  Anemia:   Lab Results   Component Value Date    FERRITIN 80 04/09/2020     Cardiac:   Lab Results   Component Value Date    TROPONINI 0.049 (H) 09/23/2020    BNP 1,303 (H)  09/23/2020     Urinalysis:   Lab Results   Component Value Date    DANIELLE RYAN 09/23/2020    SPECGRAV 1.010 09/23/2020    NITRITE Negative 09/23/2020    KETONESU Negative 09/23/2020    UROBILINOGEN 4.0-6.0 (A) 09/23/2020    WBCUA 36 (H) 09/23/2020       Trended Lab Data:  Recent Labs   Lab 09/23/20  1015 09/23/20  1844   WBC 6.53 5.81   HGB 6.3* 6.6*   HCT 20.3* 20.4*    271   MCV 99* 92   RDW 21.6* 25.1*    137   K 3.2* 4.3   CL 97 98   CO2 28 28   BUN 13 15   CREATININE 1.2 1.3   GLU 98 88   PROT 8.2  --    ALBUMIN 3.8  --    BILITOT 3.1*  --    *  --    ALKPHOS 74  --    ALT 15  --        Trended Cardiac Data:  Recent Labs   Lab 09/23/20  1015   TROPONINI 0.049*   BNP 1,303*       Microbiology Data:  9/23 Urine Culture: Pending  9/23 Blood Culture x2: Pending    Other Results:  EKG (my interpretation): Sinus Rhythm, unchanged from previous    Radiology:  Imaging Results          X-Ray Chest 1 View (Final result)  Result time 09/23/20 14:42:46    Final result by Ramirez Thorne Jr., MD (09/23/20 14:42:46)                 Impression:      Cardiomegaly without evidence of acute decompensation.      Electronically signed by: Ramirez Garcia Jr  Date:    09/23/2020  Time:    14:42             Narrative:    EXAMINATION:  XR CHEST 1 VIEW    CLINICAL HISTORY:  Other specified abnormal findings of blood chemistry    TECHNIQUE:  Single frontal view of the chest was performed.    COMPARISON:  04/24/2020    FINDINGS:  Cardiac silhouette is enlarged.    Lungs grossly clear within limitations of portable technique    There is mild elevation of the left hemidiaphragm.  Sternal wire sutures are noted.  No convincing evidence of pleural fluid on this portable film.                               CT Renal Stone Study ABD Pelvis WO (Final result)  Result time 09/23/20 14:32:18    Final result by Ramirez Thorne Jr., MD (09/23/20 14:32:18)                 Impression:      Negative for  tract  stone    Cardiomegaly, with CT findings suggesting anemia..      Electronically signed by: Ramierz Garcia Jr  Date:    09/23/2020  Time:    14:32             Narrative:    EXAMINATION:  CT RENAL STONE STUDY ABD PELVIS WO    CLINICAL HISTORY:  Hematuria, unknown cause;    TECHNIQUE:  Low dose axial images, sagittal and coronal reformations were obtained from the lung bases to the pubic symphysis.  Contrast was not administered.    COMPARISON:  None    FINDINGS:  Lung Bases: Heart is enlarged.  There is a prosthetic mitral valve partially visualized.  Flowing blood is less dense than the myocardium compatible with anemia.    Kidneys/ Ureters: Unremarkable.    Liver: Normal in size and attenuation, with no focal hepatic lesions.    Gallbladder: No calcified gallstones.    Bile Ducts: No evidence of dilated ducts.    Pancreas: No mass or peripancreatic fat stranding.    Spleen: Unremarkable.    Adrenals: Unremarkable.    Pelvic organs: Unremarkable.    GI Tract/Mesentery: There is formed stools throughout the colon compatible with constipation and generalized fecal stasis.  There are some upper normal caliber fluid-filled loops of small bowel scattered throughout the abdomen most notably in the left upper quadrant which could indicate nonspecific enteritis.    Retroperitoneum: No significant adenopathy.    Vasculature: Mild aortic atherosclerosis.    Bones: Unremarkable.                                   Assessment:   Anirudh David is a 61 yo male with CHF, COPD and HepC presenting with gross hematuria, intermediate troponin elevation, and hypokalemia.       Plan:     Hematuria  -2.5 week history of painless gross hematuria  -likely preceded by microhematuria due to already established decrease in H/H (8/3) prior to onset of gross hematuria  -ED U/A >100 RBCs, 36 WBC, 2 hyaline casts, 2+ protein, 4-6 urobilogen  -Cr 1.2  -CT Renal stone unremarkable  -CT uregram unremarkable  -diaz placed, NPO after midnight for  possible urological procedure in AM  -Consulted Urology, awaiting recs    Chronic Blood Loss Anemia, likely 2/2 to hematuria  -ED H/H 6.3/20.3 repeat 6.6/20.4, receiving 1 unit RBC  -Likely chronic, 8/3 admission 10.1/30.1  -No iron studies ordered due to receiving RBC unit    Intermediate Troponin Elevation  -admit troponin 0.049  -continue to trend Q6    Chronic Combined HF (40-45%)  -8/31 TTE EF 40-45%, mild to moderate mitral regurg  -Mitral valve repair on 8/3/20  -admit BNP 1303, pt euvolemic on exam  -holding home metroprolol tartrate and bumetanide due to euvolemic status    Untreated Hep C  -elevated  at admit  -administer hep A vaccine    Hypothyroidism  -continue home levothyroxine 50mcg    Reported COPD  -administered albuterol nebulizer on previous admissions  -not formally diagnosed  -placed on PRN nebulizer    Hypokalemia  -K 3.2 at admit, asymptomatic  -replete with 40 Alverto KCL and continue to monitor    Constipation  -large stool burden noted on CT, pt states last bowel movement was yesterday  -senna 8.6 mg ordered      Code Status: Full       Dispo; pending resolution of hematuria       Willam Jim  [facility] Internal Medicine HO-    [facility] Medicine Hospitalist Pager numbers:   [facility] Hospitalist Medicine Team A (Mony/Stefan): [phone]  [facility] Hospitalist Medicine Team B (Ni/Gladis):  [phone]

## 2020-09-24 NOTE — NURSING
Dr Darden notified of troponin result 0.051. no orders given. Stated ok. Pt denies any complaints.

## 2020-09-24 NOTE — H&P
[facility] Medicine H&P Note     Admitting Team: [facility] Hospitalist Team A  Attending Physician: Dennise Aviles MD  Resident: Ulisses  Intern: Keren     Date of Admit: 9/23/2020    Chief Complaint     Blood in urine x 2.5 weeks    Subjective:      History of Present Illness:  Mr. Anirudh David is a 60 yo male with a past medical history of hypothyroidism, chronic LBP with sciatica, combined HF(EF 40-45% on 8/31), COPD and HepC and hypertension. The patient presented to [facility] ED on 9/23/20 with a chief complaint of blood in his urine for 2.5 weeks. Of note, the patient recently underwent mitral valve replacement for mitral insufficiency at [facility] on 8/3/20.    Mr. Landon first noted darkening of his urine 2.5 weeks ago and his urine has remained red/dark since then.  He notes that's his urine is darkest in the mornings and denies any pain during urination.  His urine is dark throughout the entirety of voiding.  He has not noticed any clots, cloudiness, or sediment in his urine. He has not experienced any penile pain, discharge, itching, rash, or tenderness.  He reports a recent weight loss of 20 lbs over the last 3 months but denies fever or chills. Pt reports dizziness for past several weeks. Denies CP, SOB, N/V/C/D, abdominal pain, flank pain, blurred vision. Denies recent trauma.    Pt presented to a follow up PCP appointment on Monday (9/20) where he was told to go to the ED for his hematuria.       Past Medical History:  Past Medical History:   Diagnosis Date    CHF (congestive heart failure)     COPD (chronic obstructive pulmonary disease)     Dermatitis     Hypertension        Past Surgical History:  Past Surgical History:   Procedure Laterality Date    CORONARY ANGIOGRAPHY N/A 2/17/2020    Procedure: ANGIOGRAM, CORONARY ARTERY;  Surgeon: Tomy Patterson MD;  Location: [facility] CATH LAB/EP;  Service: Cardiology;  Laterality: N/A;    HAND SURGERY      LEFT HEART CATHETERIZATION  "N/A 2020    Procedure: Left heart cath;  Surgeon: Tomy Patterson MD;  Location: [facility] CATH LAB/EP;  Service: Cardiology;  Laterality: N/A;    MANDIBLE FRACTURE SURGERY         Allergies:  Review of patient's allergies indicates:   Allergen Reactions    Lisinopril Swelling       Home Medications:  Prior to Admission medications    Medication Sig Start Date End Date Taking? Authorizing Provider   Tramadol 50 mg 1 Tablet Twice a Day       Bumetanide 1mg 1 Tablet Twice a Day       HYDROcodone-acetaminophen (NORCO) 5-325 mg per tablet Take 1 tablet by mouth every 4 (four) hours as needed for Pain. 20   ADRIANA Valdez   metoprolol Tartrate 25 mg Take 1/2 tablet by mouth twice daily.       Levothyroxine .05 mg Take 1 tablet once daily                           Family History:  History reviewed. No pertinent family history.    Social History:  Social History     Tobacco Use    Smoking status: Former Smoker     Packs/day: 1.00     Years: 34.00     Pack years: 34.00     Types: Cigarettes     Quit date: 3/24/2020     Years since quittin.5    Smokeless tobacco: Never Used   Substance Use Topics    Alcohol use: Not Currently    Drug use: No     Worked at the [employer] in the past for 15-20 years. Had exposure to "lots of chemicals" there    Review of Systems:  A comprehensive review of systems was negative. All other systems are reviewed and are negative.    Health Maintaince :   Primary Care Physician: Dr. Khushboo Reed    Immunizations:   Flu UTD  Pna NUTD  TDaP NUTD    Colonoscopy Not UTD    Objective:   Last 24 Hour Vital Signs:  BP  Min: 103/67  Max: 141/66  Temp  Av.6 °F (37 °C)  Min: 98 °F (36.7 °C)  Max: 99 °F (37.2 °C)  Pulse  Av.3  Min: 66  Max: 86  Resp  Av.5  Min: 18  Max: 24  SpO2  Av.7 %  Min: 99 %  Max: 100 %  Weight  Av.1 kg (136 lb 15.1 oz)  Min: 61.2 kg (135 lb)  Max: 63 kg (138 lb 14.2 oz)  Body mass index is 17.83 kg/m².  I/O last 3 completed shifts:  In: " 50 [IV Piggyback:50]  Out: -     Physical Examination:  Constitutional: He is oriented to person, place, and time. He does not appear ill. No distress.   HENT:   Head: Normocephalic and atraumatic.   Right Ear: External ear normal.   Left Ear: External ear normal.   Nose: No rhinorrhea or nasal congestion.   Mouth/Throat: Mucous membranes are moist. No oropharyngeal exudate. Sublingual jaundice present   Eyes: Scleral icterus present. Normal conjunctiva, without palor  Cardiovascular: Normal rate, regular rhythm and normal pulses. Exam reveals no gallop.   No murmur heard.  Pulmonary/Chest: Effort normal and breath sounds normal. No stridor. No respiratory distress. He has no wheezes.   Abdominal: Soft. Normal appearance and bowel sounds are normal. He exhibits no distension.   Musculoskeletal:         General: No swelling or tenderness.   Neurological: He is alert and oriented to person, place, and time. He displays no weakness.   Skin: Skin is warm and dry. No rash noted. No erythema.   Psychiatric: His behavior is normal. Mood normal.      Laboratory:  Most Recent Data:  CBC:   Lab Results   Component Value Date    WBC 5.81 09/23/2020    HGB 6.6 (L) 09/23/2020    HCT 20.4 (L) 09/23/2020     09/23/2020    MCV 92 09/23/2020    RDW 25.1 (H) 09/23/2020     WBC Differential: 68.8 % Bands, 1.0 % L, 0.8% M, 0.1 % Eo, 0.8 % Baso,   BMP:   Lab Results   Component Value Date     09/23/2020    K 4.3 09/23/2020    CL 98 09/23/2020    CO2 28 09/23/2020    BUN 15 09/23/2020    CREATININE 1.3 09/23/2020    GLU 88 09/23/2020    CALCIUM 9.1 09/23/2020    MG 1.8 09/23/2020    PHOS 3.6 04/26/2020     LFTs:   Lab Results   Component Value Date    PROT 8.2 09/23/2020    ALBUMIN 3.8 09/23/2020    BILITOT 3.1 (H) 09/23/2020     (H) 09/23/2020    ALKPHOS 74 09/23/2020    ALT 15 09/23/2020     Coags:   Lab Results   Component Value Date    INR 1.1 09/23/2020     FLP: No results found for: CHOL, HDL, LDLCALC, TRIG,  CHOLHDL  DM:   Lab Results   Component Value Date    CREATININE 1.3 09/23/2020     Thyroid: No results found for: TSH, FREET4, S1DFSXO, M0UHZHX, THYROIDAB  Anemia:   Lab Results   Component Value Date    FERRITIN 80 04/09/2020     Cardiac:   Lab Results   Component Value Date    TROPONINI 0.039 (H) 09/23/2020    BNP 1,303 (H) 09/23/2020     Urinalysis:   Lab Results   Component Value Date    COLORU DK. BROWN 09/23/2020    SPECGRAV 1.010 09/23/2020    NITRITE Negative 09/23/2020    KETONESU Negative 09/23/2020    UROBILINOGEN 4.0-6.0 (A) 09/23/2020    WBCUA 36 (H) 09/23/2020       Trended Lab Data:  Recent Labs   Lab 09/23/20  1015 09/23/20  1844   WBC 6.53 5.81   HGB 6.3* 6.6*   HCT 20.3* 20.4*    271   MCV 99* 92   RDW 21.6* 25.1*    137   K 3.2* 4.3   CL 97 98   CO2 28 28   BUN 13 15   CREATININE 1.2 1.3   GLU 98 88   PROT 8.2  --    ALBUMIN 3.8  --    BILITOT 3.1*  --    *  --    ALKPHOS 74  --    ALT 15  --        Trended Cardiac Data:  Recent Labs   Lab 09/23/20  1015 09/23/20  1844   TROPONINI 0.049* 0.039*   BNP 1,303*  --        Microbiology Data:  9/23 Urine Culture: Pending  9/23 Blood Culture x2: Pending    Other Results:  EKG (my interpretation): Sinus Rhythm, unchanged from previous    Radiology:  Imaging Results          CT Urogram Abd Pelvis W WO (Final result)  Result time 09/23/20 20:31:20    Final result by New Morgan MD (09/23/20 20:31:20)                 Impression:      1. No definite acute findings by CT urogram technique.  Unenhanced images without radiopaque urolithiasis.  Both kidneys are symmetric in nephrographic phase with similar contrast excretion.  2. No definite filling defects identified to account for the patient's reported symptoms.  3. Additional incidental findings without substantial interval change relative to earlier same day unenhanced renal stone protocol CT, as per report body.      Electronically signed by: New  Eddie  Date:    09/23/2020  Time:    20:31             Narrative:    EXAMINATION:  CT UROGRAM ABD PELVIS W WO    CLINICAL HISTORY:  Bacteriuria;    TECHNIQUE:  Low dose axial, sagittal and coronal reformations were obtained from the lung bases to the pubic symphysis before and following the IV administration of 125 mL of Omnipaque 350.  Timing was optimized for nephrogram and excretory renal phases.    COMPARISON:  Flank pain/renal stone protocol CT performed 09/23/2020, 14:20 hours    FINDINGS:  Lower chest: Heart is enlarged.  Prosthetic mitral valve.  Patchy opacity at the left lung base (series 4, image 1).    Liver: Unremarkable.    Gallbladder and bile ducts: Gallbladder appears contracted.  No biliary ductal dilatation.    Pancreas: Unremarkable.    Spleen: Unremarkable.    Adrenals: Unremarkable.    Kidneys: Unenhanced 8 images of the kidneys without significant interval change relative to earlier same day examination.  No definite radiopaque urolithiasis is seen.  There is symmetric appearance of the kidneys on nephrographic phase imaging.  No definite filling defects are noted within the ureters.  A Porter catheter balloon is noted within the urinary bladder.    Lymph nodes: No abdominal or pelvic lymphadenopathy.    Bowel and mesentery: Unremarkable.As before, upper normal caliber fluid-filled loops of small bowel are noted, mostly in the left upper quadrant.    Abdominal aorta: Nonaneurysmal.  Aortoiliac vascular calcifications are noted.    Inferior vena cava: Unremarkable.    Free fluid or free air: None.    Pelvis: Unremarkable.    Body wall: Unremarkable.    Bones: No acute findings.  Degenerative findings in lumbar spine most advanced at the L5-S1 and L4-5 levels.                               X-Ray Chest 1 View (Final result)  Result time 09/23/20 14:42:46    Final result by Ramirez Thorne Jr., MD (09/23/20 14:42:46)                 Impression:      Cardiomegaly without evidence of acute  decompensation.      Electronically signed by: Ramirez Garcia Jr  Date:    09/23/2020  Time:    14:42             Narrative:    EXAMINATION:  XR CHEST 1 VIEW    CLINICAL HISTORY:  Other specified abnormal findings of blood chemistry    TECHNIQUE:  Single frontal view of the chest was performed.    COMPARISON:  04/24/2020    FINDINGS:  Cardiac silhouette is enlarged.    Lungs grossly clear within limitations of portable technique    There is mild elevation of the left hemidiaphragm.  Sternal wire sutures are noted.  No convincing evidence of pleural fluid on this portable film.                               CT Renal Stone Study ABD Pelvis WO (Final result)  Result time 09/23/20 14:32:18    Final result by Ramirez Thorne Jr., MD (09/23/20 14:32:18)                 Impression:      Negative for  tract stone    Cardiomegaly, with CT findings suggesting anemia..      Electronically signed by: Ramirez Garcia Jr  Date:    09/23/2020  Time:    14:32             Narrative:    EXAMINATION:  CT RENAL STONE STUDY ABD PELVIS WO    CLINICAL HISTORY:  Hematuria, unknown cause;    TECHNIQUE:  Low dose axial images, sagittal and coronal reformations were obtained from the lung bases to the pubic symphysis.  Contrast was not administered.    COMPARISON:  None    FINDINGS:  Lung Bases: Heart is enlarged.  There is a prosthetic mitral valve partially visualized.  Flowing blood is less dense than the myocardium compatible with anemia.    Kidneys/ Ureters: Unremarkable.    Liver: Normal in size and attenuation, with no focal hepatic lesions.    Gallbladder: No calcified gallstones.    Bile Ducts: No evidence of dilated ducts.    Pancreas: No mass or peripancreatic fat stranding.    Spleen: Unremarkable.    Adrenals: Unremarkable.    Pelvic organs: Unremarkable.    GI Tract/Mesentery: There is formed stools throughout the colon compatible with constipation and generalized fecal stasis.  There are some upper normal caliber  "fluid-filled loops of small bowel scattered throughout the abdomen most notably in the left upper quadrant which could indicate nonspecific enteritis.    Retroperitoneum: No significant adenopathy.    Vasculature: Mild aortic atherosclerosis.    Bones: Unremarkable.                                   Assessment:   Anirudh David is a 59 yo male with hypothyroidism, chronic LBP with sciatica, combined HF(EF 40-45% on 8/31),Mitral valve repair 8/3/20, COPD and HepC presenting with gross hematuria, intermediate troponin elevation, and hypokalemia.       Plan:     Hematuria  -2.5 week history of painless gross hematuria. After MVR on ASA (not taking)  -likely preceded by microhematuria as CBC shows decrease in H/H  in start of august prior to onset of gross hematuria. 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 "chemicals", Smoking Hx 1 pack a day for 34 years concerning for malignancy > glomerulonephritis in the setting of untreated Hep C > Urinary tract infection  -ED U/A >100 RBCs, 36 WBC, 2 hyaline casts, 2+ protein, 4-6 urobilogen  -Cr 1.2  -CT Renal stone unremarkable  -CT urogram unremarkable  -diaz placed, NPO after midnight for possible urological procedure in AM  -Consulted Urology, awaiting recs    Acute on chronic Blood Loss Anemia, likely 2/2 to hematuria  -ED H/H 6.3/20.3 repeat 6.6/20.4, receiving 1 unit RBC  -Likely chronic, 8/3 admission 10.1/30.1  -No iron studies ordered due to receiving RBC unit    Intermediate Troponin Elevation (trending down)  -admit troponin 0.049--> 0.039  -continue to trend Q6    Chronic Combined HF (40-45%)  -8/31 TTE EF 40-45%, mild to moderate mitral regurgitation  -Mitral valve repair on 8/3/20  -admit BNP 1303, pt euvolemic on exam  -chest x ray without acute changes  -holding home metroprolol tartrate and bumetanide due to euvolemic status    Hypokalemia  -K 3.2 at admit, asymptomatic  -replete with 40 Alverto KCL and continue to monitor    chronic LBP with " sciatica  -No urinary bowel incontinence, No numbness and weakness  -on tramadol at home, ran out the prescriptions 2 days ago    Untreated Hep C  -elevated  at admit  -administer hep A vaccine    Hypothyroidism  -continue home levothyroxine 50mcg    severe mirtal regurgitation  -Mitral valve repair 8/3/20    Constipation  -large stool burden noted on CT, pt states last bowel movement was yesterday  -senna 8.6 mg QD and Miralax 17g QD ordered    Reported COPD  -administered albuterol nebulizer on previous admissions  -not formally diagnosed  -placed on PRN nebulizer      Code Status: Full     Diet: Cardiac. NPO after Midnight  PPx: SCDs    Dispo: pending resolution of hematuria       Matilda Veliz MD  [facility] Internal Medicine HO-I    [facility] Medicine Hospitalist Pager numbers:   [facility] Hospitalist Medicine Team A (Mony/Stefan): [phone]  [facility] Hospitalist Medicine Team B (Ni/Gladis):  [phone]

## 2020-09-24 NOTE — PLAN OF CARE
Patient AAOx3  Lives at home with sister who helps him at home  Uses [facility], no home health    PCP is Dr. Khushboo Reed with [facility] on [address]  Cardiologist is Dr. Gaytan at [facility] (has appt today at 2:30pm)- will reschedule for patient per his request.    This  put name on white board and explained blue discharge folder to patient. Discharge planning brochure and/or business card given to patient.  Patient verbalized understanding.       09/24/20 1049   Discharge Assessment   Assessment Type Discharge Planning Assessment   Confirmed/corrected address and phone number on facesheet? Yes   Assessment information obtained from? Patient   Communicated expected length of stay with patient/caregiver yes   Prior to hospitilization cognitive status: Alert/Oriented   Prior to hospitalization functional status: Independent;Assistive Equipment   Current cognitive status: Alert/Oriented   Current Functional Status: Independent;Assistive Equipment   Lives With sibling(s)   Able to Return to Prior Arrangements yes   Is patient able to care for self after discharge? Yes   Patient's perception of discharge disposition home or selfcare   Readmission Within the Last 30 Days no previous admission in last 30 days   Patient currently being followed by outpatient case management? No   Patient currently receives any other outside agency services? No   Equipment Currently Used at Home none   Do you have any problems affording any of your prescribed medications? No   Is the patient taking medications as prescribed? yes   Does the patient have transportation home? No   Does the patient receive services at the [facility]? No   Discharge Plan A Home;Home with family   Discharge Plan B Home;Home with family   DME Needed Upon Discharge  none   Patient/Family in Agreement with Plan yes     Gladis Smart RN, CCM, CMSRN  RN Transition Navigator  [phone]
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
## 2020-09-24 NOTE — MEDICAL/APP STUDENT
"[facility] IM Medical Student Progress Note    Subjective:      Mr. David slept well overnight and feels well this morning. He has not had a bowel movement since admission.  He denies any overnight SOB, fever, chills, nausea, or vomiting.    His diaz remains in place and his urine remains dark red.     Objective:   24 Hour Events:  NPO overnight     Last 24 Hour Vital Signs:  BP  Min: 103/67  Max: 141/66  Temp  Av.7 °F (37.1 °C)  Min: 98 °F (36.7 °C)  Max: 99.3 °F (37.4 °C)  Pulse  Av.8  Min: 66  Max: 86  Resp  Av.8  Min: 16  Max: 24  SpO2  Av.4 %  Min: 98 %  Max: 100 %  Height  Av' 1.5" (186.7 cm)  Min: 6' 1.5" (186.7 cm)  Max: 6' 1.5" (186.7 cm)  Weight  Av.1 kg (136 lb 15.1 oz)  Min: 61.2 kg (135 lb)  Max: 63 kg (138 lb 14.2 oz)  I/O last 3 completed shifts:  In: 50 [IV Piggyback:50]  Out: -     Physical Examination:  Constitutional: He is oriented to person, place, and time. He does not appear ill. No distress.   HENT:   Head: Normocephalic and atraumatic.   Right Ear: External ear normal.   Left Ear: External ear normal.   Nose: No rhinorrhea or nasal congestion.   Mouth/Throat: Mucous membranes are moist. No oropharyngeal exudate. Sublingual jaundice present   Eyes: Scleral icterus present.   Cardiovascular: Normal rate, regular rhythm and normal pulses. Exam reveals no gallop.   No murmur heard.  Pulmonary/Chest: Effort normal and breath sounds normal. No stridor. No respiratory distress. He has no wheezes.   Abdominal: Soft. Normal appearance and bowel sounds are normal. He exhibits no distension.   Musculoskeletal:         General: No swelling or tenderness.   Neurological: He is alert and oriented to person, place, and time. He displays no weakness.   Skin: Skin is warm and dry. No rash noted. No erythema    Laboratory:  Laboratory Data Reviewed: yes  Pertinent Findings:   Trended Lab Data:  Recent Labs   Lab 20  1015 20  1844 20  0213   WBC 6.53 5.81 5.18   HGB " "6.3* 6.6* 7.0*   HCT 20.3* 20.4* 21.8*    271 285   MCV 99* 92 90   RDW 21.6* 25.1* 26.6*    137 138   K 3.2* 4.3 3.7   CL 97 98 99   CO2 28 28 28   BUN 13 15 17   CREATININE 1.2 1.3 1.2   GLU 98 88 99   PROT 8.2  --  7.4   ALBUMIN 3.8  --  3.4*   BILITOT 3.1*  --  2.5*   *  --  104*   ALKPHOS 74  --  66   ALT 15  --  14       Trended Cardiac Data:  Recent Labs   Lab 09/23/20  1015 09/23/20  1844 09/24/20  0213   TROPONINI 0.049* 0.039* 0.051*   BNP 1,303*  --   --        Microbiology Data Reviewed: yes  Pertinent Findings:  9/23 Urine Culture: No Result  9/23 Blood Culture x2: NGTD    Other Results:  EKG (my interpretation):Sinus Rhythm, unchanged from previous    Radiology Data Reviewed: yes  Pertinent Findings:  9/23 CT Renal stone: no acute processes  9/23 CT Urogram: no definite acute findings    Current Medications:     Infusions:       Scheduled:   cefTRIAXone (ROCEPHIN) IVPB  1 g Intravenous Q24H    levothyroxine  50 mcg Oral Before breakfast    metoprolol succinate  12.5 mg Oral Daily    polyethylene glycol  17 g Oral Daily    senna  8.6 mg Oral Daily        PRN:  sodium chloride, albuterol-ipratropium, hepatitis A virus vaccine (PF), influenza, sodium chloride 0.9%  /  Antibiotics and Day Number of Therapy:  Ceftriaxone 1g, Day 1    Assessment/Plan:     Anirudh David is a 60 y.o.male with    Hematuria  -2.5 week history of painless gross hematuria. After MVR on ASA (not taking)  -likely preceded by microhematuria as CBC shows decrease in H/H  in start of august prior to onset of gross hematuria. 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 "chemicals", Smoking Hx 1 pack a day for 34 years concerning for malignancy > glomerulonephritis in the setting of untreated Hep C > Urinary tract infection  -ED U/A >100 RBCs, 36 WBC, 2 hyaline casts, 2+ protein, 4-6 urobilogen  -Cr 1.2 on admit  -CT Renal stone unremarkable  -CT urogram unremarkable  -diaz placed, NPO after " midnight for possible urological procedure in AM  -9/23 diaz remains in place with dark red urine  -Consulted Urology, awaiting recs     Chronic Blood Loss Anemia, likely 2/2 to hematuria   -ED H/H 6.3/20.3 repeat 6.6/20.4, receiving 1 unit RBC  -Likely chronic, 8/3 admission 10.1/30.1  -No iron studies ordered due to receiving unit RBC  -9/24 H/H 7/21.8     Intermediate Troponin Elevation   -admit troponin 0.049--> 0.039-->0.051  -continue to trend Q6  -consult [facility] cards, awaiting recs     Chronic Combined HF (40-45%)  -8/31 TTE EF 40-45%, mild to moderate mitral regurgitation  -Mitral valve repair on 8/3/20  -admit BNP 1303, pt euvolemic on exam  -chest x ray without acute changes  -holding home metroprolol tartrate and bumetanide due to euvolemic status     Hypokalemia  -K 3.2 at admit, asymptomatic  -repleted with 40 Alverto KCL   -9/24 K 3.7  -Continue to monitor     chronic LBP with sciatica  -No urinary bowel incontinence, No numbness and weakness  -on tramadol and norco at home, ran out the prescriptions 2 days ago     Untreated Hep C  -elevated  at admit  -administer hep A vaccine     Hypothyroidism  -continue home levothyroxine 50mcg     severe mirtal regurgitation  -Mitral valve repair 8/3/20     Constipation  -large stool burden noted on CT, pt states last bowel movement was yesterday  -senna 8.6 mg QD and Miralax 17g QD ordered  -No BM since admission     Reported COPD  -administered albuterol nebulizer on previous admissions  -not formally diagnosed  -placed on PRN nebulizer        Code: Full  DVT ppx:  Diet: NPO    Willam Hernán  [facility] MS4  [facility] IM Service Team A    [facility] Medicine Hospitalist Pager numbers:   [facility] Hospitalist Medicine Team A (Mony/Stefan): [phone]  [facility] Hospitalist Medicine Team B (Ni/Gladis):  [phone]

## 2020-09-24 NOTE — CONSULTS
[facility]  Urology  Consult Note    Patient Name: Anirudh David  MRN: [id number]  Admission Date: 9/23/2020  Hospital Length of Stay: 0 days  Code Status: Full Code   Attending Provider: Dennise Aviles MD   Consulting Provider: Desiree Fletcher MD  Primary Care Physician: Meenakshi Reed MD  Principal Problem:Hematuria    Consults    Subjective:     HPI: 60 y.o. male with a history of hypothyroid, back pain, CHF (EF 40-45%), COPD, Hepatitis C and HTN. He presented with gross hematuria for about 3 weeks. He underwent a mitral valve replacement recently at [facility] on 8/3/20. Limited [facility] records show Hemoglobin on 8/4/20 was 9.5. Patient is unsure if he takes blood thinning medications. He is unsure if he takes prostate medications. He denies straining to urinate, or clot passage. He denies dysuria. States this has never happened before outside of 3 weeks ago. Received a blood transfusion yesterday, hgb responded well from 6.3 to 7. He is a former smoker, smoked for 34 pack years, quit 3/24/20.     Past Medical History:   Diagnosis Date    CHF (congestive heart failure)     COPD (chronic obstructive pulmonary disease)     Dermatitis     Hypertension        Past Surgical History:   Procedure Laterality Date    CORONARY ANGIOGRAPHY N/A 2/17/2020    Procedure: ANGIOGRAM, CORONARY ARTERY;  Surgeon: Tomy Patterson MD;  Location: [facility] CATH LAB/EP;  Service: Cardiology;  Laterality: N/A;    HAND SURGERY      LEFT HEART CATHETERIZATION N/A 2/17/2020    Procedure: Left heart cath;  Surgeon: Tomy Patterson MD;  Location: [facility] CATH LAB/EP;  Service: Cardiology;  Laterality: N/A;    MANDIBLE FRACTURE SURGERY         Review of patient's allergies indicates:   Allergen Reactions    Lisinopril Swelling       Family History     None          Tobacco Use    Smoking status: Former Smoker     Packs/day: 1.00     Years: 34.00     Pack years: 34.00     Types: Cigarettes     Quit date: 3/24/2020     Years  since quittin.5    Smokeless tobacco: Never Used   Substance and Sexual Activity    Alcohol use: Not Currently    Drug use: No    Sexual activity: Not on file       Review of Systems   Constitutional: Negative for activity change.   HENT: Negative for facial swelling.    Eyes: Negative for visual disturbance.   Respiratory: Negative for chest tightness.    Cardiovascular: Negative for chest pain.   Gastrointestinal: Negative for abdominal distention.   Musculoskeletal: Negative for gait problem.   Skin: Negative for color change.   Neurological: Negative for dizziness.   Hematological: Negative for adenopathy.   Psychiatric/Behavioral: Negative for agitation.       Objective:     Temp:  [97.6 °F (36.4 °C)-99.3 °F (37.4 °C)] 97.6 °F (36.4 °C)  Pulse:  [66-86] 66  Resp:  [16-24] 16  SpO2:  [98 %-100 %] 99 %  BP: (103-141)/(66-91) 130/78     Body mass index is 18.08 kg/m².           Lines/Drains/Airways     Drain                 Urethral Catheter 20 1935 Latex less than 1 day          Peripheral Intravenous Line                 Peripheral IV - Single Lumen 20 1323 20 G Left Forearm less than 1 day         Peripheral IV - Single Lumen 20 1853 20 G Left Antecubital less than 1 day                Physical Exam   Vitals reviewed.  Constitutional: He is oriented to person, place, and time. He appears well-developed.   HENT:   Head: Normocephalic and atraumatic.   Eyes: Conjunctivae are normal. Pupils are equal, round, and reactive to light.   Neck: Normal range of motion. Neck supple.   Pulmonary/Chest: Effort normal. No respiratory distress.   Abdominal: Soft. He exhibits no distension.   Genitourinary:    Genitourinary Comments: Urethral diaz draining translucent light red urine without any clots     Musculoskeletal: Normal range of motion.   Neurological: He is alert and oriented to person, place, and time.   Skin: Skin is warm and dry.     Psychiatric: His behavior is normal.       Significant  Labs:  BMP:  Recent Labs   Lab 09/23/20  1015 09/23/20  1844 09/24/20  0213    137 138   K 3.2* 4.3 3.7   CL 97 98 99   CO2 28 28 28   BUN 13 15 17   CREATININE 1.2 1.3 1.2   CALCIUM 9.3 9.1 9.0       CBC:  Recent Labs   Lab 09/23/20  1015 09/23/20  1844 09/24/20  0213   WBC 6.53 5.81 5.18   HGB 6.3* 6.6* 7.0*   HCT 20.3* 20.4* 21.8*    271 285       Recent Lab Results       09/24/20  0810   09/24/20  0213   09/23/20  1844   09/23/20  1505   09/23/20  1338        Unit Blood Type Code               Unit Expiration               Unit Blood Type               Albumin   3.4           Alkaline Phosphatase   66           ALT   14           Anion Gap   11 11         Aniso               Appearance, UA               aPTT               AST   104           Bacteria, UA               Baso #   0.03           Basophil%   0.6           Bilirubin (UA)               BILIRUBIN TOTAL   2.5  Comment:  For infants and newborns, interpretation of results should be based  on gestational age, weight and in agreement with clinical  observations.  Premature Infant recommended reference ranges:  Up to 24 hours.............<8.0 mg/dL  Up to 48 hours............<12.0 mg/dL  3-5 days..................<15.0 mg/dL  6-29 days.................<15.0 mg/dL             Blood Culture, Routine               BNP               BUN, Bld   17 15         Calcium   9.0 9.1         Chloride   99 98         CO2   28 28         CODING SYSTEM               Color, UA               Creatinine   1.2 1.3         Differential Method   Automated           DISPENSE STATUS               eGFR if    >60 >60         eGFR if non    >60  Comment:  Calculation used to obtain the estimated glomerular filtration  rate (eGFR) is the CKD-EPI equation.    60  Comment:  Calculation used to obtain the estimated glomerular filtration  rate (eGFR) is the CKD-EPI equation.            Eos #   0.1           Eosinophil%   2.7           Glucose    99 88         Glucose, UA               Gran # (ANC)   3.3           Gran%   63.1           Group & Rh               Hematocrit   21.8 20.4         Hemoglobin   7.0 6.6         Hyaline Casts, UA               Hypo               Immature Grans (Abs)   0.01  Comment:  Mild elevation in immature granulocytes is non specific and   can be seen in a variety of conditions including stress response,   acute inflammation, trauma and pregnancy. Correlation with other   laboratory and clinical findings is essential.             Immature Granulocytes   0.2           INDIRECT SAV               INR     1.1  Comment:  Coumadin Therapy:  2.0 - 3.0 for INR for all indicators except mechanical heart valves  and antiphospholipid syndromes which should use 2.5 - 3.5.           Ketones, UA               Lactate, Ramiro     1.3  Comment:  Falsely low lactic acid results can be found in samples   containing >=13.0 mg/dL total bilirubin and/or >=3.5 mg/dL   direct bilirubin.           Leukocytes, UA               Lymph #   1.1           Lymph%   21.2           Magnesium     1.8         MCH   28.9 29.7         MCHC   32.1 32.4         MCV   90 92         Microscopic Comment               Mono #   0.6           Mono%   12.2           MPV   10.0 9.6         NITRITE UA               nRBC   0           Occult Blood       Negative       Occult Blood UA               pH, UA               Platelet Estimate               Platelets   285 271         Poik               Potassium   3.7 4.3         Product Code               PROTEIN TOTAL   7.4           Protein, UA               Protime     11.6         RBC   2.42 2.22         RBC, UA               RDW   26.6 25.1         SARS-CoV-2 RNA, Amplification, Qual         Negative  Comment:  This test utilizes isothermal nucleic acid amplification   technology to detect the SARS-CoV-2 RdRp nucleic acid segment.   The analytical sensitivity (limit of detection) is 125 genome   equivalents/mL.   A POSITIVE  result implies infection with the SARS-CoV-2 virus;  the patient is presumed to be contagious.    A NEGATIVE result means that SARS-CoV-2 nucleic acids are not  present above the limit of detection. A NEGATIVE result should be   treated as presumptive. It does not rule out the possibility of   COVID-19 and should not be the sole basis for treatment decisions.   If COVID-19 is strongly suspected based on clinical and exposure   history, re-testing using an alternate molecular assay should be   considered.   This test is only for use under the Food and Drug   Administration s Emergency Use Authorization (EUA).   Commercial kits are provided by [employer].   Performance characteristics of the EUA have been independently  verified by [facility] Department of  Pathology and Laboratory Medicine.   _________________________________________________________________  The ID NOW COVID-19 Letter of Authorization, along with the   authorized Fact Sheet for Healthcare Providers, the authorized Fact  Sheet for Patients, and authorized labeling are available on the FDA   website:  www.fda.gov/MedicalDevices/Safety/EmergencySituations/ikm675155.htm       Sodium   138 137         Specific [place], UA               Specimen UA               Squam Epithel, UA               Troponin I 0.063  Comment:  The reference interval for Troponin I represents the 99th percentile   cutoff   for our facility and is consistent with 3rd generation assay   performance.   0.051  Comment:  The reference interval for Troponin I represents the 99th percentile   cutoff   for our facility and is consistent with 3rd generation assay   performance.   0.039  Comment:  The reference interval for Troponin I represents the 99th percentile   cutoff   for our facility and is consistent with 3rd generation assay   performance.           UNIT NUMBER               UROBILINOGEN UA               WBC Clumps, UA               WBC, UA               WBC    5.18 5.81                          09/23/20  1322   09/23/20  1315   09/23/20  1302   09/23/20  1201   09/23/20  1040        Unit Blood Type Code       7300       Unit Expiration       [id number]       Unit Blood Type       B POS       Albumin               Alkaline Phosphatase               ALT               Anion Gap               Aniso               Appearance, UA         Clear     aPTT               AST               Bacteria, UA         Occasional     Baso #               Basophil%               Bilirubin (UA)         1+  Comment:  Positive urine bilirubin is not confirmed. Correlate with   serum bilirubin and clinical presentation.       BILIRUBIN TOTAL               Blood Culture, Routine   No Growth to date[P] No Growth to date[P]         BNP               BUN, Bld               Calcium               Chloride               CO2               CODING SYSTEM       [id number]       Color, UA         DK. BROWN     Creatinine               Differential Method               DISPENSE STATUS       TRANSFUSED       eGFR if                eGFR if non                Eos #               Eosinophil%               Glucose               Glucose, UA         Negative     Gran # (ANC)               Gran%               Group & Rh B POS             Hematocrit               Hemoglobin               Hyaline Casts, UA         2     Hypo               Immature Grans (Abs)               Immature Granulocytes               INDIRECT SAV NEG             INR               Ketones, UA         Negative     Lactate, Ramiro 2.6  Comment:  Falsely low lactic acid results can be found in samples   containing >=13.0 mg/dL total bilirubin and/or >=3.5 mg/dL   direct bilirubin.               Leukocytes, UA         Negative     Lymph #               Lymph%               Magnesium               MCH               MCHC               MCV               Microscopic Comment         SEE COMMENT  Comment:  Other formed  elements not mentioned in the report are not   present in the microscopic examination.        Mono #               Mono%               MPV               NITRITE UA         Negative     nRBC               Occult Blood               Occult Blood UA         3+     pH, UA         7.0     Platelet Estimate               Platelets               Poik               Potassium               Product Code       [id number]       PROTEIN TOTAL               Protein, UA         2+  Comment:  Recommend a 24 hour urine protein or a urine   protein/creatinine ratio if globulin induced proteinuria is  clinically suspected.       Protime               RBC               RBC, UA         >100     RDW               SARS-CoV-2 RNA, Amplification, Qual               Sodium               Specific [place], UA         1.010     Specimen UA         Urine, Clean Catch     Squam Epithel, UA         16     Troponin I               UNIT NUMBER       [id number]       UROBILINOGEN UA         4.0-6.0     WBC Clumps, UA         Occasional     WBC, UA         36     WBC                                09/23/20  1015        Unit Blood Type Code       Unit Expiration       Unit Blood Type       Albumin 3.8     Alkaline Phosphatase 74     ALT 15     Anion Gap 12     Aniso Moderate     Appearance, UA       aPTT 25.9  Comment:  aPTT therapeutic range = 39-69 seconds          Bacteria, UA       Baso # 0.05     Basophil% 0.8     Bilirubin (UA)       BILIRUBIN TOTAL 3.1  Comment:  For infants and newborns, interpretation of results should be based  on gestational age, weight and in agreement with clinical  observations.  Premature Infant recommended reference ranges:  Up to 24 hours.............<8.0 mg/dL  Up to 48 hours............<12.0 mg/dL  3-5 days..................<15.0 mg/dL  6-29 days.................<15.0 mg/dL       Blood Culture, Routine       BNP 1,303  Comment:  Values of less than 100 pg/ml are consistent with non-CHF populations.     BUN,  Bld 13     Calcium 9.3     Chloride 97     CO2 28     CODING SYSTEM       Color, UA       Creatinine 1.2     Differential Method Automated     DISPENSE STATUS       eGFR if  >60     eGFR if non  >60  Comment:  Calculation used to obtain the estimated glomerular filtration  rate (eGFR) is the CKD-EPI equation.        Eos # 0.1     Eosinophil% 2.0     Glucose 98     Glucose, UA       Gran # (ANC) 4.5     Gran% 68.8     Group & Rh       Hematocrit 20.3     Hemoglobin 6.3     Hyaline Casts, UA       Hypo Occasional     Immature Grans (Abs) 0.06  Comment:  Mild elevation in immature granulocytes is non specific and   can be seen in a variety of conditions including stress response,   acute inflammation, trauma and pregnancy. Correlation with other   laboratory and clinical findings is essential.       Immature Granulocytes 0.9     INDIRECT SAV       INR 1.1  Comment:  Coumadin Therapy:  2.0 - 3.0 for INR for all indicators except mechanical heart valves  and antiphospholipid syndromes which should use 2.5 - 3.5.       Ketones, UA       Lactate, Ramiro       Leukocytes, UA       Lymph # 1.0     Lymph% 15.2     Magnesium       MCH 30.6     MCHC 31.0     MCV 99     Microscopic Comment       Mono # 0.8     Mono% 12.3     MPV 9.1     NITRITE UA       nRBC 0     Occult Blood       Occult Blood UA       pH, UA       Platelet Estimate Appears normal     Platelets 320     Poik Moderate     Potassium 3.2     Product Code       PROTEIN TOTAL 8.2     Protein, UA       Protime 11.4     RBC 2.06     RBC, UA       RDW 21.6     SARS-CoV-2 RNA, Amplification, Qual       Sodium 137     Specific Gravity, UA       Specimen UA       Squam Epithel, UA       Troponin I 0.049  Comment:  The reference interval for Troponin I represents the 99th percentile   cutoff   for our facility and is consistent with 3rd generation assay   performance.       UNIT NUMBER       UROBILINOGEN UA       WBC Clumps, UA       WBC, UA        WBC 6.53           Significant Imaging:  CT urogram 9/23/20 - Kidneys: Unenhanced 8 images of the kidneys without significant interval change relative to earlier same day examination.  No definite radiopaque urolithiasis is seen.  There is symmetric appearance of the kidneys on nephrographic phase imaging.  No definite filling defects are noted within the ureters.  A Diaz catheter balloon is noted within the urinary bladder.    Assessment and Plan:   60 y.o. male with gross hematuria, anemia requiring blood transfusion    Plan:  1. CT urogram 9/23/20 - Kidneys: Unenhanced 8 images of the kidneys without significant interval change relative to earlier same day examination.  No definite radiopaque urolithiasis is seen.  There is symmetric appearance of the kidneys on nephrographic phase imaging.  No definite filling defects are noted within the ureters.  A Diaz catheter balloon is noted within the urinary bladder. No adverse findings in upper tracts.   2. H/h stable after transfusion continue to trend with q6-q8 hours CBC.  3. If h/h remains stable for 24 hours, can discontinue diaz and discharge patient.   4. To complete gross hematuria workup, will need a urologic followup for outpatient cystoscopy. Discuss with case management - likely Dr. Luke versus [facility].    Active Diagnoses:    Diagnosis Date Noted POA    PRINCIPAL PROBLEM:  Hematuria [R31.9] 09/23/2020 Yes    Anemia [D64.9] 09/23/2020 Yes     Chronic    Anemia requiring transfusions [D64.9] 09/23/2020 Yes    Chronic hepatitis C without hepatic coma [B18.2]  Yes    Congestive heart failure [I50.9] 03/11/2020 Yes    Essential hypertension [I10] 02/21/2020 Yes      Problems Resolved During this Admission:       VTE Risk Mitigation (From admission, onward)         Ordered     IP VTE HIGH RISK PATIENT  Once      09/23/20 1829     Place sequential compression device  Until discontinued      09/23/20 1829                Thank you for your consult.      Desiree Fletcher MD  Urology  [facility]

## 2020-09-25 PROBLEM — D62 ACUTE ON CHRONIC BLOOD LOSS ANEMIA: Status: RESOLVED | Noted: 2020-01-01 | Resolved: 2020-01-01

## 2020-09-25 PROBLEM — D64.9 ANEMIA REQUIRING TRANSFUSIONS: Status: RESOLVED | Noted: 2020-01-01 | Resolved: 2020-01-01

## 2020-09-25 NOTE — PROGRESS NOTES
"[facility] IM Medical Student Progress Note    Subjective:      Mr. David slept well overnight and feels well this morning. Urine still dark red. He had a BM yesterday that he states looked normal for him without red/dark blood in stool. He denies SOB, CP, blurred vision, fever, chills, nausea, or vomiting.    Porter in place. Got 2nd unit of blood yesterday     Objective:       Last 24 Hour Vital Signs:  BP  Min: 124/65  Max: 158/95  Temp  Av.7 °F (36.5 °C)  Min: 96.4 °F (35.8 °C)  Max: 98.3 °F (36.8 °C)  Pulse  Av.4  Min: 66  Max: 84  Resp  Av.8  Min: 16  Max: 20  SpO2  Av.4 %  Min: 90 %  Max: 99 %  Height  Av' 1.5" (186.7 cm)  Min: 6' 1.5" (186.7 cm)  Max: 6' 1.5" (186.7 cm)  Weight  Av.6 kg (138 lb)  Min: 62.6 kg (138 lb)  Max: 62.6 kg (138 lb)  I/O last 3 completed shifts:  In: 2618 [P.O.:1098; Blood:1020; IV Piggyback:500]  Out: 3850 [Urine:3850]    Physical Examination:  Constitutional: He is oriented to person, place, and time. He does not appear ill. No distress.   HENT:   Head: Normocephalic and atraumatic.   Right Ear: External ear normal.   Left Ear: External ear normal.   Nose: No rhinorrhea or nasal congestion.   Mouth/Throat: Mucous membranes are moist. No oropharyngeal exudate. Sublingual jaundice present   Eyes: Scleral icterus present.   Cardiovascular: Normal rate, regular rhythm and normal pulses. Exam reveals no gallop.   No murmur heard.  Pulmonary/Chest: Effort normal and breath sounds normal. No stridor. No respiratory distress. He has no wheezes.   Abdominal: Soft. Normal appearance and bowel sounds are normal. He exhibits no distension.   Musculoskeletal:         General: No swelling or tenderness.   Neurological: He is alert and oriented to person, place, and time. He displays no weakness.   Skin: Skin is warm and dry. No rash noted. No erythema    Laboratory:  Laboratory Data Reviewed: yes  Pertinent Findings:   Trended Lab Data:  Recent Labs   Lab 20  1015 " 09/23/20  1844 09/24/20  0213 09/24/20 2037 09/25/20  0457   WBC 6.53 5.81 5.18 6.79 5.81   HGB 6.3* 6.6* 7.0* 7.7* 7.4*   HCT 20.3* 20.4* 21.8* 23.5* 22.5*    271 285 243 240   MCV 99* 92 90 91 90   RDW 21.6* 25.1* 26.6* 24.6* 24.5*    137 138  --  139   K 3.2* 4.3 3.7  --  3.7   CL 97 98 99  --  104   CO2 28 28 28  --  24   BUN 13 15 17  --  17   CREATININE 1.2 1.3 1.2  --  1.1   GLU 98 88 99  --  80   PROT 8.2  --  7.4  --  7.5   ALBUMIN 3.8  --  3.4*  --  3.2*   BILITOT 3.1*  --  2.5*  --  2.5*   *  --  104*  --  138*   ALKPHOS 74  --  66  --  69   ALT 15  --  14  --  16       Trended Cardiac Data:  Recent Labs   Lab 09/23/20  1015 09/23/20  1844 09/24/20 0213 09/24/20  0810   TROPONINI 0.049* 0.039* 0.051* 0.063*   BNP 1,303*  --   --   --        Microbiology Data Reviewed: yes  Pertinent Findings:  9/23 Urine Culture: No growth  9/23 Blood Culture x2: NGTD    Other Results:  EKG (my interpretation):Sinus Rhythm, unchanged from previous    Radiology Data Reviewed: yes  Pertinent Findings:  9/23 CT Renal stone: no acute processes  9/23 CT Urogram: no definite acute findings    TTE on 9/24/2020:    · The left ventricle is severely enlarged with mildly decreased systolic function. The estimated ejection fraction is 45%.  · The left ventricle is severely enlarged.  · Severe left atrial enlargement.  · Mildly to moderately reduced right ventricular systolic function.  · Mild right atrial enlargement.  · Normal central venous pressure (3 mmHg).  · The estimated PA systolic pressure is 55 mmHg.  · There is moderate pulmonary hypertension.  · Moderate-to-severe mitral regurgitation.  · Mild mitral stenosis.  · Mild tricuspid regurgitation.  · The aortic root is mildly dilated.  · There are segmental left ventricular wall motion abnormalities (consistent with WMA noted on outside facility TTEs).      Current Medications:       Scheduled:   cefTRIAXone (ROCEPHIN) IVPB  1 g Intravenous Q24H     "levothyroxine  50 mcg Oral Before breakfast    metoprolol succinate  12.5 mg Oral Daily    polyethylene glycol  17 g Oral Daily    senna  8.6 mg Oral Daily        PRN:  sodium chloride, albuterol-ipratropium, hepatitis A virus vaccine (PF), influenza, sodium chloride 0.9%  /  Antibiotics and Day Number of Therapy:  Ceftriaxone 1g, Day 2    Assessment/Plan:     Anirudh David is a 60 y.o.male with    Hematuria  -2.5 week history of painless gross hematuria. After MVR on ASA (not taking)  -likely preceded by microhematuria as CBC shows decrease in H/H  in start of august prior to onset of gross hematuria. 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 "chemicals", Smoking Hx 1 pack a day for 34 years concerning for malignancy > glomerulonephritis in the setting of untreated Hep C > Urinary tract infection  -ED U/A >100 RBCs, 36 WBC, 2 hyaline casts, 2+ protein, 4-6 urobilogen  -Cr 1.2 on admit  -CT Renal stone unremarkable  -CT urogram unremarkable  -diaz placed  -9/23 diaz remains in place with dark red urine  -9/25 diaz in place, urine darker red this AM  -9/25 H/H dropped 7.7/23.5-->7.4/22.5 overnight, after total of 2 units  -Urology recs: If H/H stable for 24hrs to discontinue diaz and schedule an outpatient cystoscopy  -To complete gross hematuria workup, will need a urologic followup for outpatient cystoscopy. Will need f/u with likely Dr. Luke versus [facility]     Acute on Chronic Blood Loss Anemia, likely 2/2 to hematuria   -ED H/H 6.3/20.3 repeat 6.6/20.4, receiving 1 unit RBC  -Likely chronic, 8/3 admission 10.1/30.1  -No iron studies ordered due to receiving unit RBC  -9/24 H/H 7/21.8  -1 unit RBCs 10am 9/24  -9/25 H/H dropped 7.7/23.5-->7.4/22.5 overnight    Intermediate Troponin Elevation   -admit troponin 0.049--> 0.039-->0.051-->0.063  -likely secondary to demand ischemia from severe anemia     Chronic Combined HF (40-45%)  -8/31 TTE EF 40-45%, mild to moderate mitral " regurgitation  -Mitral valve repair on 8/3/20  -admit BNP 1303, pt euvolemic on exam  -chest x ray without acute changes  -restarted home motoprolol 12.5  -9/24 TTE: EF 45%, severely enlarged LV, mod-severe mitral regurg    Hypokalemia  -K 3.2 at admit, asymptomatic  -repleted with 40 Alverto KCL   -9/24 K 3.7  -9/25 K 3.7  -Resolved, Continue to monitor     chronic LBP with sciatica  -No urinary bowel incontinence, No numbness and weakness  -on tramadol and norco at home, ran out the prescriptions 2 days ago prior to admit     Untreated Hep C  -elevated  at admit  -9/24   -9/25    -administer hep A vaccine     Hypothyroidism  -continue home levothyroxine 50mcg     severe mirtal regurgitation  -Mitral valve repair 8/3/20  -9/24 TTE: mod-severe mitral regurg     Constipation  -large stool burden noted on CT, pt states last bowel movement was yesterday  -senna 8.6 mg QD and Miralax 17g QD ordered  -Regular BM on 9/24     Reported COPD  -administered albuterol nebulizer on previous admissions  -not formally diagnosed  -placed on PRN nebulizer      Code: Full  DVT ppx:  Diet: Cardiac  Dispo: pending stabilization of H/H    Matilda Veliz MD  [facility] Internal Medicine HO - I    [facility] Medicine Hospitalist Pager numbers:   [facility] Hospitalist Medicine Team A (Mony/Stefan): [phone]  [facility] Hospitalist Medicine Team B (Ni/Gladis):  [phone]

## 2020-09-25 NOTE — PLAN OF CARE
Pt received 1 UPRB, Hemoglobin 7.7 and hematocrit 23.5 post tranfusion. Pt AAOx4, no c/o of pain. Porter catheter in place with bloody colored urine. No acute distress noted over night. NSR on telemetry monitor. Cont to monitor    Problem: Adult Inpatient Plan of Care  Goal: Plan of Care Review  9/25/2020 0431 by Theodore Amador RN  Outcome: Ongoing, Progressing     Problem: Adult Inpatient Plan of Care  Goal: Patient-Specific Goal (Individualization)  Outcome: Ongoing, Progressing     Problem: Fall Injury Risk  Goal: Absence of Fall and Fall-Related Injury  Outcome: Ongoing, Progressing     Problem: Anemia  Goal: Anemia Symptom Improvement  9/25/2020 0431 by Theodore Amador RN  Outcome: Ongoing, Progressing

## 2020-09-25 NOTE — PLAN OF CARE
Problem: Adult Inpatient Plan of Care  Goal: Plan of Care Review  9/25/2020 0929 by Judy C Jolissaint, RN  Outcome: Ongoing, Progressing  Labs. Notes and care plan reviewed

## 2020-09-25 NOTE — PLAN OF CARE
Future Appointments   Date Time Provider Department Center   10/27/2020 10:30 AM Geovany Luke MD [facility]     Follow-up With  Details  Why  Contact Info   Geovany Luke MD  On 10/27/2020  10:30 (urology)  [address]  [address]  [place] [address]  [phone]   Meenakshi RUBY (Khushboo) MD [facility]  On 10/2/2020  1:40pm  [address] [facility] [address] [phone]         Discharge rounds on patient. Discussed followup appointments, blue discharge folder, discharge nurse will go over home medications and reasons for medications and final discharge instructions. All patient/caregiver questions answered. Patient verbalized understanding.       09/25/20 1355   Final Note   Assessment Type Final Discharge Note   Anticipated Discharge Disposition Home   Hospital Follow Up  Appt(s) scheduled? Yes   Discharge plans and expectations educations in teach back method with documentation complete? Yes   Right Care Referral Info   Post Acute Recommendation No Care     Gladis Smart, RN, CCM, CMSRN  RN Transition Navigator  [phone]

## 2020-09-25 NOTE — NURSING
Pt still has concerns regarding the origin of his bleeding. Pt will get 1 unit transfused. See blood transfusion flowsheet.

## 2020-09-25 NOTE — NURSING
"Patient at nurses station angry and stating he needs his appointment at [place] cancelled and wants an appointment at [facility] d/t transportation. Informed patient that nurse spoke with case management and that [place] is closer. Patient states "I don't give a damn! I am taking the bus!" Patient walked downstairs to main lobby for discharge.     "

## 2020-09-25 NOTE — MEDICAL/APP STUDENT
\[facility]\"" Hospital Medicine Discharge Summary    Primary Team: \[facility]\"" Hospitalist Team A  Attending Physician: Dennise Aviles MD  Resident: Ulisses  Intern: Keren    Date of Admit: 2020  Date of Discharge: 2020    Discharge to: Home  Condition: Stable    Discharge Diagnoses     Patient Active Problem List  Hematuria  Acute on Chronic Blood Loss Anemia  Intermediate Troponin Elevation  Chronic Combined HF  Hypokalemia  Chronic Lower Back Pain with Sciatica  Untreated Hepatitis C  Severe Mitral Regurgitation  Constipation  COPD      Consultants and Procedures     Consultants:  Urology    Procedures:   None    Imagin/23 CT Urogram Abd Pelvis   CXR   CT Renal Stone Study    Brief History of Present Illness    Mr. Anirudh David is a 58 yo male with a past medical history of hypothyroidism, chronic LBP with sciatica, combined HF(EF 40-45% on ), COPD and HepC and hypertension. The patient presented to [facility] ED on 20 with a chief complaint of blood in his urine for 2.5 weeks. Pt presented to a follow up PCP appointment on Monday () where he was told to go to the ED for his hematuria.      Mr. Landon first noted darkening of his urine 2.5 weeks ago and his urine has remained red/dark since then.  He notes that's his urine is darkest in the mornings and denies any pain during urination.  His urine is dark throughout the entirety of voiding.  He has not noticed any clots, cloudiness, or sediment in his urine. He has not experienced any penile pain, discharge, itching, rash, or tenderness.  He reports a recent weight loss of 20 lbs over the last 3 months but denies fever or chills. Pt reports dizziness for past several weeks. Denies CP, SOB, N/V/C/D, abdominal pain, flank pain, blurred vision. Denies recent trauma.     Of note, the patient recently underwent mitral valve replacement for mitral insufficiency at [facility] on 8/3/20.    For the full HPI please refer to the  "History & Physical from this admission.    Hospital Course By Problem with Pertinent Findings     During his admission, Mr. Oleary continued to have gross hematuria and received two units of blood for his anemia. His post infusion hemoglobin remained stable and he is being discharged with recommendations for continued outpatient hematuria workup with urology.    Hematuria  -2.5 week history of painless gross hematuria. After MVR on ASA (not taking)  -likely preceded by microhematuria as CBC shows decrease in H/H  in start of august prior to onset of gross hematuria. Pt notes weight loss for 3.5 months, occupational exposure to "chemicals", Smoking Hx 1 pack a day for 34 years concerning for malignancy > glomerulonephritis in the setting of untreated Hep C > Urinary tract infection  -ED U/A >100 RBCs, 36 WBC, 2 hyaline casts, 2+ protein, 4-6 urobilogen  -Cr 1.2 on admit  -CT Renal stone unremarkable  -CT urogram unremarkable  -diaz placed  -9/23 diaz remains in place with dark red urine  -9/25 diaz in place, urine darker red this AM  -9/25 H/H dropped 7.7/23.5-->7.4/22.5 overnight, after total of 2 units  -Urology recs: If H/H stable for 24hrs to discontinue diaz and schedule an outpatient cystoscopy  -9/25 Per urology recs: diaz removed, ok to discharge with outpatient urology follow up  -To complete gross hematuria workup, will have urologic followup for outpatient cystoscopy with Dr. Luke      Acute on Chronic Blood Loss Anemia, likely 2/2 to hematuria   -ED H/H 6.3/20.3 repeat 6.6/20.4, receiving 1 unit RBC  -Likely chronic, 8/3 admission 10.1/30.1  -No iron studies ordered due to receiving unit RBC  -9/24 H/H 7/21.8  -1 unit RBCs 10am 9/24  -9/25 H/H 7.7/23.5-->7.4/22.5 overnight  -Stable for discharge  -Follow up with PCP     Intermediate Troponin Elevation   -admit troponin 0.049--> 0.039-->0.051-->0.063  -likely secondary to demand ischemia from severe anemia  -Follow up with established cardiology, " Dr. Gaytan     Chronic Combined HF (40-45%)  -8/31 TTE EF 40-45%, mild to moderate mitral regurgitation  -Mitral valve repair on 8/3/20  -admit BNP 1303, pt euvolemic on exam  -chest x ray without acute changes  -restarted home motoprolol 12.5  -9/24 TTE: EF 45%, severely enlarged LV, mod-severe mitral regurg  -Follow up with cardiologist, Dr. Gaytan     Hypokalemia  -K 3.2 at admit, asymptomatic  -repleted with 40 Alverto KCL   -9/24 K 3.7  -9/25 K 3.7  -Resolved,  -Follow up with PCP     chronic LBP with sciatica  -No urinary bowel incontinence, No numbness and weakness  -on tramadol and norco at home, ran out the prescriptions 2 days ago prior to admit  -Follow up with PCP     Untreated Hep C  -elevated  at admit  -9/24   -9/25    -administered hep A vaccine  -Follow up with ID     Hypothyroidism  -continue home levothyroxine 50mcg  -Follow up with PCP     severe mirtal regurgitation  -Mitral valve repair 8/3/20  -9/24 TTE: mod-severe mitral regurg  -Discharge with Toprol  XL 25 mg  -No ACEI/ARB due to previous history of angioedema  -Follow up with Cards     Constipation  -large stool burden noted on CT, pt states last bowel movement was yesterday  -senna 8.6 mg QD and Miralax 17g QD ordered  -Regular BM on 9/24     Reported COPD  -administered albuterol nebulizer on previous admissions  -not formally diagnosed  -placed on PRN nebulizer       Discharge Medications      Anirudh David   Home Medication Instructions JUAN:[id number]    Printed on:09/25/20 0214   Medication Information                      albuterol (PROVENTIL/VENTOLIN HFA) 90 mcg/actuation inhaler  Inhale 2 puffs into the lungs every 4 (four) hours as needed for Wheezing or Shortness of Breath (cough). Rescue             bumetanide (BUMEX) 1 MG tablet  Take 1 mg by mouth 2 (two) times daily.             HYDROcodone-acetaminophen (NORCO) 5-325 mg per tablet  Take 1 tablet by mouth every 4 (four) hours as needed for Pain.              levothyroxine (SYNTHROID) 50 MCG tablet  Take 50 mcg by mouth before breakfast.             metoprolol succinate (TOPROL-XL) 25 MG 24 hr tablet  Take 1/2 tablets (12.5 mg total) by mouth once daily.             nicotine (NICODERM CQ) 14 mg/24 hr  Place 1 patch onto the skin once daily.             traMADoL (ULTRAM-ER) 100 MG Tb24  Take 50 mg by mouth 2 (two) times a day.                 Discharge Information:   Diet:  Regular    Physical Activity:  As tolerated             Instructions:  1. Take all medications as prescribed  2. Keep all follow-up appointments  3. Return to the hospital or call your primary care physicians if any worsening symptoms such as fever, chest pain, shortness of breath, return of symptoms, or any other concerns.    Follow-Up Appointments:  Cardiology  Urology  PCP  Infectious Disease    Willam Jim  [facility] Internal [facility] MS4

## 2020-09-25 NOTE — NURSING
Porter catheter removed. Catheter tip intact. Patient tolerated well. Instructed patient to inform staff when he has first void. Patient verbalizes clear  understanding.

## 2020-09-25 NOTE — PLAN OF CARE
Problem: Adult Inpatient Plan of Care  Goal: Plan of Care Review  Outcome: Ongoing, Progressing  Labs, results, care plan reviewed

## 2020-09-25 NOTE — NURSING
Informed by tele monitor tech that patient has a 13 beat run of vtach. Patient is asymptomatic. Dr. Mckeon notified.

## 2020-09-25 NOTE — NURSING
IV removed, no bleeding noted. Tele monitor removed and returned. Discharge instructions and educational materials reviewed with patient. Awaiting transportation for discharge.

## 2020-09-25 NOTE — MEDICAL/APP STUDENT
"U IM Medical Student Progress Note    Subjective:      Mr. David slept well overnight and feels well this morning. He had a BM yesterday that he states looked normal for him without red/dark blood in stool. He denies any overnight SOB, fever, chills, nausea, or vomiting.    Porter in place and his urine remains a dark red.     Objective:   24 Hour Events:  1 unit RBC    Last 24 Hour Vital Signs:  BP  Min: 124/65  Max: 158/95  Temp  Av.7 °F (36.5 °C)  Min: 96.4 °F (35.8 °C)  Max: 98.2 °F (36.8 °C)  Pulse  Av.7  Min: 66  Max: 84  Resp  Av.5  Min: 16  Max: 18  SpO2  Av.5 %  Min: 90 %  Max: 99 %  Height  Av' 1.5" (186.7 cm)  Min: 6' 1.5" (186.7 cm)  Max: 6' 1.5" (186.7 cm)  Weight  Av.6 kg (138 lb)  Min: 62.6 kg (138 lb)  Max: 62.6 kg (138 lb)  I/O last 3 completed shifts:  In: 2618 [P.O.:1098; Blood:1020; IV Piggyback:500]  Out: 3850 [Urine:3850]    Physical Examination:  Constitutional: He is oriented to person, place, and time. He does not appear ill. No distress.   HENT:   Head: Normocephalic and atraumatic.   Right Ear: External ear normal.   Left Ear: External ear normal.   Nose: No rhinorrhea or nasal congestion.   Mouth/Throat: Mucous membranes are moist. No oropharyngeal exudate. Sublingual jaundice present   Eyes: Scleral icterus present.   Cardiovascular: Normal rate, regular rhythm and normal pulses. Exam reveals no gallop.   No murmur heard.  Pulmonary/Chest: Effort normal and breath sounds normal. No stridor. No respiratory distress. He has no wheezes.   Abdominal: Soft. Normal appearance and bowel sounds are normal. He exhibits no distension.   Musculoskeletal:         General: No swelling or tenderness.   Neurological: He is alert and oriented to person, place, and time. He displays no weakness.   Skin: Skin is warm and dry. No rash noted. No erythema    Laboratory:  Laboratory Data Reviewed: yes  Pertinent Findings:   Trended Lab Data:  Recent Labs   Lab 20  1015 " "09/23/20  1844 09/24/20 0213 09/24/20 2037 09/25/20  0457   WBC 6.53 5.81 5.18 6.79 5.81   HGB 6.3* 6.6* 7.0* 7.7* 7.4*   HCT 20.3* 20.4* 21.8* 23.5* 22.5*    271 285 243 240   MCV 99* 92 90 91 90   RDW 21.6* 25.1* 26.6* 24.6* 24.5*    137 138  --  139   K 3.2* 4.3 3.7  --  3.7   CL 97 98 99  --  104   CO2 28 28 28  --  24   BUN 13 15 17  --  17   CREATININE 1.2 1.3 1.2  --  1.1   GLU 98 88 99  --  80   PROT 8.2  --  7.4  --  7.5   ALBUMIN 3.8  --  3.4*  --  3.2*   BILITOT 3.1*  --  2.5*  --  2.5*   *  --  104*  --  138*   ALKPHOS 74  --  66  --  69   ALT 15  --  14  --  16       Trended Cardiac Data:  Recent Labs   Lab 09/23/20  1015 09/23/20  1844 09/24/20 0213 09/24/20  0810   TROPONINI 0.049* 0.039* 0.051* 0.063*   BNP 1,303*  --   --   --        Microbiology Data Reviewed: yes  Pertinent Findings:  9/23 Urine Culture: No growth  9/23 Blood Culture x2: NGTD    Other Results:  EKG (my interpretation):Sinus Rhythm, unchanged from previous    Radiology Data Reviewed: yes  Pertinent Findings:  9/23 CT Renal stone: no acute processes  9/23 CT Urogram: no definite acute findings    Current Medications:       Scheduled:   cefTRIAXone (ROCEPHIN) IVPB  1 g Intravenous Q24H    levothyroxine  50 mcg Oral Before breakfast    metoprolol succinate  12.5 mg Oral Daily    polyethylene glycol  17 g Oral Daily    senna  8.6 mg Oral Daily        PRN:  sodium chloride, albuterol-ipratropium, hepatitis A virus vaccine (PF), influenza, sodium chloride 0.9%  /  Antibiotics and Day Number of Therapy:  Ceftriaxone 1g, Day 1    Assessment/Plan:     Anirudh David is a 60 y.o.male with    Hematuria  -2.5 week history of painless gross hematuria. After MVR on ASA (not taking)  -likely preceded by microhematuria as CBC shows decrease in H/H  in start of august prior to onset of gross hematuria. 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 "chemicals", Smoking Hx 1 pack a day for 34 years " concerning for malignancy > glomerulonephritis in the setting of untreated Hep C > Urinary tract infection  -ED U/A >100 RBCs, 36 WBC, 2 hyaline casts, 2+ protein, 4-6 urobilogen  -Cr 1.2 on admit  -CT Renal stone unremarkable  -CT urogram unremarkable  -diaz placed, NPO after midnight for possible urological procedure in AM  -9/23 diaz remains in place with dark red urine  -9/25 diaz in place, urine darker red this AM  -9/25 H/H dropped 7.7/23.5-->7.4/22.5 overnight, after 1 unit RBC in morning  -Urology recs: If H/H stable for 24hrs to discontinue diaz and schedule an outpatient cystoscopy     Acute on Chronic Blood Loss Anemia, likely 2/2 to hematuria   -ED H/H 6.3/20.3 repeat 6.6/20.4, receiving 1 unit RBC  -Likely chronic, 8/3 admission 10.1/30.1  -No iron studies ordered due to receiving unit RBC  -9/24 H/H 7/21.8  -1 unit RBCs 10am 9/24  -9/25 H/H dropped 7.7/23.5-->7.4/22.5 overnight    Intermediate Troponin Elevation   -admit troponin 0.049--> 0.039-->0.051-->0.063  -likely secondary to demand ischemia from severe anemia     Chronic Combined HF (40-45%)  -8/31 TTE EF 40-45%, mild to moderate mitral regurgitation  -Mitral valve repair on 8/3/20  -admit BNP 1303, pt euvolemic on exam  -chest x ray without acute changes  -restarted home motoprolol 12.5  -9/24 TTE: EF 45%, severely enlarged LV, mod-severe mitral regurg    Hypokalemia  -K 3.2 at admit, asymptomatic  -repleted with 40 Alverto KCL   -9/24 K 3.7  -9/25 K 3.7  -Resolved, Continue to monitor     chronic LBP with sciatica  -No urinary bowel incontinence, No numbness and weakness  -on tramadol and norco at home, ran out the prescriptions 2 days ago prior to admit     Untreated Hep C  -elevated  at admit  -9/24   -9/25    -administer hep A vaccine     Hypothyroidism  -continue home levothyroxine 50mcg     severe mirtal regurgitation  -Mitral valve repair 8/3/20  -9/24 TTE: mod-severe mitral regurg     Constipation  -large stool  burden noted on CT, pt states last bowel movement was yesterday  -senna 8.6 mg QD and Miralax 17g QD ordered  -Regular BM on 9/24     Reported COPD  -administered albuterol nebulizer on previous admissions  -not formally diagnosed  -placed on PRN nebulizer      Code: Full  DVT ppx:  Diet: Cardiac  Dispo: pending stabilization of H/H    Willam Jim  [facility] Internal Medicine MS4  [facility] IM Service Team A    [facility] Medicine Hospitalist Pager numbers:   [facility] Hospitalist Medicine Team A (Mony/Stefan): [phone]  [facility] Hospitalist Medicine Team B (Ni/Gladis):  [phone]
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
## 2020-09-25 NOTE — DISCHARGE SUMMARY
\[facility]\"" Hospital Medicine Discharge Summary    Primary Team: \[facility] [place] [facility]\"" Hospitalist Team A  Attending Physician: Dennise Aviles MD  Resident: Ulisses  Intern: Keren    Date of Admit: 2020  Date of Discharge: 2020    Discharge to: Home  Condition: Stable    Discharge Diagnoses     Patient Active Problem List  Hematuria  Acute on Chronic Blood Loss Anemia  Intermediate Troponin Elevation  Chronic Combined HF  Hypokalemia  Chronic Lower Back Pain with Sciatica  Untreated Hepatitis C  Severe Mitral Regurgitation  Constipation  COPD      Consultants and Procedures     Consultants:  Urology    Procedures:   None    Imagin/23 CT Urogram Abd Pelvis   CXR   CT Renal Stone Study   TTE    Brief History of Present Illness   Mr. Anirudh David is a 60 yo male with a past medical history of hypothyroidism, chronic LBP with sciatica, combined HF(EF 40-45% on ), COPD and HepC and hypertension. The patient presented to [facility] ED on 20 with a chief complaint of blood in his urine for 2.5 weeks. Pt presented to a follow up PCP appointment on Monday () where he was told to go to the ED for his hematuria.      Mr. Landon first noted darkening of his urine 2.5 weeks ago and his urine has remained red/dark since then.  He notes that's his urine is darkest in the mornings and denies any pain during urination.  His urine is dark throughout the entirety of voiding.  He has not noticed any clots, cloudiness, or sediment in his urine. He has not experienced any penile pain, discharge, itching, rash, or tenderness.  He reports a recent weight loss of 20 lbs over the last 3 months but denies fever or chills. Pt reports dizziness for past several weeks. Denies CP, SOB, N/V/C/D, abdominal pain, flank pain, blurred vision. Denies recent trauma.     Of note, the patient recently underwent mitral valve replacement for mitral insufficiency at [facility] on 8/3/20.    For the full HPI please refer to  "the History & Physical from this admission.    Hospital Course By Problem with Pertinent Findings     During his admission, Mr. Oleary continued to have gross hematuria and received two units of blood for his anemia. His post infusion hemoglobin remained stable and he is being discharged with recommendations for continued hematuria workup with OP urology.    Hematuria  -2.5 week history of painless gross hematuria. After MVR on ASA (not taking)  -likely preceded by microhematuria as CBC shows decrease in H/H  in start of august prior to onset of gross hematuria. 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 Luke      Acute on Chronic Blood Loss Anemia, likely 2/2 to hematuria   -ED H/H 6.3/20.3 repeat 6.6/20.4, receiving 1 unit RBC  -Likely chronic, 8/3 admission 10.1/30.1  -No iron studies ordered due to receiving unit RBC  -9/24 H/H 7/21.8  -1 unit RBCs 10am 9/24  -9/25 H/H 7.7/23.5-->7.4/22.5 overnight  -Stable for discharge  -Follow up with PCP     Intermediate Troponin Elevation   -admit troponin 0.049--> 0.039-->0.051-->0.063  -likely secondary to demand ischemia from severe anemia  -Follow up with established cardiology, Dr. Gaytan     Chronic Combined HF (40-45%)  -8/31 TTE EF 40-45%, mild to moderate mitral regurgitation  -Mitral valve repair on 8/3/20  -admit BNP 1303, pt euvolemic on exam  -chest x ray without " acute changes  -restarted home motoprolol 12.5  -9/24 TTE: EF 45%, severely enlarged LV, mod-severe mitral regurg  -Discharge on Toprol  XL 25 mg and continue home bumetadine  -No ACEI/ARB due to previous history of angioedema  -Follow up with cardiologist, Dr. Gaytan     Hypokalemia(resolved)  -K 3.2 at admit, asymptomatic--> 3.7  -repleted with 40 Alverto KCL   -Follow up with PCP     chronic LBP with sciatica  -No urinary bowel incontinence, No numbness and weakness  -on tramadol and norco at home, ran out the prescriptions 2 days ago prior to admit  -Follow up with PCP     Untreated Hep C  -elevated  at admit  -9/24   -9/25    - pt refused hep A vaccine, says he is agreeable to do it in ID clinic  -Follow up with ID     Hypothyroidism  -continue home levothyroxine 50mcg  -Follow up with PCP     severe mirtal regurgitation  -Mitral valve repair 8/3/20  -9/24 TTE: mod-severe mitral regurg     Constipation  -large stool burden noted on CT, pt states last bowel movement was yesterday  -senna 8.6 mg QD and Miralax 17g QD ordered  -Regular BM on 9/24     Reported COPD  -administered albuterol nebulizer on previous admissions  -not formally diagnosed  -placed on PRN nebulizer  -discharge on home albuterol       Discharge Medications      Anirudh David   Home Medication Instructions JUAN:[id number]    Printed on:09/25/20 1116   Medication Information                      albuterol (PROVENTIL/VENTOLIN HFA) 90 mcg/actuation inhaler  Inhale 2 puffs into the lungs every 4 (four) hours as needed for Wheezing or Shortness of Breath (cough). Rescue             bumetanide (BUMEX) 1 MG tablet  Take 1 mg by mouth 2 (two) times daily.             HYDROcodone-acetaminophen (NORCO) 5-325 mg per tablet  Take 1 tablet by mouth every 4 (four) hours as needed for Pain.             levothyroxine (SYNTHROID) 50 MCG tablet  Take 50 mcg by mouth before breakfast.             metoprolol succinate (TOPROL-XL) 25 MG 24 hr  tablet  Take 1/2 tablets (12.5 mg total) by mouth once daily.             nicotine (NICODERM CQ) 14 mg/24 hr  Place 1 patch onto the skin once daily.             traMADoL (ULTRAM-ER) 100 MG Tb24  Take 50 mg by mouth 2 (two) times a day.                 Discharge Information:   Diet:  Cardiac    Physical Activity:  As tolerated             Instructions:  1. Take all medications as prescribed  2. Keep all follow-up appointments  3. Return to the hospital or call your primary care physicians if any worsening symptoms such as fever, chest pain, shortness of breath, return of symptoms, or any other concerns.    Follow-Up Appointments:  Cardiology  Urology  PCP  Infectious Disease      Matilda Veliz MD  [facility] Internal Medicine [facility]

## 2020-09-25 NOTE — NURSING
While performing immunization questions prior to administering Hep A vaccine patient stated he is allergic to latex. Patient stated he becomes itchy. Dr. Mueller notified and states to not administer vaccine and they will discuss on outpatient visit.
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
## 2021-01-01 ENCOUNTER — HOSPITAL ENCOUNTER (INPATIENT)
Facility: HOSPITAL | Age: 61
LOS: 1 days | DRG: 871 | End: 2021-03-17
Attending: EMERGENCY MEDICINE | Admitting: INTERNAL MEDICINE
Payer: MEDICAID

## 2021-01-01 ENCOUNTER — LAB VISIT (OUTPATIENT)
Dept: LAB | Facility: OTHER | Age: 61
End: 2021-01-01
Payer: MEDICAID

## 2021-01-01 VITALS
WEIGHT: 101.44 LBS | SYSTOLIC BLOOD PRESSURE: 94 MMHG | TEMPERATURE: 90 F | DIASTOLIC BLOOD PRESSURE: 47 MMHG | HEIGHT: 72 IN | OXYGEN SATURATION: 45 % | BODY MASS INDEX: 13.74 KG/M2

## 2021-01-01 DIAGNOSIS — Z20.822 ENCOUNTER FOR LABORATORY TESTING FOR COVID-19 VIRUS: ICD-10-CM

## 2021-01-01 DIAGNOSIS — A41.9 SEPSIS, DUE TO UNSPECIFIED ORGANISM, UNSPECIFIED WHETHER ACUTE ORGAN DYSFUNCTION PRESENT: Primary | ICD-10-CM

## 2021-01-01 DIAGNOSIS — J96.90 RESPIRATORY FAILURE, UNSPECIFIED CHRONICITY, UNSPECIFIED WHETHER WITH HYPOXIA OR HYPERCAPNIA: ICD-10-CM

## 2021-01-01 DIAGNOSIS — R57.9 SHOCK: ICD-10-CM

## 2021-01-01 DIAGNOSIS — I48.91 ATRIAL FIBRILLATION WITH TACHYCARDIC VENTRICULAR RATE: ICD-10-CM

## 2021-01-01 DIAGNOSIS — I49.9 ARRHYTHMIA: ICD-10-CM

## 2021-01-01 DIAGNOSIS — R06.03 RESPIRATORY DISTRESS: ICD-10-CM

## 2021-01-01 DIAGNOSIS — I46.9 CARDIAC ARREST: ICD-10-CM

## 2021-01-01 DIAGNOSIS — Z91.89 H/O DIFFICULT INTUBATION: ICD-10-CM

## 2021-01-01 LAB
ALBUMIN SERPL BCP-MCNC: 1.4 G/DL (ref 3.5–5.2)
ALBUMIN SERPL BCP-MCNC: 1.6 G/DL (ref 3.5–5.2)
ALBUMIN SERPL BCP-MCNC: 2.6 G/DL (ref 3.5–5.2)
ALLENS TEST: ABNORMAL
ALP SERPL-CCNC: 136 U/L (ref 55–135)
ALP SERPL-CCNC: 287 U/L (ref 55–135)
ALP SERPL-CCNC: 328 U/L (ref 55–135)
ALT SERPL W/O P-5'-P-CCNC: 1580 U/L (ref 10–44)
ALT SERPL W/O P-5'-P-CCNC: 1749 U/L (ref 10–44)
ALT SERPL W/O P-5'-P-CCNC: 79 U/L (ref 10–44)
AMPHET+METHAMPHET UR QL: NEGATIVE
ANION GAP SERPL CALC-SCNC: 21 MMOL/L (ref 8–16)
ANION GAP SERPL CALC-SCNC: 25 MMOL/L (ref 8–16)
ANION GAP SERPL CALC-SCNC: ABNORMAL MMOL/L (ref 8–16)
ANISOCYTOSIS BLD QL SMEAR: SLIGHT
APTT BLDCRRT: 25.6 SEC (ref 21–32)
AST SERPL-CCNC: 122 U/L (ref 10–40)
AST SERPL-CCNC: 3247 U/L (ref 10–40)
AST SERPL-CCNC: 3727 U/L (ref 10–40)
B-OH-BUTYR BLD STRIP-SCNC: 0.2 MMOL/L (ref 0–0.5)
BACTERIA #/AREA URNS AUTO: ABNORMAL /HPF
BARBITURATES UR QL SCN>200 NG/ML: NEGATIVE
BASO STIPL BLD QL SMEAR: ABNORMAL
BASOPHILS # BLD AUTO: 0.08 K/UL (ref 0–0.2)
BASOPHILS NFR BLD: 0 % (ref 0–1.9)
BASOPHILS NFR BLD: 0.5 % (ref 0–1.9)
BENZODIAZ UR QL SCN>200 NG/ML: NEGATIVE
BILIRUB DIRECT SERPL-MCNC: 0.9 MG/DL (ref 0.1–0.3)
BILIRUB SERPL-MCNC: 0.8 MG/DL (ref 0.1–1)
BILIRUB SERPL-MCNC: 1.2 MG/DL (ref 0.1–1)
BILIRUB SERPL-MCNC: 1.3 MG/DL (ref 0.1–1)
BILIRUB UR QL STRIP: NEGATIVE
BILIRUB UR QL STRIP: NEGATIVE
BNP SERPL-MCNC: 772 PG/ML (ref 0–99)
BUN SERPL-MCNC: 105 MG/DL (ref 6–20)
BUN SERPL-MCNC: 109 MG/DL (ref 6–20)
BUN SERPL-MCNC: 118 MG/DL (ref 6–20)
BUN SERPL-MCNC: >140 MG/DL (ref 6–30)
BURR CELLS BLD QL SMEAR: ABNORMAL
BZE UR QL SCN: NEGATIVE
CALCIUM SERPL-MCNC: 10.1 MG/DL (ref 8.7–10.5)
CALCIUM SERPL-MCNC: 8.6 MG/DL (ref 8.7–10.5)
CALCIUM SERPL-MCNC: 9 MG/DL (ref 8.7–10.5)
CANNABINOIDS UR QL SCN: NEGATIVE
CHLORIDE SERPL-SCNC: 107 MMOL/L (ref 95–110)
CHLORIDE SERPL-SCNC: 108 MMOL/L (ref 95–110)
CHLORIDE SERPL-SCNC: 113 MMOL/L (ref 95–110)
CHLORIDE SERPL-SCNC: 117 MMOL/L (ref 95–110)
CLARITY UR REFRACT.AUTO: ABNORMAL
CLARITY UR REFRACT.AUTO: ABNORMAL
CO2 SERPL-SCNC: 12 MMOL/L (ref 23–29)
CO2 SERPL-SCNC: 7 MMOL/L (ref 23–29)
CO2 SERPL-SCNC: <5 MMOL/L (ref 23–29)
COLOR UR AUTO: ABNORMAL
COLOR UR AUTO: YELLOW
CREAT SERPL-MCNC: 3.4 MG/DL (ref 0.5–1.4)
CREAT UR-MCNC: 25 MG/DL (ref 23–375)
CREAT UR-MCNC: 25 MG/DL (ref 23–375)
CTP QC/QA: YES
DELSYS: ABNORMAL
DIFFERENTIAL METHOD: ABNORMAL
DIFFERENTIAL METHOD: ABNORMAL
EOSINOPHIL # BLD AUTO: 0 K/UL (ref 0–0.5)
EOSINOPHIL NFR BLD: 0 % (ref 0–8)
EOSINOPHIL NFR BLD: 0.1 % (ref 0–8)
ERYTHROCYTE [DISTWIDTH] IN BLOOD BY AUTOMATED COUNT: 17.2 % (ref 11.5–14.5)
ERYTHROCYTE [DISTWIDTH] IN BLOOD BY AUTOMATED COUNT: 17.3 % (ref 11.5–14.5)
ERYTHROCYTE [SEDIMENTATION RATE] IN BLOOD BY WESTERGREN METHOD: 18 MM/H
ERYTHROCYTE [SEDIMENTATION RATE] IN BLOOD BY WESTERGREN METHOD: 25 MM/H
EST. GFR  (AFRICAN AMERICAN): 21.6 ML/MIN/1.73 M^2
EST. GFR  (NON AFRICAN AMERICAN): 18.7 ML/MIN/1.73 M^2
ETHANOL UR-MCNC: <10 MG/DL
FIO2: 100
FIO2: 50
FIO2: 50
FIO2: 70
GLUCOSE SERPL-MCNC: 157 MG/DL (ref 70–110)
GLUCOSE SERPL-MCNC: 163 MG/DL (ref 70–110)
GLUCOSE SERPL-MCNC: 329 MG/DL (ref 70–110)
GLUCOSE SERPL-MCNC: 525 MG/DL (ref 70–110)
GLUCOSE UR QL STRIP: ABNORMAL
GLUCOSE UR QL STRIP: NEGATIVE
HCO3 UR-SCNC: 12.3 MMOL/L (ref 24–28)
HCO3 UR-SCNC: 14.4 MMOL/L (ref 24–28)
HCO3 UR-SCNC: 5.8 MMOL/L (ref 24–28)
HCO3 UR-SCNC: 5.9 MMOL/L (ref 24–28)
HCO3 UR-SCNC: 9 MMOL/L (ref 24–28)
HCT VFR BLD AUTO: 36 % (ref 40–54)
HCT VFR BLD AUTO: 47 % (ref 40–54)
HCT VFR BLD CALC: 33 %PCV (ref 36–54)
HCT VFR BLD CALC: 48 %PCV (ref 36–54)
HGB BLD-MCNC: 10.3 G/DL (ref 14–18)
HGB BLD-MCNC: 14.2 G/DL (ref 14–18)
HGB UR QL STRIP: ABNORMAL
HGB UR QL STRIP: NEGATIVE
HYALINE CASTS UR QL AUTO: 0 /LPF
IMM GRANULOCYTES # BLD AUTO: 0.54 K/UL (ref 0–0.04)
IMM GRANULOCYTES # BLD AUTO: ABNORMAL K/UL (ref 0–0.04)
IMM GRANULOCYTES NFR BLD AUTO: 3.5 % (ref 0–0.5)
IMM GRANULOCYTES NFR BLD AUTO: ABNORMAL % (ref 0–0.5)
INR PPP: 1.1 (ref 0.8–1.2)
INR PPP: 2.5 (ref 0.8–1.2)
KETONES UR QL STRIP: NEGATIVE
KETONES UR QL STRIP: NEGATIVE
LACTATE SERPL-SCNC: 9.5 MMOL/L (ref 0.5–2.2)
LACTATE SERPL-SCNC: >12 MMOL/L (ref 0.5–2.2)
LACTATE SERPL-SCNC: >12 MMOL/L (ref 0.5–2.2)
LDH SERPL L TO P-CCNC: 8.86 MMOL/L (ref 0.5–2.2)
LEUKOCYTE ESTERASE UR QL STRIP: NEGATIVE
LEUKOCYTE ESTERASE UR QL STRIP: NEGATIVE
LYMPHOCYTES # BLD AUTO: 1.5 K/UL (ref 1–4.8)
LYMPHOCYTES NFR BLD: 10 % (ref 18–48)
LYMPHOCYTES NFR BLD: 15 % (ref 18–48)
MAGNESIUM SERPL-MCNC: 4.1 MG/DL (ref 1.6–2.6)
MAGNESIUM SERPL-MCNC: 4.7 MG/DL (ref 1.6–2.6)
MCH RBC QN AUTO: 27.4 PG (ref 27–31)
MCH RBC QN AUTO: 27.5 PG (ref 27–31)
MCHC RBC AUTO-ENTMCNC: 28.6 G/DL (ref 32–36)
MCHC RBC AUTO-ENTMCNC: 30.2 G/DL (ref 32–36)
MCV RBC AUTO: 91 FL (ref 82–98)
MCV RBC AUTO: 96 FL (ref 82–98)
METAMYELOCYTES NFR BLD MANUAL: 7 %
METHADONE UR QL SCN>300 NG/ML: NEGATIVE
MICROSCOPIC COMMENT: ABNORMAL
MIN VOL: 12
MIN VOL: 14.1
MIN VOL: 14.1
MIN VOL: 15.3
MODE: ABNORMAL
MONOCYTES # BLD AUTO: 0.6 K/UL (ref 0.3–1)
MONOCYTES NFR BLD: 3.9 % (ref 4–15)
MONOCYTES NFR BLD: 8 % (ref 4–15)
MYELOCYTES NFR BLD MANUAL: 5 %
NEUTROPHILS # BLD AUTO: 12.5 K/UL (ref 1.8–7.7)
NEUTROPHILS NFR BLD: 56 % (ref 38–73)
NEUTROPHILS NFR BLD: 82 % (ref 38–73)
NEUTS BAND NFR BLD MANUAL: 9 %
NITRITE UR QL STRIP: NEGATIVE
NITRITE UR QL STRIP: NEGATIVE
NRBC BLD-RTO: 3 /100 WBC
NRBC BLD-RTO: 5 /100 WBC
OPIATES UR QL SCN: NEGATIVE
OSMOLALITY SERPL: 363 MOSM/KG (ref 280–300)
OSMOLALITY UR: 380 MOSM/KG (ref 50–1200)
PCO2 BLDA: 24.5 MMHG (ref 35–45)
PCO2 BLDA: 24.8 MMHG (ref 35–45)
PCO2 BLDA: 34.6 MMHG (ref 35–45)
PCO2 BLDA: 41.1 MMHG (ref 35–45)
PCO2 BLDA: 60.9 MMHG (ref 35–45)
PCP UR QL SCN>25 NG/ML: NEGATIVE
PEEP: 5
PH SMN: 6.91 [PH] (ref 7.35–7.45)
PH SMN: 6.98 [PH] (ref 7.35–7.45)
PH SMN: 6.99 [PH] (ref 7.35–7.45)
PH SMN: 7.02 [PH] (ref 7.35–7.45)
PH SMN: 7.15 [PH] (ref 7.35–7.45)
PH UR STRIP: 5 [PH] (ref 5–8)
PH UR STRIP: 6 [PH] (ref 5–8)
PHOSPHATE SERPL-MCNC: 12.1 MG/DL (ref 2.7–4.5)
PHOSPHATE SERPL-MCNC: 6.7 MG/DL (ref 2.7–4.5)
PIP: 21
PIP: 23
PIP: 27
PIP: 30
PLATELET # BLD AUTO: 101 K/UL (ref 150–350)
PLATELET # BLD AUTO: 255 K/UL (ref 150–350)
PLATELET BLD QL SMEAR: ABNORMAL
PLATELET BLD QL SMEAR: ABNORMAL
PMV BLD AUTO: 11.4 FL (ref 9.2–12.9)
PMV BLD AUTO: 12.4 FL (ref 9.2–12.9)
PO2 BLDA: 150 MMHG (ref 80–100)
PO2 BLDA: 153 MMHG (ref 80–100)
PO2 BLDA: 166 MMHG (ref 80–100)
PO2 BLDA: 25 MMHG (ref 40–60)
PO2 BLDA: 297 MMHG (ref 80–100)
POC BE: -14 MMOL/L
POC BE: -20 MMOL/L
POC BE: -22 MMOL/L
POC BE: -25 MMOL/L
POC BE: -26 MMOL/L
POC IONIZED CALCIUM: 1.24 MMOL/L (ref 1.06–1.42)
POC IONIZED CALCIUM: 1.37 MMOL/L (ref 1.06–1.42)
POC SATURATED O2: 100 % (ref 95–100)
POC SATURATED O2: 31 % (ref 95–100)
POC SATURATED O2: 97 % (ref 95–100)
POC SATURATED O2: 98 % (ref 95–100)
POC SATURATED O2: 98 % (ref 95–100)
POC TCO2 (MEASURED): 15 MMOL/L (ref 23–29)
POC TCO2: 10 MMOL/L (ref 23–27)
POC TCO2: 14 MMOL/L (ref 23–27)
POC TCO2: 16 MMOL/L (ref 24–29)
POC TCO2: 7 MMOL/L (ref 23–27)
POC TCO2: 7 MMOL/L (ref 23–27)
POCT GLUCOSE: 186 MG/DL (ref 70–110)
POCT GLUCOSE: 251 MG/DL (ref 70–110)
POIKILOCYTOSIS BLD QL SMEAR: SLIGHT
POTASSIUM BLD-SCNC: 5.8 MMOL/L (ref 3.5–5.1)
POTASSIUM BLD-SCNC: 7.2 MMOL/L (ref 3.5–5.1)
POTASSIUM SERPL-SCNC: 6 MMOL/L (ref 3.5–5.1)
POTASSIUM SERPL-SCNC: 6.5 MMOL/L (ref 3.5–5.1)
POTASSIUM SERPL-SCNC: 7.3 MMOL/L (ref 3.5–5.1)
PROCALCITONIN SERPL IA-MCNC: 3.02 NG/ML
PROT SERPL-MCNC: 4.9 G/DL (ref 6–8.4)
PROT SERPL-MCNC: 5.8 G/DL (ref 6–8.4)
PROT SERPL-MCNC: 9.2 G/DL (ref 6–8.4)
PROT UR QL STRIP: ABNORMAL
PROT UR QL STRIP: NEGATIVE
PROTHROMBIN TIME: 12.4 SEC (ref 9–12.5)
PROTHROMBIN TIME: 26.1 SEC (ref 9–12.5)
RBC # BLD AUTO: 3.76 M/UL (ref 4.6–6.2)
RBC # BLD AUTO: 5.16 M/UL (ref 4.6–6.2)
RBC #/AREA URNS AUTO: 4 /HPF (ref 0–4)
SAMPLE: ABNORMAL
SARS-COV-2 RDRP RESP QL NAA+PROBE: NEGATIVE
SARS-COV-2 RNA RESP QL NAA+PROBE: NOT DETECTED
SARS-COV-2 RNA RESP QL NAA+PROBE: NOT DETECTED
SCHISTOCYTES BLD QL SMEAR: ABNORMAL
SITE: ABNORMAL
SODIUM BLD-SCNC: 143 MMOL/L (ref 136–145)
SODIUM BLD-SCNC: 147 MMOL/L (ref 136–145)
SODIUM SERPL-SCNC: 137 MMOL/L (ref 136–145)
SODIUM SERPL-SCNC: 140 MMOL/L (ref 136–145)
SODIUM SERPL-SCNC: 146 MMOL/L (ref 136–145)
SODIUM UR-SCNC: 98 MMOL/L (ref 20–250)
SP GR UR STRIP: 1.01 (ref 1–1.03)
SP GR UR STRIP: 1.02 (ref 1–1.03)
SP02: 93
SP02: 99
SPHEROCYTES BLD QL SMEAR: ABNORMAL
T4 FREE SERPL-MCNC: 0.81 NG/DL (ref 0.71–1.51)
TOXICOLOGY INFORMATION: NORMAL
TROPONIN I SERPL DL<=0.01 NG/ML-MCNC: 0.37 NG/ML (ref 0–0.03)
TROPONIN I SERPL DL<=0.01 NG/ML-MCNC: 1.11 NG/ML (ref 0–0.03)
TSH SERPL DL<=0.005 MIU/L-ACNC: 7.11 UIU/ML (ref 0.4–4)
URN SPEC COLLECT METH UR: ABNORMAL
URN SPEC COLLECT METH UR: ABNORMAL
UUN UR-MCNC: 220 MG/DL (ref 140–1050)
VT: 375
VT: 500
VT: 550
VT: 550
WBC # BLD AUTO: 15.27 K/UL (ref 3.9–12.7)
WBC # BLD AUTO: 23.09 K/UL (ref 3.9–12.7)
WBC #/AREA URNS AUTO: 7 /HPF (ref 0–5)
WBC TOXIC VACUOLES BLD QL SMEAR: PRESENT
YEAST UR QL AUTO: ABNORMAL

## 2021-01-01 PROCEDURE — 25000003 PHARM REV CODE 250: Performed by: STUDENT IN AN ORGANIZED HEALTH CARE EDUCATION/TRAINING PROGRAM

## 2021-01-01 PROCEDURE — 82962 GLUCOSE BLOOD TEST: CPT

## 2021-01-01 PROCEDURE — 84443 ASSAY THYROID STIM HORMONE: CPT | Performed by: STUDENT IN AN ORGANIZED HEALTH CARE EDUCATION/TRAINING PROGRAM

## 2021-01-01 PROCEDURE — 99900035 HC TECH TIME PER 15 MIN (STAT)

## 2021-01-01 PROCEDURE — 94761 N-INVAS EAR/PLS OXIMETRY MLT: CPT

## 2021-01-01 PROCEDURE — 99285 PR EMERGENCY DEPT VISIT,LEVEL V: ICD-10-PCS | Mod: CR,CS,, | Performed by: PHYSICIAN ASSISTANT

## 2021-01-01 PROCEDURE — 80076 HEPATIC FUNCTION PANEL: CPT | Performed by: CLINICAL NURSE SPECIALIST

## 2021-01-01 PROCEDURE — 84540 ASSAY OF URINE/UREA-N: CPT | Performed by: STUDENT IN AN ORGANIZED HEALTH CARE EDUCATION/TRAINING PROGRAM

## 2021-01-01 PROCEDURE — 94003 VENT MGMT INPAT SUBQ DAY: CPT

## 2021-01-01 PROCEDURE — 63600175 PHARM REV CODE 636 W HCPCS: Performed by: INTERNAL MEDICINE

## 2021-01-01 PROCEDURE — 63600175 PHARM REV CODE 636 W HCPCS: Performed by: CLINICAL NURSE SPECIALIST

## 2021-01-01 PROCEDURE — 83930 ASSAY OF BLOOD OSMOLALITY: CPT | Performed by: STUDENT IN AN ORGANIZED HEALTH CARE EDUCATION/TRAINING PROGRAM

## 2021-01-01 PROCEDURE — 84100 ASSAY OF PHOSPHORUS: CPT | Performed by: PHYSICIAN ASSISTANT

## 2021-01-01 PROCEDURE — 93010 EKG 12-LEAD: ICD-10-PCS | Mod: ,,, | Performed by: INTERNAL MEDICINE

## 2021-01-01 PROCEDURE — 99223 PR INITIAL HOSPITAL CARE,LEVL III: ICD-10-PCS | Mod: ,,, | Performed by: INTERNAL MEDICINE

## 2021-01-01 PROCEDURE — 27000221 HC OXYGEN, UP TO 24 HOURS

## 2021-01-01 PROCEDURE — U0003 INFECTIOUS AGENT DETECTION BY NUCLEIC ACID (DNA OR RNA); SEVERE ACUTE RESPIRATORY SYNDROME CORONAVIRUS 2 (SARS-COV-2) (CORONAVIRUS DISEASE [COVID-19]), AMPLIFIED PROBE TECHNIQUE, MAKING USE OF HIGH THROUGHPUT TECHNOLOGIES AS DESCRIBED BY CMS-2020-01-R: HCPCS

## 2021-01-01 PROCEDURE — 85025 COMPLETE CBC W/AUTO DIFF WBC: CPT | Performed by: PHYSICIAN ASSISTANT

## 2021-01-01 PROCEDURE — 99291 CRITICAL CARE FIRST HOUR: CPT | Mod: 25

## 2021-01-01 PROCEDURE — 84439 ASSAY OF FREE THYROXINE: CPT | Performed by: STUDENT IN AN ORGANIZED HEALTH CARE EDUCATION/TRAINING PROGRAM

## 2021-01-01 PROCEDURE — 84300 ASSAY OF URINE SODIUM: CPT | Performed by: STUDENT IN AN ORGANIZED HEALTH CARE EDUCATION/TRAINING PROGRAM

## 2021-01-01 PROCEDURE — 96368 THER/DIAG CONCURRENT INF: CPT | Mod: 59

## 2021-01-01 PROCEDURE — 99497 PR ADVNCD CARE PLAN 30 MIN: ICD-10-PCS | Mod: 25,,, | Performed by: INTERNAL MEDICINE

## 2021-01-01 PROCEDURE — 99291 PR CRITICAL CARE, E/M 30-74 MINUTES: ICD-10-PCS | Mod: ,,, | Performed by: INTERNAL MEDICINE

## 2021-01-01 PROCEDURE — 99292 PR CRITICAL CARE, ADDL 30 MIN: ICD-10-PCS | Mod: ,,, | Performed by: CLINICAL NURSE SPECIALIST

## 2021-01-01 PROCEDURE — 37799 UNLISTED PX VASCULAR SURGERY: CPT

## 2021-01-01 PROCEDURE — 25000003 PHARM REV CODE 250: Performed by: PHYSICIAN ASSISTANT

## 2021-01-01 PROCEDURE — 99497 ADVNCD CARE PLAN 30 MIN: CPT | Mod: 25,,, | Performed by: INTERNAL MEDICINE

## 2021-01-01 PROCEDURE — 36415 COLL VENOUS BLD VENIPUNCTURE: CPT | Performed by: STUDENT IN AN ORGANIZED HEALTH CARE EDUCATION/TRAINING PROGRAM

## 2021-01-01 PROCEDURE — 83605 ASSAY OF LACTIC ACID: CPT | Mod: 91 | Performed by: CLINICAL NURSE SPECIALIST

## 2021-01-01 PROCEDURE — 96366 THER/PROPH/DIAG IV INF ADDON: CPT | Mod: 59

## 2021-01-01 PROCEDURE — 93005 ELECTROCARDIOGRAM TRACING: CPT

## 2021-01-01 PROCEDURE — 27200966 HC CLOSED SUCTION SYSTEM

## 2021-01-01 PROCEDURE — 85027 COMPLETE CBC AUTOMATED: CPT | Performed by: STUDENT IN AN ORGANIZED HEALTH CARE EDUCATION/TRAINING PROGRAM

## 2021-01-01 PROCEDURE — U0002 COVID-19 LAB TEST NON-CDC: HCPCS | Performed by: EMERGENCY MEDICINE

## 2021-01-01 PROCEDURE — 63600175 PHARM REV CODE 636 W HCPCS

## 2021-01-01 PROCEDURE — 82803 BLOOD GASES ANY COMBINATION: CPT

## 2021-01-01 PROCEDURE — 81001 URINALYSIS AUTO W/SCOPE: CPT | Performed by: STUDENT IN AN ORGANIZED HEALTH CARE EDUCATION/TRAINING PROGRAM

## 2021-01-01 PROCEDURE — 31500 INSERT EMERGENCY AIRWAY: CPT

## 2021-01-01 PROCEDURE — 84484 ASSAY OF TROPONIN QUANT: CPT | Performed by: STUDENT IN AN ORGANIZED HEALTH CARE EDUCATION/TRAINING PROGRAM

## 2021-01-01 PROCEDURE — 25000003 PHARM REV CODE 250

## 2021-01-01 PROCEDURE — 85730 THROMBOPLASTIN TIME PARTIAL: CPT | Performed by: PHYSICIAN ASSISTANT

## 2021-01-01 PROCEDURE — 83735 ASSAY OF MAGNESIUM: CPT | Performed by: PHYSICIAN ASSISTANT

## 2021-01-01 PROCEDURE — 81003 URINALYSIS AUTO W/O SCOPE: CPT | Performed by: PHYSICIAN ASSISTANT

## 2021-01-01 PROCEDURE — 84100 ASSAY OF PHOSPHORUS: CPT | Performed by: STUDENT IN AN ORGANIZED HEALTH CARE EDUCATION/TRAINING PROGRAM

## 2021-01-01 PROCEDURE — 85007 BL SMEAR W/DIFF WBC COUNT: CPT | Performed by: STUDENT IN AN ORGANIZED HEALTH CARE EDUCATION/TRAINING PROGRAM

## 2021-01-01 PROCEDURE — 87077 CULTURE AEROBIC IDENTIFY: CPT | Performed by: PHYSICIAN ASSISTANT

## 2021-01-01 PROCEDURE — 63600175 PHARM REV CODE 636 W HCPCS: Performed by: STUDENT IN AN ORGANIZED HEALTH CARE EDUCATION/TRAINING PROGRAM

## 2021-01-01 PROCEDURE — 80053 COMPREHEN METABOLIC PANEL: CPT | Performed by: PHYSICIAN ASSISTANT

## 2021-01-01 PROCEDURE — 25000003 PHARM REV CODE 250: Performed by: INTERNAL MEDICINE

## 2021-01-01 PROCEDURE — 25000242 PHARM REV CODE 250 ALT 637 W/ HCPCS: Performed by: STUDENT IN AN ORGANIZED HEALTH CARE EDUCATION/TRAINING PROGRAM

## 2021-01-01 PROCEDURE — 82010 KETONE BODYS QUAN: CPT | Performed by: STUDENT IN AN ORGANIZED HEALTH CARE EDUCATION/TRAINING PROGRAM

## 2021-01-01 PROCEDURE — 63600175 PHARM REV CODE 636 W HCPCS: Performed by: EMERGENCY MEDICINE

## 2021-01-01 PROCEDURE — 87186 SC STD MICRODIL/AGAR DIL: CPT | Performed by: PHYSICIAN ASSISTANT

## 2021-01-01 PROCEDURE — 99292 CRITICAL CARE ADDL 30 MIN: CPT | Mod: ,,, | Performed by: CLINICAL NURSE SPECIALIST

## 2021-01-01 PROCEDURE — 85610 PROTHROMBIN TIME: CPT | Performed by: PHYSICIAN ASSISTANT

## 2021-01-01 PROCEDURE — 63600175 PHARM REV CODE 636 W HCPCS: Performed by: PHYSICIAN ASSISTANT

## 2021-01-01 PROCEDURE — 83880 ASSAY OF NATRIURETIC PEPTIDE: CPT | Performed by: PHYSICIAN ASSISTANT

## 2021-01-01 PROCEDURE — 36600 WITHDRAWAL OF ARTERIAL BLOOD: CPT

## 2021-01-01 PROCEDURE — 80053 COMPREHEN METABOLIC PANEL: CPT | Performed by: STUDENT IN AN ORGANIZED HEALTH CARE EDUCATION/TRAINING PROGRAM

## 2021-01-01 PROCEDURE — 96375 TX/PRO/DX INJ NEW DRUG ADDON: CPT | Mod: 59

## 2021-01-01 PROCEDURE — 99900026 HC AIRWAY MAINTENANCE (STAT)

## 2021-01-01 PROCEDURE — 85610 PROTHROMBIN TIME: CPT | Performed by: STUDENT IN AN ORGANIZED HEALTH CARE EDUCATION/TRAINING PROGRAM

## 2021-01-01 PROCEDURE — 25000003 PHARM REV CODE 250: Performed by: EMERGENCY MEDICINE

## 2021-01-01 PROCEDURE — 84145 PROCALCITONIN (PCT): CPT | Performed by: PHYSICIAN ASSISTANT

## 2021-01-01 PROCEDURE — 99223 1ST HOSP IP/OBS HIGH 75: CPT | Mod: ,,, | Performed by: INTERNAL MEDICINE

## 2021-01-01 PROCEDURE — 99285 EMERGENCY DEPT VISIT HI MDM: CPT | Mod: CR,CS,, | Performed by: PHYSICIAN ASSISTANT

## 2021-01-01 PROCEDURE — 83605 ASSAY OF LACTIC ACID: CPT | Performed by: PHYSICIAN ASSISTANT

## 2021-01-01 PROCEDURE — 94002 VENT MGMT INPAT INIT DAY: CPT

## 2021-01-01 PROCEDURE — 83935 ASSAY OF URINE OSMOLALITY: CPT | Performed by: STUDENT IN AN ORGANIZED HEALTH CARE EDUCATION/TRAINING PROGRAM

## 2021-01-01 PROCEDURE — 96365 THER/PROPH/DIAG IV INF INIT: CPT | Mod: 59

## 2021-01-01 PROCEDURE — 83605 ASSAY OF LACTIC ACID: CPT

## 2021-01-01 PROCEDURE — 87040 BLOOD CULTURE FOR BACTERIA: CPT | Performed by: PHYSICIAN ASSISTANT

## 2021-01-01 PROCEDURE — 80307 DRUG TEST PRSMV CHEM ANLYZR: CPT | Performed by: STUDENT IN AN ORGANIZED HEALTH CARE EDUCATION/TRAINING PROGRAM

## 2021-01-01 PROCEDURE — 83735 ASSAY OF MAGNESIUM: CPT | Performed by: STUDENT IN AN ORGANIZED HEALTH CARE EDUCATION/TRAINING PROGRAM

## 2021-01-01 PROCEDURE — 99291 CRITICAL CARE FIRST HOUR: CPT | Mod: ,,, | Performed by: INTERNAL MEDICINE

## 2021-01-01 PROCEDURE — 80048 BASIC METABOLIC PNL TOTAL CA: CPT | Performed by: CLINICAL NURSE SPECIALIST

## 2021-01-01 PROCEDURE — 84484 ASSAY OF TROPONIN QUANT: CPT | Performed by: PHYSICIAN ASSISTANT

## 2021-01-01 PROCEDURE — 20000000 HC ICU ROOM

## 2021-01-01 PROCEDURE — 93010 ELECTROCARDIOGRAM REPORT: CPT | Mod: ,,, | Performed by: INTERNAL MEDICINE

## 2021-01-01 PROCEDURE — 94640 AIRWAY INHALATION TREATMENT: CPT

## 2021-01-01 RX ORDER — SODIUM BICARBONATE 1 MEQ/ML
SYRINGE (ML) INTRAVENOUS
Status: COMPLETED
Start: 2021-01-01 | End: 2021-01-01

## 2021-01-01 RX ORDER — AMIODARONE HYDROCHLORIDE 150 MG/3ML
INJECTION, SOLUTION INTRAVENOUS CODE/TRAUMA/SEDATION MEDICATION
Status: COMPLETED | OUTPATIENT
Start: 2021-01-01 | End: 2021-01-01

## 2021-01-01 RX ORDER — PROPOFOL 10 MG/ML
INJECTION, EMULSION INTRAVENOUS
Status: DISCONTINUED
Start: 2021-01-01 | End: 2021-01-01 | Stop reason: WASHOUT

## 2021-01-01 RX ORDER — ALBUTEROL SULFATE 2.5 MG/.5ML
10 SOLUTION RESPIRATORY (INHALATION) ONCE
Status: COMPLETED | OUTPATIENT
Start: 2021-01-01 | End: 2021-01-01

## 2021-01-01 RX ORDER — INDOMETHACIN 25 MG/1
50 CAPSULE ORAL ONCE
Status: COMPLETED | OUTPATIENT
Start: 2021-01-01 | End: 2021-01-01

## 2021-01-01 RX ORDER — MUPIROCIN 20 MG/G
OINTMENT TOPICAL 2 TIMES DAILY
Status: DISCONTINUED | OUTPATIENT
Start: 2021-01-01 | End: 2021-01-01

## 2021-01-01 RX ORDER — ATROPINE SULFATE 0.1 MG/ML
1 INJECTION INTRAVENOUS ONCE
Status: COMPLETED | OUTPATIENT
Start: 2021-01-01 | End: 2021-01-01

## 2021-01-01 RX ORDER — SODIUM BICARBONATE 1 MEQ/ML
SYRINGE (ML) INTRAVENOUS CODE/TRAUMA/SEDATION MEDICATION
Status: COMPLETED | OUTPATIENT
Start: 2021-01-01 | End: 2021-01-01

## 2021-01-01 RX ORDER — ROCURONIUM BROMIDE 10 MG/ML
INJECTION, SOLUTION INTRAVENOUS
Status: DISPENSED
Start: 2021-01-01 | End: 2021-01-01

## 2021-01-01 RX ORDER — CALCIUM CHLORIDE INJECTION 100 MG/ML
INJECTION, SOLUTION INTRAVENOUS CODE/TRAUMA/SEDATION MEDICATION
Status: COMPLETED | OUTPATIENT
Start: 2021-01-01 | End: 2021-01-01

## 2021-01-01 RX ORDER — FENTANYL CITRATE-0.9 % NACL/PF 10 MCG/ML
0-250 PLASTIC BAG, INJECTION (ML) INTRAVENOUS CONTINUOUS
Status: DISCONTINUED | OUTPATIENT
Start: 2021-01-01 | End: 2021-01-01

## 2021-01-01 RX ORDER — EPINEPHRINE 0.1 MG/ML
INJECTION INTRAVENOUS CODE/TRAUMA/SEDATION MEDICATION
Status: COMPLETED | OUTPATIENT
Start: 2021-01-01 | End: 2021-01-01

## 2021-01-01 RX ORDER — ETOMIDATE 2 MG/ML
10 INJECTION INTRAVENOUS
Status: COMPLETED | OUTPATIENT
Start: 2021-01-01 | End: 2021-01-01

## 2021-01-01 RX ORDER — SODIUM BICARBONATE 1 MEQ/ML
50 SYRINGE (ML) INTRAVENOUS ONCE
Status: COMPLETED | OUTPATIENT
Start: 2021-01-01 | End: 2021-01-01

## 2021-01-01 RX ORDER — MORPHINE SULFATE 2 MG/ML
1 INJECTION, SOLUTION INTRAMUSCULAR; INTRAVENOUS
Status: DISCONTINUED | OUTPATIENT
Start: 2021-01-01 | End: 2021-01-01 | Stop reason: HOSPADM

## 2021-01-01 RX ORDER — VANCOMYCIN HCL IN 5 % DEXTROSE 1G/250ML
20 PLASTIC BAG, INJECTION (ML) INTRAVENOUS ONCE
Status: COMPLETED | OUTPATIENT
Start: 2021-01-01 | End: 2021-01-01

## 2021-01-01 RX ORDER — PHENYLEPHRINE HYDROCHLORIDE 10 MG/ML
INJECTION INTRAVENOUS
Status: COMPLETED
Start: 2021-01-01 | End: 2021-01-01

## 2021-01-01 RX ORDER — FLUDROCORTISONE ACETATE 0.1 MG/1
100 TABLET ORAL DAILY
Status: DISCONTINUED | OUTPATIENT
Start: 2021-01-01 | End: 2021-01-01

## 2021-01-01 RX ORDER — PHENYLEPHRINE HCL IN 0.9% NACL 1 MG/10 ML
SYRINGE (ML) INTRAVENOUS
Status: COMPLETED
Start: 2021-01-01 | End: 2021-01-01

## 2021-01-01 RX ORDER — INDOMETHACIN 25 MG/1
CAPSULE ORAL
Status: COMPLETED
Start: 2021-01-01 | End: 2021-01-01

## 2021-01-01 RX ORDER — NOREPINEPHRINE BITARTRATE/D5W 8 MG/250ML
0.1 PLASTIC BAG, INJECTION (ML) INTRAVENOUS CONTINUOUS
Status: DISCONTINUED | OUTPATIENT
Start: 2021-01-01 | End: 2021-01-01

## 2021-01-01 RX ORDER — FAMOTIDINE 10 MG/ML
20 INJECTION INTRAVENOUS DAILY
Status: DISCONTINUED | OUTPATIENT
Start: 2021-01-01 | End: 2021-01-01

## 2021-01-01 RX ORDER — PROPOFOL 10 MG/ML
INJECTION, EMULSION INTRAVENOUS
Status: COMPLETED
Start: 2021-01-01 | End: 2021-01-01

## 2021-01-01 RX ORDER — SUCCINYLCHOLINE CHLORIDE 20 MG/ML
INJECTION INTRAMUSCULAR; INTRAVENOUS
Status: DISCONTINUED
Start: 2021-01-01 | End: 2021-01-01 | Stop reason: WASHOUT

## 2021-01-01 RX ORDER — PHENYLEPHRINE HCL IN 0.9% NACL 1 MG/10 ML
SYRINGE (ML) INTRAVENOUS
Status: DISCONTINUED
Start: 2021-01-01 | End: 2021-01-01 | Stop reason: WASHOUT

## 2021-01-01 RX ORDER — FAMOTIDINE 10 MG/ML
20 INJECTION INTRAVENOUS EVERY 12 HOURS
Status: DISCONTINUED | OUTPATIENT
Start: 2021-01-01 | End: 2021-01-01

## 2021-01-01 RX ORDER — DEXTROSE 50 % IN WATER (D50W) INTRAVENOUS SYRINGE
25
Status: COMPLETED | OUTPATIENT
Start: 2021-01-01 | End: 2021-01-01

## 2021-01-01 RX ORDER — ATROPINE SULFATE 0.1 MG/ML
INJECTION INTRAVENOUS CODE/TRAUMA/SEDATION MEDICATION
Status: COMPLETED | OUTPATIENT
Start: 2021-01-01 | End: 2021-01-01

## 2021-01-01 RX ORDER — PHENYLEPHRINE HCL IN 0.9% NACL 20MG/250ML
0-5 PLASTIC BAG, INJECTION (ML) INTRAVENOUS CONTINUOUS
Status: DISCONTINUED | OUTPATIENT
Start: 2021-01-01 | End: 2021-01-01

## 2021-01-01 RX ORDER — NOREPINEPHRINE BITARTRATE 1 MG/ML
INJECTION, SOLUTION INTRAVENOUS
Status: COMPLETED
Start: 2021-01-01 | End: 2021-01-01

## 2021-01-01 RX ORDER — PROPOFOL 10 MG/ML
0-50 INJECTION, EMULSION INTRAVENOUS CONTINUOUS
Status: DISCONTINUED | OUTPATIENT
Start: 2021-01-01 | End: 2021-01-01

## 2021-01-01 RX ORDER — MAGNESIUM SULFATE HEPTAHYDRATE 500 MG/ML
INJECTION, SOLUTION INTRAMUSCULAR; INTRAVENOUS CODE/TRAUMA/SEDATION MEDICATION
Status: COMPLETED | OUTPATIENT
Start: 2021-01-01 | End: 2021-01-01

## 2021-01-01 RX ORDER — ETOMIDATE 2 MG/ML
INJECTION INTRAVENOUS
Status: COMPLETED
Start: 2021-01-01 | End: 2021-01-01

## 2021-01-01 RX ORDER — SODIUM CHLORIDE 0.9 % (FLUSH) 0.9 %
10 SYRINGE (ML) INJECTION
Status: DISCONTINUED | OUTPATIENT
Start: 2021-01-01 | End: 2021-01-01

## 2021-01-01 RX ORDER — PHENYLEPHRINE HCL IN 0.9% NACL 1 MG/10 ML
100 SYRINGE (ML) INTRAVENOUS CONTINUOUS PRN
Status: DISCONTINUED | OUTPATIENT
Start: 2021-01-01 | End: 2021-01-01

## 2021-01-01 RX ORDER — IPRATROPIUM BROMIDE AND ALBUTEROL SULFATE 2.5; .5 MG/3ML; MG/3ML
3 SOLUTION RESPIRATORY (INHALATION) EVERY 6 HOURS
Status: DISCONTINUED | OUTPATIENT
Start: 2021-01-01 | End: 2021-01-01

## 2021-01-01 RX ADMIN — NOREPINEPHRINE BITARTRATE 3 MCG/KG/MIN: 1 INJECTION, SOLUTION, CONCENTRATE INTRAVENOUS at 06:03

## 2021-01-01 RX ADMIN — Medication 100 MCG: at 03:03

## 2021-01-01 RX ADMIN — Medication 50 MEQ: at 08:03

## 2021-01-01 RX ADMIN — EPINEPHRINE 1 MG: 0.1 INJECTION, SOLUTION ENDOTRACHEAL; INTRACARDIAC; INTRAVENOUS at 07:03

## 2021-01-01 RX ADMIN — EPINEPHRINE 2 MCG/KG/MIN: 1 INJECTION INTRAMUSCULAR; INTRAVENOUS; SUBCUTANEOUS at 11:03

## 2021-01-01 RX ADMIN — DEXTROSE MONOHYDRATE 25 G: 25 INJECTION, SOLUTION INTRAVENOUS at 06:03

## 2021-01-01 RX ADMIN — AMIODARONE HYDROCHLORIDE 1 MG/MIN: 1.8 INJECTION, SOLUTION INTRAVENOUS at 09:03

## 2021-01-01 RX ADMIN — EPINEPHRINE 2 MCG/KG/MIN: 1 INJECTION INTRAMUSCULAR; INTRAVENOUS; SUBCUTANEOUS at 07:03

## 2021-01-01 RX ADMIN — AMIODARONE HYDROCHLORIDE 1 MG/MIN: 1.8 INJECTION, SOLUTION INTRAVENOUS at 01:03

## 2021-01-01 RX ADMIN — CALCIUM GLUCONATE 1 G: 98 INJECTION, SOLUTION INTRAVENOUS at 09:03

## 2021-01-01 RX ADMIN — MAGNESIUM SULFATE HEPTAHYDRATE 2 G: 500 INJECTION, SOLUTION INTRAMUSCULAR; INTRAVENOUS at 07:03

## 2021-01-01 RX ADMIN — Medication 2 MCG/KG/MIN: at 11:03

## 2021-01-01 RX ADMIN — Medication 100 MCG: at 04:03

## 2021-01-01 RX ADMIN — DEXTROSE MONOHYDRATE 25 G: 25 INJECTION, SOLUTION INTRAVENOUS at 09:03

## 2021-01-01 RX ADMIN — INSULIN HUMAN 5 UNITS: 100 INJECTION, SOLUTION PARENTERAL at 09:03

## 2021-01-01 RX ADMIN — ALBUTEROL SULFATE 10 MG: 2.5 SOLUTION RESPIRATORY (INHALATION) at 05:03

## 2021-01-01 RX ADMIN — Medication 0.15 MCG/KG/MIN: at 08:03

## 2021-01-01 RX ADMIN — Medication 2.6 MCG/KG/MIN: at 09:03

## 2021-01-01 RX ADMIN — PIPERACILLIN AND TAZOBACTAM 4.5 G: 4; .5 INJECTION, POWDER, LYOPHILIZED, FOR SOLUTION INTRAVENOUS; PARENTERAL at 04:03

## 2021-01-01 RX ADMIN — HYDROCORTISONE SODIUM SUCCINATE 100 MG: 100 INJECTION, POWDER, FOR SOLUTION INTRAMUSCULAR; INTRAVENOUS at 07:03

## 2021-01-01 RX ADMIN — SODIUM BICARBONATE 50 MEQ: 84 INJECTION, SOLUTION INTRAVENOUS at 09:03

## 2021-01-01 RX ADMIN — NOREPINEPHRINE BITARTRATE 3 MCG/KG/MIN: 1 INJECTION, SOLUTION, CONCENTRATE INTRAVENOUS at 09:03

## 2021-01-01 RX ADMIN — Medication 0.1 MCG/KG/MIN: at 08:03

## 2021-01-01 RX ADMIN — SODIUM BICARBONATE 50 MEQ: 84 INJECTION, SOLUTION INTRAVENOUS at 07:03

## 2021-01-01 RX ADMIN — NOREPINEPHRINE BITARTRATE 2.8 MCG/KG/MIN: 1 INJECTION, SOLUTION, CONCENTRATE INTRAVENOUS at 07:03

## 2021-01-01 RX ADMIN — Medication 2 MCG/KG/MIN: at 12:03

## 2021-01-01 RX ADMIN — VASOPRESSIN 0.04 UNITS/MIN: 20 INJECTION INTRAVENOUS at 10:03

## 2021-01-01 RX ADMIN — EPINEPHRINE 1.6 MCG/KG/MIN: 1 INJECTION INTRAMUSCULAR; INTRAVENOUS; SUBCUTANEOUS at 09:03

## 2021-01-01 RX ADMIN — INDOMETHACIN 50 MEQ: 25 CAPSULE ORAL at 07:03

## 2021-01-01 RX ADMIN — ATROPINE SULFATE 1 MG: 0.1 INJECTION PARENTERAL at 07:03

## 2021-01-01 RX ADMIN — ATROPINE SULFATE 1 MG: 0.1 INJECTION, SOLUTION ENDOTRACHEAL; INTRAMUSCULAR; INTRAVENOUS; SUBCUTANEOUS at 05:03

## 2021-01-01 RX ADMIN — PROPOFOL 5 MCG/KG/MIN: 10 INJECTION, EMULSION INTRAVENOUS at 11:03

## 2021-01-01 RX ADMIN — MORPHINE SULFATE 1 MG: 2 INJECTION, SOLUTION INTRAMUSCULAR; INTRAVENOUS at 10:03

## 2021-01-01 RX ADMIN — CALCIUM CHLORIDE 1 G: 100 INJECTION, SOLUTION INTRAVENOUS at 07:03

## 2021-01-01 RX ADMIN — INDOMETHACIN 50 MEQ: 25 CAPSULE ORAL at 09:03

## 2021-01-01 RX ADMIN — ETOMIDATE 10 MG: 2 INJECTION INTRAVENOUS at 07:03

## 2021-01-01 RX ADMIN — SODIUM BICARBONATE 50 MEQ: 84 INJECTION, SOLUTION INTRAVENOUS at 08:03

## 2021-01-01 RX ADMIN — NOREPINEPHRINE BITARTRATE 2 MCG/KG/MIN: 1 INJECTION, SOLUTION, CONCENTRATE INTRAVENOUS at 02:03

## 2021-01-01 RX ADMIN — SODIUM CHLORIDE 1000 ML: 0.9 INJECTION, SOLUTION INTRAVENOUS at 07:03

## 2021-01-01 RX ADMIN — IPRATROPIUM BROMIDE AND ALBUTEROL SULFATE 3 ML: .5; 2.5 SOLUTION RESPIRATORY (INHALATION) at 09:03

## 2021-01-01 RX ADMIN — CALCIUM GLUCONATE 1 G: 98 INJECTION, SOLUTION INTRAVENOUS at 07:03

## 2021-01-01 RX ADMIN — EPINEPHRINE 1.6 MCG/KG/MIN: 1 INJECTION INTRAMUSCULAR; INTRAVENOUS; SUBCUTANEOUS at 11:03

## 2021-01-01 RX ADMIN — EPINEPHRINE 2 MCG/KG/MIN: 1 INJECTION INTRAMUSCULAR; INTRAVENOUS; SUBCUTANEOUS at 04:03

## 2021-01-01 RX ADMIN — Medication 0.8 MCG/KG/MIN: at 08:03

## 2021-01-01 RX ADMIN — AMIODARONE HYDROCHLORIDE 300 MG: 50 INJECTION, SOLUTION INTRAVENOUS at 07:03

## 2021-01-01 RX ADMIN — EPINEPHRINE 1.2 MCG/KG/MIN: 1 INJECTION INTRAMUSCULAR; INTRAVENOUS; SUBCUTANEOUS at 02:03

## 2021-01-01 RX ADMIN — VASOPRESSIN 0.04 UNITS/MIN: 20 INJECTION INTRAVENOUS at 06:03

## 2021-01-01 RX ADMIN — Medication 0.5 MCG/KG/MIN: at 04:03

## 2021-01-01 RX ADMIN — SODIUM BICARBONATE: 84 INJECTION, SOLUTION INTRAVENOUS at 03:03

## 2021-01-01 RX ADMIN — EPINEPHRINE 0.5 MCG/KG/MIN: 1 INJECTION INTRAMUSCULAR; INTRAVENOUS; SUBCUTANEOUS at 07:03

## 2021-01-01 RX ADMIN — VANCOMYCIN HYDROCHLORIDE 1000 MG: 1 INJECTION, POWDER, LYOPHILIZED, FOR SOLUTION INTRAVENOUS at 08:03

## 2021-01-01 RX ADMIN — PHENYLEPHRINE HYDROCHLORIDE 1 MCG/KG/MIN: 10 INJECTION INTRAVENOUS at 08:03

## 2021-01-01 RX ADMIN — SODIUM CHLORIDE, SODIUM LACTATE, POTASSIUM CHLORIDE, AND CALCIUM CHLORIDE 1000 ML: .6; .31; .03; .02 INJECTION, SOLUTION INTRAVENOUS at 09:03

## 2021-01-01 RX ADMIN — INSULIN HUMAN 4.6 UNITS: 100 INJECTION, SOLUTION PARENTERAL at 06:03

## 2021-01-01 RX ADMIN — PIPERACILLIN AND TAZOBACTAM 4.5 G: 4; .5 INJECTION, POWDER, LYOPHILIZED, FOR SOLUTION INTRAVENOUS; PARENTERAL at 10:03

## 2021-01-01 RX ADMIN — AMIODARONE HYDROCHLORIDE 1 MG/MIN: 1.8 INJECTION, SOLUTION INTRAVENOUS at 07:03

## 2021-01-01 RX ADMIN — ETOMIDATE 10 MG: 40 INJECTION, SOLUTION INTRAVENOUS at 07:03

## 2021-03-16 PROBLEM — E87.5 HYPERKALEMIA: Status: ACTIVE | Noted: 2021-01-01

## 2021-03-16 PROBLEM — I46.9 CARDIAC ARREST: Status: ACTIVE | Noted: 2021-01-01

## 2021-03-16 PROBLEM — N17.9 ACUTE RENAL FAILURE: Status: ACTIVE | Noted: 2021-01-01

## 2021-03-16 PROBLEM — A41.9 SEPSIS: Status: ACTIVE | Noted: 2021-01-01

## 2021-03-16 PROBLEM — Z95.2 H/O MITRAL VALVE REPLACEMENT WITH MECHANICAL VALVE: Status: ACTIVE | Noted: 2021-01-01

## 2021-03-17 PROBLEM — K72.00 SHOCK LIVER: Status: ACTIVE | Noted: 2021-01-01

## 2021-03-17 PROBLEM — I48.0 PAROXYSMAL ATRIAL FIBRILLATION: Status: ACTIVE | Noted: 2021-01-01

## 2021-03-17 PROBLEM — Z51.5 PALLIATIVE CARE ENCOUNTER: Status: ACTIVE | Noted: 2021-01-01

## 2021-03-17 PROBLEM — G93.5 HERNIATION OF THE BRAIN: Status: ACTIVE | Noted: 2021-01-01

## 2021-03-17 PROBLEM — Z92.29 HISTORY OF OPIATE THERAPY: Status: ACTIVE | Noted: 2021-01-01

## 2021-03-17 PROBLEM — E87.20 METABOLIC ACIDOSIS: Status: ACTIVE | Noted: 2021-01-01

## 2021-03-19 LAB
BACTERIA BLD CULT: ABNORMAL

## 2021-03-24 ENCOUNTER — TELEPHONE (OUTPATIENT)
Dept: SMOKING CESSATION | Facility: CLINIC | Age: 61
End: 2021-03-24

## 2021-05-16 NOTE — CONSULTS
[facility]  Cardiology  Consult Note    Patient Name: Anirudh David  MRN: [id number]  Admission Date: 2/20/2020  Hospital Length of Stay: 1 days  Code Status: Full Code   Attending Provider: Carol Wright*   Consulting Provider: Kiet Shine NP  Primary Care Physician: Meenakshi Reed MD  Principal Problem:Pneumonia    Patient information was obtained from patient, past medical records and ER records.     Inpatient consult to [facility]  Consult performed by: Kiet Shine NP  Consult ordered by: Carol Wright MD        Subjective:     Chief Complaint:  Cough     HPI:   Anirudh David is a 59 y.o male with HTN, Dermatitis, cocaine abuse who presented to the ED for cough, congestion, ear pain and chest pain with cough for the past week.   Patient states he has tried over-the-counter Mucinex with minimal relief of symptoms. Patient reports chest pain which he attributes to his chest congestion. Denies any exertional symptoms. Patient rides a bike for transportation and denies any pain with this activity. He denies any MARI, edema, palpitations, syncope, dizziness. Troponin flat at 0.2. Patient underwent coronary angiography earlier this week noting normal coronary arteries. CXR cw bilateral LL PNA. LVEF low normal at 45-50%.     Past Medical History:   Diagnosis Date    Dermatitis     Hypertension        Past Surgical History:   Procedure Laterality Date    CORONARY ANGIOGRAPHY N/A 2/17/2020    Procedure: ANGIOGRAM, CORONARY ARTERY;  Surgeon: Tomy Patterson MD;  Location: [facility] CATH LAB/EP;  Service: Cardiology;  Laterality: N/A;    HAND SURGERY      LEFT HEART CATHETERIZATION N/A 2/17/2020    Procedure: Left heart cath;  Surgeon: Tomy Patterson MD;  Location: [facility] CATH LAB/EP;  Service: Cardiology;  Laterality: N/A;    MANDIBLE FRACTURE SURGERY         Review of patient's allergies indicates:  No Known Allergies    No current  facility-administered medications on file prior to encounter.      Current Outpatient Medications on File Prior to Encounter   Medication Sig    amlodipine (NORVASC) 5 MG tablet Take 1 tablet (5 mg total) by mouth once daily.    clobetasol 0.05% (TEMOVATE) 0.05 % Oint Apply topically 2 (two) times daily.    HYDROcodone-acetaminophen (NORCO) 5-325 mg per tablet Take 1 tablet by mouth every 4 (four) hours as needed for Pain.    ibuprofen (ADVIL,MOTRIN) 600 MG tablet Take 1 tablet (600 mg total) by mouth every 6 (six) hours as needed for Pain.     Family History     None        Tobacco Use    Smoking status: Current Every Day Smoker     Packs/day: 1.00     Types: Cigarettes    Smokeless tobacco: Never Used   Substance and Sexual Activity    Alcohol use: Yes     Alcohol/week: 6.0 standard drinks     Types: 6 Cans of beer per week     Comment: occ    Drug use: No    Sexual activity: Not on file     Review of Systems   Constitution: Positive for fever. Negative for diaphoresis.   HENT: Positive for congestion and ear pain.    Eyes: Negative.    Cardiovascular: Positive for chest pain. Negative for dyspnea on exertion, irregular heartbeat, leg swelling, near-syncope, orthopnea, palpitations, paroxysmal nocturnal dyspnea and syncope.   Respiratory: Positive for cough and shortness of breath.    Endocrine: Negative.    Hematologic/Lymphatic: Negative.    Skin: Negative.    Musculoskeletal: Negative.    Gastrointestinal: Negative.    Genitourinary: Negative.    Neurological: Negative.    Psychiatric/Behavioral: Positive for substance abuse.   Allergic/Immunologic: Negative.      Objective:     Vital Signs (Most Recent):  Temp: 96.5 °F (35.8 °C) (02/21/20 0746)  Pulse: 77 (02/21/20 0746)  Resp: 18 (02/21/20 0746)  BP: 116/82 (02/21/20 0746)  SpO2: (!) 94 % (02/21/20 0746) Vital Signs (24h Range):  Temp:  [96.5 °F (35.8 °C)-98.2 °F (36.8 °C)] 96.5 °F (35.8 °C)  Pulse:  [40-92] 77  Resp:  [18-45] 18  SpO2:  [88 %-100 %]  94 %  BP: (116-155)/() 116/82     Weight: 64.8 kg (142 lb 13.7 oz)  Body mass index is 18.34 kg/m².    SpO2: (!) 94 %  O2 Device (Oxygen Therapy): room air      Intake/Output Summary (Last 24 hours) at 2/21/2020 0903  Last data filed at 2/20/2020 2023  Gross per 24 hour   Intake --   Output 3375 ml   Net -3375 ml       Lines/Drains/Airways     Peripheral Intravenous Line                 Peripheral IV - Single Lumen 02/20/20 1200 20 G Left Antecubital less than 1 day                Physical Exam   Constitutional: He is oriented to person, place, and time. He appears well-developed and well-nourished. No distress.   HENT:   Head: Atraumatic.   Eyes: Right eye exhibits no discharge. Left eye exhibits no discharge.   Neck: No JVD present.   Cardiovascular: Normal rate and regular rhythm.   Murmur heard.  High-pitched blowing holosystolic murmur is present at the apex.  Pulmonary/Chest: Effort normal. He has decreased breath sounds. He has no rales.   Abdominal: Soft. Bowel sounds are normal.   Musculoskeletal: He exhibits no edema.   Neurological: He is alert and oriented to person, place, and time.   Skin: Skin is warm and dry. He is not diaphoretic.   Psychiatric: He has a normal mood and affect. His behavior is normal. Judgment and thought content normal.       Significant Labs:   BMP:   Recent Labs   Lab 02/20/20  1140 02/21/20  0724   * 86    138   K 4.8 3.8    105   CO2 19* 25   BUN 14 16   CREATININE 1.1 1.2   CALCIUM 8.8 8.3*   MG  --  2.1   , CMP   Recent Labs   Lab 02/20/20  1140 02/21/20  0724    138   K 4.8 3.8    105   CO2 19* 25   * 86   BUN 14 16   CREATININE 1.1 1.2   CALCIUM 8.8 8.3*   PROT 6.9  --    ALBUMIN 3.6  --    BILITOT 0.9  --    ALKPHOS 92  --    AST 47*  --    ALT 28  --    ANIONGAP 11 8   ESTGFRAFRICA >60 >60   EGFRNONAA >60 >60   , CBC   Recent Labs   Lab 02/20/20  1140 02/21/20  0724   WBC 4.75 3.62*   HGB 13.0* 13.7*   HCT 38.4* 40.3     240   , INR No results for input(s): INR, PROTIME in the last 48 hours., Lipid Panel No results for input(s): CHOL, HDL, LDLCALC, TRIG, CHOLHDL in the last 48 hours., Troponin   Recent Labs   Lab 02/20/20  1140   TROPONINI 0.219*    and All pertinent lab results from the last 24 hours have been reviewed.    Significant Imaging: Echocardiogram:   Transthoracic echo (TTE) complete (Cupid Only):   Results for orders placed or performed during the hospital encounter of 02/17/20   Echo Color Flow Doppler? Yes   Result Value Ref Range    BSA 1.87 m2    LA WIDTH 6.29 cm    LVIDD 5.68 3.5 - 6.0 cm    IVS 0.98 0.6 - 1.1 cm    PW 1.07 0.6 - 1.1 cm    Ao root annulus 2.84 cm    LVIDS 4.48 (A) 2.1 - 4.0 cm    FS 21 28 - 44 %    LA volume 150.98 cm3    STJ 2.55 cm    Ascending aorta 2.84 cm    LV mass 232.43 g    LA size 4.28 cm    RVDD 2.78 cm    TAPSE 1.52 cm    Left Ventricle Relative Wall Thickness 0.38 cm    AV mean gradient 5 mmHg    AV valve area 2.29 cm2    AV Velocity Ratio 0.51     AV index (prosthetic) 0.59     E/A ratio 3.16     E wave decelartion time 271.14 msec    Pulm vein S/D ratio 0.92     LVOT diameter 2.22 cm    LVOT area 3.9 cm2    LVOT peak praful 0.70 m/s    LVOT peak VTI 12.41 cm    Ao peak praful 1.38 m/s    Ao VTI 20.94 cm    LVOT stroke volume 48.01 cm3    AV peak gradient 8 mmHg    MV Peak E Praful 1.20 m/s    TR Max Praful 3.23 m/s    MV Peak A Praful 0.38 m/s    PV Peak S Praful 0.46 m/s    PV Peak D Praful 0.50 m/s    LV Systolic Volume 91.36 mL    LV Systolic Volume Index 47.8 mL/m2    LV Diastolic Volume 158.56 mL    LV Diastolic Volume Index 82.93 mL/m2    LA Volume Index 79.0 mL/m2    LV Mass Index 122 g/m2    RA Major Axis 6.23 cm    Left Atrium Minor Axis 6.39 cm    Left Atrium Major Axis 6.82 cm    Triscuspid Valve Regurgitation Peak Gradient 42 mmHg    RA Width 5.42 cm    Right Atrial Pressure (from IVC) 8 mmHg    TV rest pulmonary artery pressure 50 mmHg    Narrative    · Low normal left ventricular systolic  function. The estimated ejection   fraction is 45-50%.  · Mild left ventricular enlargement.  · Eccentric left ventricular hypertrophy.  · Grade III (severe) left ventricular diastolic dysfunction consistent   with restrictive physiology.  · No wall motion abnormalities.  · Normal right ventricular systolic function.  · Pulmonary hypertension present.  · The estimated PA systolic pressure is 50 mmHg.  · Intermediate central venous pressure (8 mmHg).  · Severe left atrial enlargement.  · Moderate right atrial enlargement.  · Moderate mitral sclerosis: myxomatous  · Severe mitral regurgitation. 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 Would consider JOSE A and further valve disease workup        Assessment and Plan:     Cocaine abuse  Cessation encouraged     Essential hypertension  SBP at goal  Continue Norvasc     Severe mitral regurgitation  Severe MR per TTE and [facility] with severe LAE  Refer to valve clinic as outpatient     Acute pulmonary edema  CXR cw bilateral PNA  BNP elevated on admission   Patient given Lasix 60 mg IV in the ED and is net -3.3L; now prescribed Lasix 20 mg BID   LVEF 45-50% per recent TTE  Patient does not appear overloaded on exam, would not be opposed to discontinuation of Lasix     NSTEMI (non-ST elevated myocardial infarction)  Type II in setting on PNA  [facility] with normal coronary arteries         VTE Risk Mitigation (From admission, onward)         Ordered     enoxaparin injection 40 mg  Daily      02/20/20 1934     Place sequential compression device  Until discontinued      02/20/20 1934     Place ALYSSA hose  Until discontinued      02/20/20 1934     IP VTE HIGH RISK PATIENT  Once      02/20/20 1934                Thank you for your consult. I will sign off. Please contact us if you have any additional questions.    Kiet Shine NP  Cardiology   [facility]     Multiple subsegmental pulmonary emboli without acute cor pulmonale

## 2022-12-21 NOTE — HOSPITAL COURSE
"02/17/2020 Post Procedure Note: [facility]     The pt was brought to the cath lab and under sterile technique, RCFA access was obtained without difficulty. Images were obtained in multiple views and normal coronaries noted with severe 4+ MR with severely enlarged LA and EF ~45%. Please see full report for details. The pt tolerated the procedure well without complications. Attempt at Mynx closure unsuccessful; manual pressure held with successful hemostasis.      Vitals          Vitals:     02/17/20 0825 02/17/20 1147 02/17/20 1250 02/17/20 1300   BP:   (!) 138/101 (!) 150/103 (!) 143/102   BP Location:   Left arm Left arm Left leg   Patient Position:   Lying Lying Lying   Pulse: 80 77   77   Resp:   20   13   Temp:   97.8 °F (36.6 °C)       TempSrc:   Oral       SpO2:   95%   100%   Weight: 67.1 kg (148 lb)         Height: 6' 2" (1.88 m)                     Gen: NAD  Ext: 2+ fem pulse, no evidence of hematoma  Estimated blood loss: < 50 cc     Plan:  -Post cath care per protocol  -OMT for CHF  -Consider surgical eval of MVr/MVR as outpatient      "
No

## (undated) DEVICE — SYR MED RAD 150ML

## (undated) DEVICE — KIT LEFT HEART MANIFOLD CUSTOM

## (undated) DEVICE — CATH IMPULSE 5F 100CM FR4

## (undated) DEVICE — CATH IMPULSE FL4 5FR 100CM

## (undated) DEVICE — PAD RADI FEMORAL

## (undated) DEVICE — Device

## (undated) DEVICE — CONTRAST VISIPAQUE 150ML

## (undated) DEVICE — TUBING HPCIL ROT M/F ADPT 48IN

## (undated) DEVICE — SET MICRO PUNCT 4FR/MPIS-401

## (undated) DEVICE — GUIDEWIRE EMERALD 150CM PTFE

## (undated) DEVICE — CATH IMPULSE 5FR PIGTAIL 125CM

## (undated) DEVICE — PAD HEARTSTART DEFIB ADULT

## (undated) DEVICE — SHEATH INTRODUCER 5FR 10CM

## (undated) DEVICE — DEVICE CLOSURE MYNX GRIP 5FR